# Patient Record
Sex: FEMALE | Race: WHITE | NOT HISPANIC OR LATINO | Employment: UNEMPLOYED | URBAN - METROPOLITAN AREA
[De-identification: names, ages, dates, MRNs, and addresses within clinical notes are randomized per-mention and may not be internally consistent; named-entity substitution may affect disease eponyms.]

---

## 2023-02-13 ENCOUNTER — TELEPHONE (OUTPATIENT)
Dept: OBGYN CLINIC | Facility: HOSPITAL | Age: 33
End: 2023-02-13

## 2023-02-13 ENCOUNTER — OFFICE VISIT (OUTPATIENT)
Dept: URGENT CARE | Facility: MEDICAL CENTER | Age: 33
End: 2023-02-13

## 2023-02-13 ENCOUNTER — APPOINTMENT (OUTPATIENT)
Dept: RADIOLOGY | Facility: MEDICAL CENTER | Age: 33
End: 2023-02-13

## 2023-02-13 VITALS
RESPIRATION RATE: 18 BRPM | BODY MASS INDEX: 34.55 KG/M2 | DIASTOLIC BLOOD PRESSURE: 70 MMHG | HEIGHT: 60 IN | SYSTOLIC BLOOD PRESSURE: 110 MMHG | WEIGHT: 176 LBS | TEMPERATURE: 98.6 F | OXYGEN SATURATION: 97 % | HEART RATE: 82 BPM

## 2023-02-13 DIAGNOSIS — S69.92XA HAND INJURY, LEFT, INITIAL ENCOUNTER: ICD-10-CM

## 2023-02-13 DIAGNOSIS — S62.357A CLOSED NONDISPLACED FRACTURE OF SHAFT OF FIFTH METACARPAL BONE OF LEFT HAND, INITIAL ENCOUNTER: Primary | ICD-10-CM

## 2023-02-13 NOTE — PROGRESS NOTES
[facility]        NAME: Елена Cunningham is a 27 y o  female  : 1990    MRN: [id number]  DATE: 2023  TIME: 6:03 PM    Assessment and Plan   Closed nondisplaced fracture of shaft of fifth metacarpal bone of left hand, initial encounter [[id number] [id number]]  1  Closed nondisplaced fracture of shaft of fifth metacarpal bone of left hand, initial encounter        2  Hand injury, left, initial encounter  XR hand 3+ vw left            Patient Instructions       Follow up with PCP in 3-5 days  Proceed to  ER if symptoms worsen  Chief Complaint     Chief Complaint   Patient presents with   • Hand Pain     Left hand          History of Present Illness       Patient was helping morbid obese significant other up from seated position and he went backwards and she went backwards and believes she struck her left hand on a shopping cart type object  She believes she may have wedged it in between the object and the floor  No other injury when she fell  Injury occurred last night around 8pm  Applied ice and took 600mg of ibuprofen  She reports some minimal relief with this treatment  Review of Systems   Review of Systems   Constitutional: Negative for chills and fever  HENT: Negative for ear pain and sore throat  Eyes: Negative for pain and visual disturbance  Respiratory: Negative for cough and shortness of breath  Cardiovascular: Negative for chest pain and palpitations  Gastrointestinal: Negative for abdominal pain and vomiting  Genitourinary: Negative for dysuria and hematuria  Musculoskeletal: Negative for arthralgias and back pain  Skin: Negative for color change and rash  Neurological: Negative for seizures and syncope  All other systems reviewed and are negative          Current Medications       Current Outpatient Medications:   •  QUEtiapine Fumarate (SEROQUEL PO), Take by mouth, Disp: , Rfl:     Current Allergies     Allergies as of 2023   • (No Known Allergies) The following portions of the patient's history were reviewed and updated as appropriate: allergies, current medications, past family history, past medical history, past social history, past surgical history and problem list      Past Medical History:   Diagnosis Date   • Allergic    • Anxiety        Past Surgical History:   Procedure Laterality Date   •  SECTION N/A        History reviewed  No pertinent family history  Medications have been verified  Objective   /70   Pulse 82   Temp 98 6 °F (37 °C)   Resp 18   Ht 5' (1 524 m)   Wt 79 8 kg (176 lb)   SpO2 97%   BMI 34 37 kg/m²        Physical Exam     Physical Exam  Vitals and nursing note reviewed  Constitutional:       General: She is not in acute distress  Appearance: Normal appearance  She is normal weight  She is not ill-appearing  HENT:      Head: Normocephalic and atraumatic  Right Ear: Tympanic membrane, ear canal and external ear normal       Left Ear: Tympanic membrane, ear canal and external ear normal       Nose: Nose normal       Mouth/Throat:      Mouth: Mucous membranes are moist       Pharynx: Oropharynx is clear  Eyes:      Extraocular Movements: Extraocular movements intact  Conjunctiva/sclera: Conjunctivae normal       Pupils: Pupils are equal, round, and reactive to light  Cardiovascular:      Rate and Rhythm: Normal rate and regular rhythm  Pulses: Normal pulses  Heart sounds: Normal heart sounds  Pulmonary:      Effort: Pulmonary effort is normal       Breath sounds: Normal breath sounds  Abdominal:      General: Abdomen is flat  Bowel sounds are normal       Palpations: Abdomen is soft  Musculoskeletal:      Left hand: Swelling, tenderness and bony tenderness present  Decreased strength of thumb/finger opposition  Normal strength of finger abduction and wrist extension  Normal sensation  Cervical back: Normal range of motion and neck supple        Comments: ROM limited d/t pain  Able to perform T/F opposition, discomfort increased with 5th digit  Provider Radiology Interpretation (preliminary)   Final results will be as per official Radiology Report when available:   5th metacarpal mis shaft fracture, non -displaced  Patient placed in a commercial ulnar gutter splint with follow up with ortho  Skin:     General: Skin is warm  Capillary Refill: Capillary refill takes less than 2 seconds  Neurological:      General: No focal deficit present  Mental Status: She is alert and oriented to person, place, and time     Psychiatric:         Mood and Affect: Mood normal          Behavior: Behavior normal

## 2023-02-13 NOTE — TELEPHONE ENCOUNTER
Patient is being referred to a orthopedics  Please schedule accordingly      [address]   [phone]

## 2023-02-13 NOTE — PATIENT INSTRUCTIONS
You make take Over the Counter Tylenol (Acetaminophen) and/or Motrin (Ibuprofen) as needed, as directed on packaging  Follow up with Ortho

## 2023-02-16 ENCOUNTER — OFFICE VISIT (OUTPATIENT)
Dept: OBGYN CLINIC | Facility: CLINIC | Age: 33
End: 2023-02-16

## 2023-02-16 VITALS
BODY MASS INDEX: 34.55 KG/M2 | DIASTOLIC BLOOD PRESSURE: 75 MMHG | SYSTOLIC BLOOD PRESSURE: 109 MMHG | WEIGHT: 176 LBS | HEIGHT: 60 IN | HEART RATE: 72 BPM

## 2023-02-16 DIAGNOSIS — S62.327A CLOSED DISPLACED FRACTURE OF SHAFT OF FIFTH METACARPAL BONE OF LEFT HAND, INITIAL ENCOUNTER: Primary | ICD-10-CM

## 2023-02-16 DIAGNOSIS — S62.357A CLOSED NONDISPLACED FRACTURE OF SHAFT OF FIFTH METACARPAL BONE OF LEFT HAND, INITIAL ENCOUNTER: ICD-10-CM

## 2023-02-16 NOTE — LETTER
February 16, 2023     Cristal Castañeda, [address] [address]    Patient: Marcelo Eng   YOB: 1990   Date of Visit: 2/16/2023       Dear Dr Amber Gomez: Thank you for referring Marcelo Eng to me for evaluation  Below are my notes for this consultation  If you have questions, please do not hesitate to call me  I look forward to following your patient along with you  Sincerely,        Jessy Doe MD        CC: No Recipients  Jessy Doe MD  2/16/2023  3:19 PM  Signed  ORTHOPAEDIC HAND, WRIST, AND ELBOW OFFICE  VISIT       ASSESSMENT/PLAN:      27 y o female who presents today with Left 5th metacarpal fracture    X-rays independently reviewed:  5th metacarpal shaft fracture with some shortening and mild translation  Physical exam performed  We discussd that fracture is in a tolerant position and can heal well without surgery  We discussed surgery can be performed and this will get her moving slightly faster  We discussed perioperative and postoperative care  Pt would like to be immobilized  She would like to be place in a easily removable splint if she could  We will fabricate a volar short arm for her  She can remove to bathe  Pt can alternate between the two splints  Encouraged finger movement    The patient verbalized understanding of exam findings and treatment plan  We engaged in the shared decision-making process and treatment options were discussed at length with the patient  Surgical and conservative management discussed today along with risks and benefits  Diagnoses and all orders for this visit:    Closed displaced fracture of shaft of fifth metacarpal bone of left hand, initial encounter    Other orders  -     Splint application        Follow Up:  Return in about 3 weeks (around 3/9/2023)  To Do Next Visit:  Re-evaluation of current issue      General Discussions:  Fracture - Nonoperative Care:  The physiology of a fractured bone was discussed with the patient today  With non-displaced or minimally displaced fractures, conservative treatment such as casting or splinting often results in a functional recovery  Typically, these fractures are immobilized in either a cast or splint depending on the pattern  Radiographs are typically taken at intervals throughout the fracture healing to ensure that reduction or alignment is not lost   If the fracture loses its alignment, surgical intervention may be required to stabilize it  Medical conditions such as diabetes, osteoporosis, vitamin D deficiency, and a history of or exposure to smoking may delay or prevent fracture healing  Options between cast/splint immobilization and surgical treatment were offered and the risks and benefits of both were discussed  ____________________________________________________________________________________________________________________________________________      CHIEF COMPLAINT:  Chief Complaint   Patient presents with   • Left Hand - Pain       SUBJECTIVE:  Margot Bae is a 27y o  year old  female who presents today for evaluation of Left hand pain  DOI 2023  Pt states that she was attempting to help her  up from a seated position when she lost  and fell backwards  Pt's significant other is morbidly obese  Pt states her hand hit a wheel of a personal shopping cart  They observed her over nigth and they went to the ED the next day  She was X-rayed and placed in a velcro volar splint   Has mild pain in splint  Presents with velcro ulnar gutter splint    I have personally reviewed all the relevant PMH, PSH, SH, FH, Medications and allergies      PAST MEDICAL HISTORY:  Past Medical History:   Diagnosis Date   • Allergic    • Anxiety        PAST SURGICAL HISTORY:  Past Surgical History:   Procedure Laterality Date   •  SECTION N/A        FAMILY HISTORY:  History reviewed  No pertinent family history      SOCIAL HISTORY:  Social History Tobacco Use   • Smoking status: Never   • Smokeless tobacco: Never   Vaping Use   • Vaping Use: Never used   Substance Use Topics   • Alcohol use: Not Currently   • Drug use: Never       MEDICATIONS:    Current Outpatient Medications:   •  QUEtiapine Fumarate (SEROQUEL PO), Take by mouth, Disp: , Rfl:     ALLERGIES:  No Known Allergies        REVIEW OF SYSTEMS:  Review of Systems   Constitutional: Negative for chills and fever  HENT: Negative for ear pain and sore throat  Eyes: Negative for pain and visual disturbance  Respiratory: Negative for cough and shortness of breath  Cardiovascular: Negative for chest pain and palpitations  Gastrointestinal: Negative for abdominal pain and vomiting  Genitourinary: Negative for dysuria and hematuria  Musculoskeletal: Negative for arthralgias and back pain  Skin: Negative for color change and rash  Neurological: Negative for seizures and syncope  All other systems reviewed and are negative  VITALS:  Vitals:    02/16/23 1419   BP: 109/75   Pulse: 72       LABS:  HgA1c: No results found for: HGBA1C  BMP: No results found for: GLUCOSE, CALCIUM, NA, K, CO2, CL, BUN, CREATININE    _____________________________________________________  PHYSICAL EXAMINATION:  General: well developed and well nourished, alert, oriented times 3 and appears comfortable  Psychiatric: Normal  HEENT: Normocephalic, Atraumatic Trachea Midline, No torticollis  Pulmonary: No audible wheezing or respiratory distress   Abdomen/GI: Non tender, non distended   Cardiovascular: No pitting edema, 2+ radial pulse   Skin: No masses, erythema, lacerations, fluctation, ulcerations  Neurovascular: Sensation Intact to the Median, Ulnar, Radial Nerve, Motor Intact to the Median, Ulnar, Radial Nerve and Pulses Intact  Musculoskeletal: Normal, except as noted in detailed exam and in HPI        MUSCULOSKELETAL EXAMINATION:  SILT  Able to make a full fist complex, no scissoring, some mild discomfort on fist complex, ecchymoses in palm and dorsal hand    ___________________________________________________  STUDIES REVIEWED:  I have personally reviewed AP lateral and oblique radiographs of Left hand which demonstrate     5th metacarpal fracture          PROCEDURES PERFORMED:  Splint application    Date/Time: 2/16/2023 2:51 PM  Performed by: Key Cramer  Authorized by: Jessy Doe MD   Universal Protocol:  Consent: Verbal consent obtained  Consent given by: patient  Timeout called at: 2/16/2023 2:51 PM   Patient understanding: patient states understanding of the procedure being performed      Procedure details:     Laterality:  Left    Location:  Hand    Hand:  L hand    Strapping: no      Splint type:  Volar short arm    Supplies:  Ortho-Glass and elastic bandage  Post-procedure details:     Pain:  Unchanged    Sensation:  Normal    Patient tolerance of procedure:   Tolerated well, no immediate complications          _____________________________________________________      Luigi Moreno    I,:  Key Cramer am acting as a scribe while in the presence of the attending physician :       I,:  Jessy Doe MD personally performed the services described in this documentation    as scribed in my presence :

## 2023-02-16 NOTE — PROGRESS NOTES
ORTHOPAEDIC HAND, WRIST, AND ELBOW OFFICE  VISIT       ASSESSMENT/PLAN:      27 y o female who presents today with Left 5th metacarpal fracture    X-rays independently reviewed:  5th metacarpal shaft fracture with some shortening and mild translation  Physical exam performed  We discussd that fracture is in a tolerant position and can heal well without surgery  We discussed surgery can be performed and this will get her moving slightly faster  We discussed perioperative and postoperative care  Pt would like to be immobilized  She would like to be place in a easily removable splint if she could  We will fabricate a volar short arm for her  She can remove to bathe  Pt can alternate between the two splints  Encouraged finger movement    The patient verbalized understanding of exam findings and treatment plan  We engaged in the shared decision-making process and treatment options were discussed at length with the patient  Surgical and conservative management discussed today along with risks and benefits  Diagnoses and all orders for this visit:    Closed displaced fracture of shaft of fifth metacarpal bone of left hand, initial encounter    Other orders  -     Splint application        Follow Up:  Return in about 3 weeks (around 3/9/2023)  To Do Next Visit:  Re-evaluation of current issue      General Discussions:  Fracture - Nonoperative Care: The physiology of a fractured bone was discussed with the patient today  With non-displaced or minimally displaced fractures, conservative treatment such as casting or splinting often results in a functional recovery  Typically, these fractures are immobilized in either a cast or splint depending on the pattern  Radiographs are typically taken at intervals throughout the fracture healing to ensure that reduction or alignment is not lost   If the fracture loses its alignment, surgical intervention may be required to stabilize it    Medical conditions such as diabetes, osteoporosis, vitamin D deficiency, and a history of or exposure to smoking may delay or prevent fracture healing  Options between cast/splint immobilization and surgical treatment were offered and the risks and benefits of both were discussed  ____________________________________________________________________________________________________________________________________________      CHIEF COMPLAINT:  Chief Complaint   Patient presents with   • Left Hand - Pain       SUBJECTIVE:  Gus Rodas is a 27y o  year old  female who presents today for evaluation of Left hand pain  DOI 2023  Pt states that she was attempting to help her  up from a seated position when she lost  and fell backwards  Pt's significant other is morbidly obese  Pt states her hand hit a wheel of a personal shopping cart  They observed her over nigth and they went to the ED the next day  She was X-rayed and placed in a velcro volar splint   Has mild pain in splint  Presents with velcro ulnar gutter splint    I have personally reviewed all the relevant PMH, PSH, SH, FH, Medications and allergies      PAST MEDICAL HISTORY:  Past Medical History:   Diagnosis Date   • Allergic    • Anxiety        PAST SURGICAL HISTORY:  Past Surgical History:   Procedure Laterality Date   •  SECTION N/A        FAMILY HISTORY:  History reviewed  No pertinent family history  SOCIAL HISTORY:  Social History     Tobacco Use   • Smoking status: Never   • Smokeless tobacco: Never   Vaping Use   • Vaping Use: Never used   Substance Use Topics   • Alcohol use: Not Currently   • Drug use: Never       MEDICATIONS:    Current Outpatient Medications:   •  QUEtiapine Fumarate (SEROQUEL PO), Take by mouth, Disp: , Rfl:     ALLERGIES:  No Known Allergies        REVIEW OF SYSTEMS:  Review of Systems   Constitutional: Negative for chills and fever  HENT: Negative for ear pain and sore throat      Eyes: Negative for pain and visual disturbance  Respiratory: Negative for cough and shortness of breath  Cardiovascular: Negative for chest pain and palpitations  Gastrointestinal: Negative for abdominal pain and vomiting  Genitourinary: Negative for dysuria and hematuria  Musculoskeletal: Negative for arthralgias and back pain  Skin: Negative for color change and rash  Neurological: Negative for seizures and syncope  All other systems reviewed and are negative  VITALS:  Vitals:    02/16/23 1419   BP: 109/75   Pulse: 72       LABS:  HgA1c: No results found for: HGBA1C  BMP: No results found for: GLUCOSE, CALCIUM, NA, K, CO2, CL, BUN, CREATININE    _____________________________________________________  PHYSICAL EXAMINATION:  General: well developed and well nourished, alert, oriented times 3 and appears comfortable  Psychiatric: Normal  HEENT: Normocephalic, Atraumatic Trachea Midline, No torticollis  Pulmonary: No audible wheezing or respiratory distress   Abdomen/GI: Non tender, non distended   Cardiovascular: No pitting edema, 2+ radial pulse   Skin: No masses, erythema, lacerations, fluctation, ulcerations  Neurovascular: Sensation Intact to the Median, Ulnar, Radial Nerve, Motor Intact to the Median, Ulnar, Radial Nerve and Pulses Intact  Musculoskeletal: Normal, except as noted in detailed exam and in HPI  MUSCULOSKELETAL EXAMINATION:  SILT  Able to make a full fist complex, no scissoring, some mild discomfort on fist complex, ecchymoses in palm and dorsal hand    ___________________________________________________  STUDIES REVIEWED:  I have personally reviewed AP lateral and oblique radiographs of Left hand which demonstrate     5th metacarpal fracture          PROCEDURES PERFORMED:  Splint application    Date/Time: 2/16/2023 2:51 PM  Performed by: Maru Teran  Authorized by: Lynne Multani MD   Universal Protocol:  Consent: Verbal consent obtained    Consent given by: patient  Timeout called at: 2/16/2023 2:51 PM   Patient understanding: patient states understanding of the procedure being performed      Procedure details:     Laterality:  Left    Location:  Hand    Hand:  L hand    Strapping: no      Splint type:  Volar short arm    Supplies:  Ortho-Glass and elastic bandage  Post-procedure details:     Pain:  Unchanged    Sensation:  Normal    Patient tolerance of procedure:   Tolerated well, no immediate complications          _____________________________________________________      Leia Mccracken    I,:  Jackie Blake am acting as a scribe while in the presence of the attending physician :       I,:  Katrina Moritz, MD personally performed the services described in this documentation    as scribed in my presence :

## 2023-03-09 ENCOUNTER — TELEPHONE (OUTPATIENT)
Dept: OBGYN CLINIC | Facility: HOSPITAL | Age: 33
End: 2023-03-09

## 2023-03-09 ENCOUNTER — OFFICE VISIT (OUTPATIENT)
Dept: OBGYN CLINIC | Facility: CLINIC | Age: 33
End: 2023-03-09

## 2023-03-09 VITALS
HEART RATE: 82 BPM | DIASTOLIC BLOOD PRESSURE: 78 MMHG | BODY MASS INDEX: 34.55 KG/M2 | HEIGHT: 60 IN | SYSTOLIC BLOOD PRESSURE: 118 MMHG | WEIGHT: 176 LBS

## 2023-03-09 DIAGNOSIS — S62.327A CLOSED DISPLACED FRACTURE OF SHAFT OF FIFTH METACARPAL BONE OF LEFT HAND, INITIAL ENCOUNTER: Primary | ICD-10-CM

## 2023-03-09 NOTE — TELEPHONE ENCOUNTER
Caller: Patient     Doctor: Enrrique Huang    Reason for call: Calling from outside office requesting they put the info for the same  that dropped them off into the system to pick them up  Spoke to office and they are unable to do it this way since it has to be requested through [address] [place] [address] services and that  was not available       They advised patient can come back in to set up new lyft if needed     Call back#: n/a

## 2023-03-09 NOTE — PROGRESS NOTES
ASSESSMENT/PLAN:      27 y o female with left 5th metacarpal fracture, DOI 2/13/23  Discussed with patient to continue splinting at night, to leave off during the day  Patient to begin gentle ROM exercises, making sure she continues moving fingers so she doesn't get too stiff  New volar short arm splint was fabricated and placed on patient today  Patient will return in 3 weeks for re-evaluation and repeat xrays  NSAIDs if discomfort arises  The patient verbalized understanding of exam findings and treatment plan  We engaged in the shared decision-making process and treatment options were discussed at length with the patient  Surgical and conservative management discussed today along with risks and benefits  Diagnoses and all orders for this visit:    Closed displaced fracture of shaft of fifth metacarpal bone of left hand, initial encounter    Other orders  -     Splint application      Follow Up:  Return in about 3 weeks (around 3/30/2023) for Recheck and xrays  To Do Next Visit:  Re-evaluation of current issue and X-rays of the  left  hand    ____________________________________________________________________________________________________________________________________________      CHIEF COMPLAINT:  Chief Complaint   Patient presents with   • Left Hand - Fracture, Follow-up       SUBJECTIVE:  Arthur Ruiz is a 27y o  year old female who presents for follow up of left 5th metacarpal fracture, DOI 2/13/23  Patient has been complaint with wearing volar short arm splint and only removing to bathe  She reports no pain  She states she had 1 episode of numbness and tingling at night but believes it was due to position of her arm  I have personally reviewed all the relevant PMH, PSH, SH, FH, Medications and allergies       PAST MEDICAL HISTORY:  Past Medical History:   Diagnosis Date   • Allergic    • Anxiety        PAST SURGICAL HISTORY:  Past Surgical History:   Procedure Laterality Date   •  SECTION N/A        FAMILY HISTORY:  History reviewed  No pertinent family history  SOCIAL HISTORY:  Social History     Tobacco Use   • Smoking status: Never   • Smokeless tobacco: Never   Vaping Use   • Vaping Use: Never used   Substance Use Topics   • Alcohol use: Not Currently   • Drug use: Never       MEDICATIONS:    Current Outpatient Medications:   •  QUEtiapine Fumarate (SEROQUEL PO), Take by mouth, Disp: , Rfl:     ALLERGIES:  No Known Allergies    REVIEW OF SYSTEMS:  Review of Systems   Constitutional: Negative for chills and fever  HENT: Negative for ear pain and sore throat  Eyes: Negative for pain and visual disturbance  Respiratory: Negative for cough and shortness of breath  Cardiovascular: Negative for chest pain and palpitations  Gastrointestinal: Negative for abdominal pain and vomiting  Genitourinary: Negative for dysuria and hematuria  Musculoskeletal: Negative for arthralgias and back pain  Skin: Negative for color change and rash  Neurological: Negative for seizures and syncope  All other systems reviewed and are negative        VITALS:  Vitals:    23 1447   BP: 118/78   Pulse: 82       LABS:  HgA1c: No results found for: HGBA1C  BMP: No results found for: GLUCOSE, CALCIUM, NA, K, CO2, CL, BUN, CREATININE    _____________________________________________________  PHYSICAL EXAMINATION:  General: well developed and well nourished, alert, oriented times 3 and appears comfortable  Psychiatric: Normal  HEENT: Normocephalic, Atraumatic Trachea Midline, No torticollis  Pulmonary: No audible wheezing or respiratory distress   Cardiovascular: No pitting edema, 2+ radial pulse   Abdominal/GI: abdomen non tender, non distended   Skin: No masses, erythema, lacerations, fluctation, ulcerations  Neurovascular: Sensation Intact to the Median, Ulnar, Radial Nerve, Motor Intact to the Median, Ulnar, Radial Nerve and Pulses Intact  Musculoskeletal: Normal, except as noted in detailed exam and in HPI  MUSCULOSKELETAL EXAMINATION:  Full composite fist  Full extension  SILT  No erythema, ecchymosis, or edema  No tenderness of fracture site      ___________________________________________________  STUDIES REVIEWED:  No new imaging to review      PROCEDURES PERFORMED:  Splint application    Date/Time: 3/9/2023 3:15 PM  Performed by: Bg Greene  Authorized by: Ashley Vo MD   Universal Protocol:  Consent: Verbal consent obtained  Written consent not obtained  Risks and benefits: risks, benefits and alternatives were discussed  Consent given by: patient  Time out: Immediately prior to procedure a "time out" was called to verify the correct patient, procedure, equipment, support staff and site/side marked as required  Timeout called at: 3/9/2023 3:15 PM   Patient understanding: patient states understanding of the procedure being performed  Patient identity confirmed: verbally with patient      Pre-procedure details:     Sensation:  Normal  Procedure details:     Laterality:  Left    Location:  Hand    Hand:  L hand    Splint type:  Volar short arm    Supplies:  Elastic bandage and Ortho-Glass  Post-procedure details:     Pain:  Improved    Sensation:  Normal    Patient tolerance of procedure:   Tolerated well, no immediate complications           _____________________________________________________      [facility]    I,:  Bg Greene am acting as a scribe while in the presence of the attending physician :       I,:  Ashley Vo MD personally performed the services described in this documentation    as scribed in my presence :

## 2023-03-09 NOTE — TELEPHONE ENCOUNTER
Caller: Patient significant other     Doctor: Georgi Nagy     Reason for call: He called today asking for a ride for today's appointment   Email sent to practice admin     Call back#: [phone]

## 2023-04-11 PROBLEM — S62.327A CLOSED DISPLACED FRACTURE OF SHAFT OF FIFTH METACARPAL BONE OF LEFT HAND: Status: ACTIVE | Noted: 2023-04-11

## 2023-04-25 ENCOUNTER — OFFICE VISIT (OUTPATIENT)
Dept: FAMILY MEDICINE CLINIC | Facility: CLINIC | Age: 33
End: 2023-04-25

## 2023-04-25 VITALS
HEART RATE: 83 BPM | TEMPERATURE: 97.4 F | BODY MASS INDEX: 34.75 KG/M2 | HEIGHT: 60 IN | WEIGHT: 177 LBS | RESPIRATION RATE: 18 BRPM | SYSTOLIC BLOOD PRESSURE: 110 MMHG | DIASTOLIC BLOOD PRESSURE: 76 MMHG | OXYGEN SATURATION: 97 %

## 2023-04-25 DIAGNOSIS — Z13.21 ENCOUNTER FOR VITAMIN DEFICIENCY SCREENING: ICD-10-CM

## 2023-04-25 DIAGNOSIS — Z76.89 ENCOUNTER TO ESTABLISH CARE: Primary | ICD-10-CM

## 2023-04-25 DIAGNOSIS — F41.9 ANXIETY: ICD-10-CM

## 2023-04-25 DIAGNOSIS — Z12.4 CERVICAL CANCER SCREENING: ICD-10-CM

## 2023-04-25 RX ORDER — MONTELUKAST SODIUM 10 MG/1
10 TABLET ORAL
COMMUNITY

## 2023-04-25 NOTE — PROGRESS NOTES
Assessment/Plan:       Problem List Items Addressed This Visit    None  Visit Diagnoses     Encounter to establish care    -  Primary    Relevant Orders    : HIV 1/2 AB/AG w Reflex [facility] for 2 yr old and above    Hepatitis C antibody    CBC and differential    Comprehensive metabolic panel    HEMOGLOBIN A1C W/ EAG ESTIMATION    TSH, 3rd generation with Free T4 reflex    Lipid Panel with Direct LDL reflex    BMI 34 0-34 9,adult        Cervical cancer screening        Relevant Orders    Ambulatory Referral to Gynecology    Encounter for vitamin deficiency screening        Relevant Orders    Vitamin D 25 hydroxy    Anxiety        Relevant Orders    Ambulatory Referral to Psychiatry    Ambulatory Referral to Behavioral Health          Hemodynamically stable, clinically well-appearing and no acute distress  Patient will be given some blood work today  Additionally patient will be given a  prescription for psychiatry and behavioral health secondary to symptoms of anxiety and stress levels     Continue to take Seroquel and follow-up with me about dosage  Has not been compliant with her Seroquel  Patient is to follow-up with next 4 to 6 weeks  Subjective:      Patient ID: Steve Quiroz is a 27 y o  female past medical history of asthma presents to the clinic to establish care  Patient has moved from [place]  Medication patient takes includes Seroquel but she is unsure of the medication dose, Singulair and Flonase as needed  Patient denies chest pain, shortness of breath, fevers, chills, night sweats  Patient denies any recreational drugs, alcohol use or tobacco use  She is not up-to-date with cervical cancer screening  Patient is unsure of FDLMP  Patient says that elevated level of stress and anxiety secondary to issues if not finding a job  Patient denies homicidal or suicidal ideations  Patient takes Seroquel but not completely compliant with medication      HPI    The following portions of the patient's history were reviewed and updated as appropriate: allergies, current medications, past family history, past medical history, past social history, past surgical history and problem list     Review of Systems   Constitutional: Negative for chills and fever  HENT: Negative for ear pain and sore throat  Eyes: Negative for pain and visual disturbance  Respiratory: Negative for cough and shortness of breath  Cardiovascular: Negative for chest pain and palpitations  Gastrointestinal: Negative for abdominal pain and vomiting  Genitourinary: Negative for dysuria and hematuria  Musculoskeletal: Negative for arthralgias and back pain  Skin: Negative for color change and rash  Neurological: Negative for seizures and syncope  Psychiatric/Behavioral: The patient is nervous/anxious  All other systems reviewed and are negative  Objective:    /76   Pulse 83   Temp (!) 97 4 °F (36 3 °C)   Resp 18   Ht 5' (1 524 m)   Wt 80 3 kg (177 lb)   SpO2 97%   BMI 34 57 kg/m²        Physical Exam  Constitutional:       Appearance: Normal appearance  She is normal weight  HENT:      Head: Normocephalic and atraumatic  Right Ear: Tympanic membrane normal       Left Ear: Tympanic membrane normal    Cardiovascular:      Rate and Rhythm: Normal rate and regular rhythm  Pulses: Normal pulses  Heart sounds: Normal heart sounds  Pulmonary:      Effort: Pulmonary effort is normal       Breath sounds: Normal breath sounds  Abdominal:      General: Abdomen is flat  Bowel sounds are normal  There is no distension  Palpations: Abdomen is soft  There is no mass  Tenderness: There is no abdominal tenderness  Hernia: No hernia is present  Musculoskeletal:         General: No swelling or tenderness  Normal range of motion  Cervical back: Normal range of motion  Skin:     General: Skin is warm  Capillary Refill: Capillary refill takes less than 2 seconds  Neurological:      General: No focal deficit present  Mental Status: She is alert and oriented to person, place, and time  Cranial Nerves: No cranial nerve deficit  Sensory: No sensory deficit  Motor: No weakness        Coordination: Coordination normal    Psychiatric:         Mood and Affect: Mood normal          Behavior: Behavior normal

## 2023-04-27 ENCOUNTER — TELEPHONE (OUTPATIENT)
Dept: PSYCHIATRY | Facility: CLINIC | Age: 33
End: 2023-04-27

## 2023-04-27 NOTE — TELEPHONE ENCOUNTER
Called pt regarding referral a gentleman answered advised to have pt call us at there convenience as patient was asleep at the moment

## 2023-05-02 NOTE — PROGRESS NOTES
[facility]  [address] [facility]    ASSESSMENT/PLAN: Jose A Barraza is a 27 y o  No obstetric history on file  who presents for annual gynecologic exam     Encounter for routine gynecologic examination  - Routine well woman exam completed today  - Cervical Cancer Screening: Current ASCCP Guidelines reviewed  Last Pap: unknwon  Next Pap Due: today  - HPV Vaccination status: unknown  - STI screening offered including HIV testing: not indicated based on hx or requested at time of visit  - Contraceptive counseling discussed  Current contraception: 2/2018, IUD- ? Type, threads not seen at time of visit  - to use condoms until placement of IUD verified    - RTO 1 year and follow-up IUD     Additional problems addressed during this visit:  1  Encounter for gynecological examination without abnormal finding  -     Liquid-based pap, screening  -     HPV High Risk    2  Cervical cancer screening  -     Ambulatory Referral to Gynecology  -     Liquid-based pap, screening  -     HPV High Risk    3  Intrauterine contraceptive device threads lost, initial encounter  -     US pelvis complete non OB; Future    - will follow-up ultrasound when available  CC:  Annual Gynecologic Examination    HPI: Jose A Barraza is a 27 y o  No obstetric history on file  who presents for annual gynecologic examination  Chang Raid presents for gyn exam today  N/A LMP  No menses since insertion of IUD  Using IUD as birth control method  Does not like how IUD makes her feel  Last pap smear unknown  History of abnormal pap smear- no  Sexually active- yes, with partner since  2016  HPV vaccine - unknown  Does not desire STI testing  Interrupted sleep per day  1-2 servings of calcium rich food per day  Walking daily  Decreasing servings of caffeine per day  Does not perform SBE monthly  Safe at home - yes  Wears seat belts  Concerns :  Unsure of IUD type  Wants to lose 30 lbs   Feels that she would like to try a different birth control pill  Will wait until seen by psychiatry to change birth control         The following portions of the patient's history were reviewed and updated as appropriate: allergies, current medications, past family history, past medical history, obstetric history, gynecologic history, past social history, past surgical history and problem list     Review of Systems   Constitutional: Negative for chills, fatigue and fever  Respiratory: Negative for cough and shortness of breath  Cardiovascular: Negative for chest pain, palpitations and leg swelling  Gastrointestinal: Positive for diarrhea  Negative for constipation  Genitourinary: Negative for difficulty urinating, dysuria, frequency, menstrual problem, pelvic pain, urgency, vaginal bleeding, vaginal discharge and vaginal pain  Neurological: Negative for light-headedness and headaches  Psychiatric/Behavioral: The patient is nervous/anxious  Objective:  /64   Ht 5' (1 524 m)   Wt 80 7 kg (178 lb)   LMP  (LMP Unknown)   BMI 34 76 kg/m²    Physical Exam  Vitals reviewed  Constitutional:       Appearance: Normal appearance  HENT:      Head: Normocephalic  Neck:      Thyroid: No thyroid mass or thyroid tenderness  Cardiovascular:      Rate and Rhythm: Normal rate and regular rhythm  Heart sounds: Normal heart sounds  Pulmonary:      Effort: Pulmonary effort is normal       Breath sounds: Normal breath sounds  Chest:   Breasts:     Right: No mass, nipple discharge, skin change or tenderness  Left: No mass, nipple discharge, skin change or tenderness  Abdominal:      General: There is no distension  Palpations: There is no mass  Tenderness: There is no abdominal tenderness  There is no guarding  Genitourinary:     General: Normal vulva  Exam position: Lithotomy position  Labia:         Right: No tenderness or lesion  Left: No tenderness or lesion         Vagina: No vaginal discharge, tenderness, bleeding or lesions  Cervix: No discharge, lesion, erythema or cervical bleeding  Uterus: Normal  Not enlarged and not tender  Adnexa:         Right: No mass, tenderness or fullness  Left: No mass, tenderness or fullness  Comments: No IUD string visualized  Musculoskeletal:      Cervical back: Normal range of motion  Lymphadenopathy:      Upper Body:      Right upper body: No axillary adenopathy  Left upper body: No axillary adenopathy  Skin:     General: Skin is warm and dry  Neurological:      Mental Status: She is alert     Psychiatric:         Mood and Affect: Mood normal          Behavior: Behavior normal          Judgment: Judgment normal

## 2023-05-03 ENCOUNTER — OFFICE VISIT (OUTPATIENT)
Dept: OBGYN CLINIC | Facility: CLINIC | Age: 33
End: 2023-05-03

## 2023-05-03 VITALS
HEIGHT: 60 IN | SYSTOLIC BLOOD PRESSURE: 114 MMHG | WEIGHT: 178 LBS | BODY MASS INDEX: 34.95 KG/M2 | DIASTOLIC BLOOD PRESSURE: 64 MMHG

## 2023-05-03 DIAGNOSIS — Z12.4 CERVICAL CANCER SCREENING: ICD-10-CM

## 2023-05-03 DIAGNOSIS — Z01.419 ENCOUNTER FOR GYNECOLOGICAL EXAMINATION WITHOUT ABNORMAL FINDING: Primary | ICD-10-CM

## 2023-05-03 DIAGNOSIS — T83.32XA INTRAUTERINE CONTRACEPTIVE DEVICE THREADS LOST, INITIAL ENCOUNTER: ICD-10-CM

## 2023-05-05 LAB
HPV HR 12 DNA CVX QL NAA+PROBE: NEGATIVE
HPV16 DNA CVX QL NAA+PROBE: NEGATIVE
HPV18 DNA CVX QL NAA+PROBE: NEGATIVE

## 2023-05-09 LAB
LAB AP GYN PRIMARY INTERPRETATION: NORMAL
Lab: NORMAL

## 2023-05-17 ENCOUNTER — TELEPHONE (OUTPATIENT)
Dept: PSYCHIATRY | Facility: CLINIC | Age: 33
End: 2023-05-17

## 2023-05-17 NOTE — TELEPHONE ENCOUNTER
According to patient's chart notes, patient is already on wait list  Referral has been closed at this time

## 2023-08-29 ENCOUNTER — OFFICE VISIT (OUTPATIENT)
Dept: FAMILY MEDICINE CLINIC | Facility: CLINIC | Age: 33
End: 2023-08-29
Payer: COMMERCIAL

## 2023-08-29 VITALS
WEIGHT: 179.8 LBS | RESPIRATION RATE: 18 BRPM | SYSTOLIC BLOOD PRESSURE: 120 MMHG | BODY MASS INDEX: 35.3 KG/M2 | OXYGEN SATURATION: 99 % | HEIGHT: 60 IN | HEART RATE: 83 BPM | TEMPERATURE: 98.2 F | DIASTOLIC BLOOD PRESSURE: 80 MMHG

## 2023-08-29 DIAGNOSIS — Z13.21 ENCOUNTER FOR VITAMIN DEFICIENCY SCREENING: ICD-10-CM

## 2023-08-29 DIAGNOSIS — Z76.89 ENCOUNTER TO ESTABLISH CARE: ICD-10-CM

## 2023-08-29 DIAGNOSIS — Z00.00 ANNUAL PHYSICAL EXAM: Primary | ICD-10-CM

## 2023-08-29 DIAGNOSIS — F39 MOOD DISORDER (HCC): ICD-10-CM

## 2023-08-29 DIAGNOSIS — J30.9 ALLERGIC RHINITIS, UNSPECIFIED SEASONALITY, UNSPECIFIED TRIGGER: ICD-10-CM

## 2023-08-29 DIAGNOSIS — F41.9 ANXIETY: ICD-10-CM

## 2023-08-29 DIAGNOSIS — F51.04 PSYCHOPHYSIOLOGICAL INSOMNIA: ICD-10-CM

## 2023-08-29 DIAGNOSIS — Z91.89 NEED FOR DENTAL CARE: ICD-10-CM

## 2023-08-29 LAB
25(OH)D3 SERPL-MCNC: 17.3 NG/ML (ref 30–100)
ALBUMIN SERPL BCP-MCNC: 4.4 G/DL (ref 3.5–5)
ALP SERPL-CCNC: 53 U/L (ref 34–104)
ALT SERPL W P-5'-P-CCNC: 20 U/L (ref 7–52)
ANION GAP SERPL CALCULATED.3IONS-SCNC: 7 MMOL/L
AST SERPL W P-5'-P-CCNC: 21 U/L (ref 13–39)
BASOPHILS # BLD AUTO: 0.1 THOUSANDS/ÂΜL (ref 0–0.1)
BASOPHILS NFR BLD AUTO: 1 % (ref 0–1)
BILIRUB SERPL-MCNC: 0.52 MG/DL (ref 0.2–1)
BUN SERPL-MCNC: 13 MG/DL (ref 5–25)
CALCIUM SERPL-MCNC: 9.2 MG/DL (ref 8.4–10.2)
CHLORIDE SERPL-SCNC: 103 MMOL/L (ref 96–108)
CHOLEST SERPL-MCNC: 165 MG/DL
CO2 SERPL-SCNC: 25 MMOL/L (ref 21–32)
CREAT SERPL-MCNC: 0.71 MG/DL (ref 0.6–1.3)
EOSINOPHIL # BLD AUTO: 0.33 THOUSAND/ÂΜL (ref 0–0.61)
EOSINOPHIL NFR BLD AUTO: 4 % (ref 0–6)
ERYTHROCYTE [DISTWIDTH] IN BLOOD BY AUTOMATED COUNT: 11.9 % (ref 11.6–15.1)
EST. AVERAGE GLUCOSE BLD GHB EST-MCNC: 100 MG/DL
GFR SERPL CREATININE-BSD FRML MDRD: 111 ML/MIN/1.73SQ M
GLUCOSE P FAST SERPL-MCNC: 85 MG/DL (ref 65–99)
HBA1C MFR BLD: 5.1 %
HCT VFR BLD AUTO: 46 % (ref 34.8–46.1)
HDLC SERPL-MCNC: 52 MG/DL
HGB BLD-MCNC: 16 G/DL (ref 11.5–15.4)
IMM GRANULOCYTES # BLD AUTO: 0.03 THOUSAND/UL (ref 0–0.2)
IMM GRANULOCYTES NFR BLD AUTO: 0 % (ref 0–2)
LDLC SERPL CALC-MCNC: 96 MG/DL (ref 0–100)
LYMPHOCYTES # BLD AUTO: 2.51 THOUSANDS/ÂΜL (ref 0.6–4.47)
LYMPHOCYTES NFR BLD AUTO: 33 % (ref 14–44)
MCH RBC QN AUTO: 32.1 PG (ref 26.8–34.3)
MCHC RBC AUTO-ENTMCNC: 34.8 G/DL (ref 31.4–37.4)
MCV RBC AUTO: 92 FL (ref 82–98)
MONOCYTES # BLD AUTO: 0.51 THOUSAND/ÂΜL (ref 0.17–1.22)
MONOCYTES NFR BLD AUTO: 7 % (ref 4–12)
NEUTROPHILS # BLD AUTO: 4.24 THOUSANDS/ÂΜL (ref 1.85–7.62)
NEUTS SEG NFR BLD AUTO: 55 % (ref 43–75)
NRBC BLD AUTO-RTO: 0 /100 WBCS
PLATELET # BLD AUTO: 260 THOUSANDS/UL (ref 149–390)
PMV BLD AUTO: 9.4 FL (ref 8.9–12.7)
POTASSIUM SERPL-SCNC: 4.3 MMOL/L (ref 3.5–5.3)
PROT SERPL-MCNC: 7.2 G/DL (ref 6.4–8.4)
RBC # BLD AUTO: 4.98 MILLION/UL (ref 3.81–5.12)
SODIUM SERPL-SCNC: 135 MMOL/L (ref 135–147)
TRIGL SERPL-MCNC: 84 MG/DL
TSH SERPL DL<=0.05 MIU/L-ACNC: 2.84 UIU/ML (ref 0.45–4.5)
WBC # BLD AUTO: 7.72 THOUSAND/UL (ref 4.31–10.16)

## 2023-08-29 PROCEDURE — 83036 HEMOGLOBIN GLYCOSYLATED A1C: CPT | Performed by: FAMILY MEDICINE

## 2023-08-29 PROCEDURE — 87389 HIV-1 AG W/HIV-1&-2 AB AG IA: CPT | Performed by: FAMILY MEDICINE

## 2023-08-29 PROCEDURE — 80061 LIPID PANEL: CPT | Performed by: FAMILY MEDICINE

## 2023-08-29 PROCEDURE — T1015 CLINIC SERVICE: HCPCS | Performed by: NURSE PRACTITIONER

## 2023-08-29 PROCEDURE — 82306 VITAMIN D 25 HYDROXY: CPT | Performed by: FAMILY MEDICINE

## 2023-08-29 PROCEDURE — 84443 ASSAY THYROID STIM HORMONE: CPT | Performed by: FAMILY MEDICINE

## 2023-08-29 PROCEDURE — 85025 COMPLETE CBC W/AUTO DIFF WBC: CPT | Performed by: FAMILY MEDICINE

## 2023-08-29 PROCEDURE — 99395 PREV VISIT EST AGE 18-39: CPT | Performed by: NURSE PRACTITIONER

## 2023-08-29 PROCEDURE — 86803 HEPATITIS C AB TEST: CPT | Performed by: FAMILY MEDICINE

## 2023-08-29 PROCEDURE — 80053 COMPREHEN METABOLIC PANEL: CPT | Performed by: FAMILY MEDICINE

## 2023-08-29 RX ORDER — QUETIAPINE FUMARATE 50 MG/1
50 TABLET, FILM COATED ORAL
Qty: 30 TABLET | Refills: 0 | Status: SHIPPED | OUTPATIENT
Start: 2023-08-29

## 2023-08-29 RX ORDER — LORATADINE 10 MG/1
10 TABLET ORAL DAILY
Qty: 90 TABLET | Refills: 1 | Status: SHIPPED | OUTPATIENT
Start: 2023-08-29

## 2023-08-29 RX ORDER — FLUTICASONE PROPIONATE 50 MCG
2 SPRAY, SUSPENSION (ML) NASAL DAILY
Qty: 16 G | Refills: 2 | Status: SHIPPED | OUTPATIENT
Start: 2023-08-29 | End: 2023-08-29

## 2023-08-29 NOTE — PROGRESS NOTES
[address]    NAME: Frances Johnson  AGE: 27 y.o. SEX: female  : 1990     DATE: 2023     Assessment and Plan:     Problem List Items Addressed This Visit    None  Visit Diagnoses     Annual physical exam    -  Primary    Mood disorder ([address])        Increase Seroquel to 50 mg hs   referral provided  RTC 1 month    Relevant Medications    QUEtiapine (SEROquel) 50 mg tablet    Other Relevant Orders    Ambulatory Referral to [address] [place] [address]        Stop montelukast  Increase Seroquel to 50 mg hs   referral provided  RTC 1 month    Relevant Medications    QUEtiapine (SEROquel) 50 mg tablet    Other Relevant Orders    Ambulatory Referral to Behavioral Health    Psychophysiological insomnia        Relevant Medications    QUEtiapine (SEROquel) 50 mg tablet    Other Relevant Orders    Ambulatory Referral to [address] [place] [address]    Allergic rhinitis, unspecified seasonality, unspecified trigger        Stop montelukast due to ineffectiveness  Start loratidine and veramyst daily  RTC 1 month    Relevant Medications    loratadine (CLARITIN) 10 mg tablet    fluticasone (VERAMYST) 27.5 MCG/SPRAY nasal spray    Need for dental care        Relevant Orders    Ambulatory referral to Dentistry          Immunizations and preventive care screenings were discussed with patient today. Appropriate education was printed on patient's after visit summary. Counseling:  Alcohol/drug use: discussed moderation in alcohol intake, the recommendations for healthy alcohol use, and avoidance of illicit drug use. Dental Health: discussed importance of regular tooth brushing, flossing, and dental visits. Injury prevention: discussed safety/seat belts, safety helmets, smoke detectors, carbon dioxide detectors, and smoking near bedding or upholstery.   Sexual health: discussed sexually transmitted diseases, partner selection, use of condoms, avoidance of unintended pregnancy, and contraceptive alternatives. · Exercise: the importance of regular exercise/physical activity was discussed. Recommend exercise 3-5 times per week for at least 30 minutes. BMI Counseling: Body mass index is 35.11 kg/m². The BMI is above normal. Nutrition recommendations include decreasing portion sizes, encouraging healthy choices of fruits and vegetables, decreasing fast food intake, consuming healthier snacks, limiting drinks that contain sugar, moderation in carbohydrate intake, increasing intake of lean protein, reducing intake of saturated and trans fat and reducing intake of cholesterol. Exercise recommendations include exercising 3-5 times per week. No pharmacotherapy was ordered. Rationale for BMI follow-up plan is due to patient being overweight or obese. Depression Screening and Follow-up Plan: Patient was screened for depression during today's encounter. They screened negative with a PHQ-2 score of 2. Return in about 4 weeks (around 9/26/2023) for Recheck anxiety, mood disorder, and labs. Chief Complaint:     Chief Complaint   Patient presents with   • Follow-up      History of Present Illness:     Adult Annual Physical   Patient here for a comprehensive physical exam. The patient reports problems - anxiety; difficulty sleeping. Diet and Physical Activity  · Diet/Nutrition: frequent junk food and limited fruits/vegetables. · Exercise: walking, 3-4 times a week on average and less than 30 minutes on average. Depression Screening  PHQ-2/9 Depression Screening    Little interest or pleasure in doing things: 1 - several days  Feeling down, depressed, or hopeless: 1 - several days  PHQ-2 Score: 2  PHQ-2 Interpretation: Negative depression screen       General Health  · Sleep: sleeps poorly and gets 4-6 hours of sleep on average. · Hearing: normal - bilateral.  · Vision: no vision problems and most recent eye exam >1 year ago. · Dental: brushes teeth twice daily and does not floss. /GYN Health  · Last menstrual period: 2016  · Contraceptive method: IUD placement. · History of STDs?: no.     Review of Systems:     Review of Systems   Constitutional: Positive for fatigue. Negative for activity change and appetite change. HENT: Positive for congestion and rhinorrhea. Negative for dental problem, sore throat and trouble swallowing. Respiratory: Negative for cough and shortness of breath. Cardiovascular: Negative for chest pain and leg swelling. Gastrointestinal: Negative for abdominal pain, constipation and nausea. Genitourinary: Negative for difficulty urinating, dysuria and hematuria. Musculoskeletal: Negative for arthralgias and myalgias. Skin: Negative for rash. Neurological: Negative for dizziness, weakness and headaches. Psychiatric/Behavioral: Positive for sleep disturbance. Negative for agitation, dysphoric mood, self-injury and suicidal ideas. The patient is nervous/anxious. Past Medical History:     Past Medical History:   Diagnosis Date   • Allergic    • Anxiety       Past Surgical History:     Past Surgical History:   Procedure Laterality Date   •  SECTION N/A       Social History:     Social History     Socioeconomic History   • Marital status: Single     Spouse name: None   • Number of children: None   • Years of education: None   • Highest education level: None   Occupational History   • None   Tobacco Use   • Smoking status: Never   • Smokeless tobacco: Never   Vaping Use   • Vaping Use: Never used   Substance and Sexual Activity   • Alcohol use: Not Currently   • Drug use: Never   • Sexual activity: Yes     Partners: Male     Birth control/protection: I.U.D.    Other Topics Concern   • None   Social History Narrative   • None     Social Determinants of Health     Financial Resource Strain: Not on file   Food Insecurity: Not on file   Transportation Needs: Not on file   Physical Activity: Not on file   Stress: Not on file   Social Connections: Not on file   Intimate Partner Violence: Not on file   Housing Stability: Not on file      Family History:     Family History   Adopted: Yes   Family history unknown: Yes      Current Medications:     Current Outpatient Medications   Medication Sig Dispense Refill   • fluticasone (VERAMYST) 27.5 MCG/SPRAY nasal spray 2 sprays into each nostril daily 9.1 mL 2   • loratadine (CLARITIN) 10 mg tablet Take 1 tablet (10 mg total) by mouth daily 90 tablet 1   • QUEtiapine (SEROquel) 50 mg tablet Take 1 tablet (50 mg total) by mouth daily at bedtime 30 tablet 0     No current facility-administered medications for this visit. Allergies: Allergies   Allergen Reactions   • Xanax [Alprazolam] Other (See Comments)     Patient reports history of addiction to xanax. Want to ensure she is not prescribed this medication again. Physical Exam:     /80 (BP Location: Left arm, Patient Position: Sitting, Cuff Size: Standard)   Pulse 83   Temp 98.2 °F (36.8 °C)   Resp 18   Ht 5' (1.524 m)   Wt 81.6 kg (179 lb 12.8 oz)   SpO2 99%   BMI 35.11 kg/m²     Physical Exam  Vitals and nursing note reviewed. Constitutional:       General: She is not in acute distress. Appearance: She is well-developed. She is obese. HENT:      Head: Normocephalic and atraumatic. Nose: Nose normal.      Mouth/Throat:      Mouth: Mucous membranes are moist.      Pharynx: Oropharynx is clear. Eyes:      Conjunctiva/sclera: Conjunctivae normal.   Cardiovascular:      Rate and Rhythm: Normal rate and regular rhythm. Heart sounds: No murmur heard. Pulmonary:      Effort: Pulmonary effort is normal. No respiratory distress. Breath sounds: Normal breath sounds. Abdominal:      Palpations: Abdomen is soft. Tenderness: There is no abdominal tenderness. Genitourinary:     Comments: Exam deferred  Musculoskeletal:         General: No swelling. Skin:     General: Skin is warm and dry. Capillary Refill: Capillary refill takes less than 2 seconds. Neurological:      Mental Status: She is alert and oriented to person, place, and time.    Psychiatric:         Mood and Affect: Mood normal.          Brennen Shantanu, [address]

## 2023-08-30 LAB
HCV AB SER QL: NORMAL
HIV 1+2 AB+HIV1 P24 AG SERPL QL IA: NORMAL
HIV 2 AB SERPL QL IA: NORMAL
HIV1 AB SERPL QL IA: NORMAL
HIV1 P24 AG SERPL QL IA: NORMAL

## 2023-09-13 ENCOUNTER — TELEPHONE (OUTPATIENT)
Dept: PSYCHIATRY | Facility: CLINIC | Age: 33
End: 2023-09-13

## 2023-09-19 ENCOUNTER — TELEPHONE (OUTPATIENT)
Dept: PSYCHIATRY | Facility: CLINIC | Age: 33
End: 2023-09-19

## 2023-09-19 NOTE — TELEPHONE ENCOUNTER
Contacted patient off of Medication Management  to verify needs of services in attempts to offer patient an appointment at [facility] . spoke with patient whom stated they are still interested in services. Patient did state they required lyft services for appointment.
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
## 2023-10-11 ENCOUNTER — OFFICE VISIT (OUTPATIENT)
Dept: FAMILY MEDICINE CLINIC | Facility: CLINIC | Age: 33
End: 2023-10-11
Payer: COMMERCIAL

## 2023-10-11 VITALS
RESPIRATION RATE: 18 BRPM | DIASTOLIC BLOOD PRESSURE: 78 MMHG | HEART RATE: 84 BPM | WEIGHT: 185.2 LBS | OXYGEN SATURATION: 98 % | SYSTOLIC BLOOD PRESSURE: 118 MMHG | HEIGHT: 60 IN | TEMPERATURE: 98.2 F | BODY MASS INDEX: 36.36 KG/M2

## 2023-10-11 DIAGNOSIS — R11.0 NAUSEA: ICD-10-CM

## 2023-10-11 DIAGNOSIS — R53.83 OTHER FATIGUE: ICD-10-CM

## 2023-10-11 DIAGNOSIS — E55.9 VITAMIN D DEFICIENCY: ICD-10-CM

## 2023-10-11 DIAGNOSIS — G43.E01 CHRONIC MIGRAINE WITH AURA AND WITH STATUS MIGRAINOSUS, NOT INTRACTABLE: ICD-10-CM

## 2023-10-11 DIAGNOSIS — F51.04 PSYCHOPHYSIOLOGICAL INSOMNIA: Primary | ICD-10-CM

## 2023-10-11 DIAGNOSIS — F39 MOOD DISORDER (HCC): ICD-10-CM

## 2023-10-11 DIAGNOSIS — F41.9 ANXIETY: ICD-10-CM

## 2023-10-11 LAB — SL AMB POCT URINE HCG: NORMAL

## 2023-10-11 PROCEDURE — T1015 CLINIC SERVICE: HCPCS | Performed by: FAMILY MEDICINE

## 2023-10-11 PROCEDURE — 81025 URINE PREGNANCY TEST: CPT | Performed by: FAMILY MEDICINE

## 2023-10-11 PROCEDURE — 99214 OFFICE O/P EST MOD 30 MIN: CPT | Performed by: NURSE PRACTITIONER

## 2023-10-11 RX ORDER — PROPRANOLOL HYDROCHLORIDE 80 MG/1
80 CAPSULE, EXTENDED RELEASE ORAL DAILY
Qty: 30 CAPSULE | Refills: 1 | Status: SHIPPED | OUTPATIENT
Start: 2023-10-11

## 2023-10-11 RX ORDER — ERGOCALCIFEROL 1.25 MG/1
50000 CAPSULE ORAL WEEKLY
Qty: 13 CAPSULE | Refills: 1 | Status: SHIPPED | OUTPATIENT
Start: 2023-10-11

## 2023-10-11 RX ORDER — QUETIAPINE FUMARATE 50 MG/1
50 TABLET, FILM COATED ORAL
Qty: 90 TABLET | Refills: 0 | Status: SHIPPED | OUTPATIENT
Start: 2023-10-11

## 2023-10-11 NOTE — PROGRESS NOTES
Assessment/Plan:     Diagnoses and all orders for this visit:    Psychophysiological insomnia  Comments:  Improved with Seroquel at 50 mg hs  Refills provided today  Orders:  -     QUEtiapine (SEROquel) 50 mg tablet; Take 1 tablet (50 mg total) by mouth daily at bedtime    Mood disorder (HCC)  Comments:  Improved with Seroquel at 50 mg hs  Has psychiatry appt scheduled later this month. Orders:  -     QUEtiapine (SEROquel) 50 mg tablet; Take 1 tablet (50 mg total) by mouth daily at bedtime    Anxiety  Comments:  Improved with Seroquel at 50 mg hs  Has psychiatry appt scheduled later this month. Orders:  -     QUEtiapine (SEROquel) 50 mg tablet; Take 1 tablet (50 mg total) by mouth daily at bedtime    Vitamin D deficiency  Comments:  Start weekly high-dose vitamin D  Recheck in 3 months  Orders:  -     ergocalciferol (VITAMIN D2) 50,000 units; Take 1 capsule (50,000 Units total) by mouth once a week  -     Vitamin D 25 hydroxy; Future    Chronic migraine with aura and with status migrainosus, not intractable  Comments:  Start propranolol daily  RTC 1 month for evaluation of symptom control  Alarm findings reviewed  Orders:  -     propranolol (INDERAL LA) 80 mg 24 hr capsule; Take 1 capsule (80 mg total) by mouth daily    Nausea  Comments: With migraines  UPT today for completeness  Orders:  -     POCT urine HCG    Other fatigue  Comments:  Since she ran out of seroquel  UPT completed per pt request; it is negative  Start weekly vitamin D supplement  Orders:  -     POCT urine HCG            Return in about 4 weeks (around 11/8/2023) for  Recheck migraine mgmt. Subjective:        Patient ID: Neal Carrero is a 27 y.o. female. Chief Complaint   Patient presents with    Follow-up       PMH anxiety, mood disorder, vitamin D deficiency, and chronic migraine presents for recheck anxiety, mood disorder, and lab review.  States she has slept better since increasing seroquel dose to 50 mg but has been out of the medication for a few days. Endorses migraines that have increased in frequency. Has been using excedrin migraine to treat same. Denies any alarm findings. Endorses some increased stress recently. Also states she has been having nausea off and on for the past four days. Has IUD in place but would like UPT today. The following portions of the patient's history were reviewed and updated as appropriate: allergies, current medications, past family history, past medical history, past social history, past surgical history and problem list.    Patient Active Problem List   Diagnosis    Closed displaced fracture of shaft of fifth metacarpal bone of left hand    Vitamin D deficiency    Anxiety    Mood disorder (HCC)    Psychophysiological insomnia    Chronic migraine with aura and with status migrainosus, not intractable    Other fatigue       Current Outpatient Medications   Medication Sig Dispense Refill    ergocalciferol (VITAMIN D2) 50,000 units Take 1 capsule (50,000 Units total) by mouth once a week 13 capsule 1    fluticasone (VERAMYST) 27.5 MCG/SPRAY nasal spray 2 sprays into each nostril daily 9.1 mL 2    loratadine (CLARITIN) 10 mg tablet Take 1 tablet (10 mg total) by mouth daily 90 tablet 1    propranolol (INDERAL LA) 80 mg 24 hr capsule Take 1 capsule (80 mg total) by mouth daily 30 capsule 1    QUEtiapine (SEROquel) 50 mg tablet Take 1 tablet (50 mg total) by mouth daily at bedtime 90 tablet 0     No current facility-administered medications for this visit.         Past Medical History:   Diagnosis Date    Allergic     Anxiety         Past Surgical History:   Procedure Laterality Date     SECTION N/A         Social History     Socioeconomic History    Marital status: Single     Spouse name: Not on file    Number of children: Not on file    Years of education: Not on file    Highest education level: Not on file   Occupational History    Not on file   Tobacco Use    Smoking status: Never    Smokeless tobacco: Never   Vaping Use    Vaping Use: Never used   Substance and Sexual Activity    Alcohol use: Not Currently    Drug use: Never    Sexual activity: Yes     Partners: Male     Birth control/protection: I.U.D. Other Topics Concern    Not on file   Social History Narrative    Not on file     Social Determinants of Health     Financial Resource Strain: Not on file   Food Insecurity: Not on file   Transportation Needs: Not on file   Physical Activity: Not on file   Stress: Not on file   Social Connections: Not on file   Intimate Partner Violence: Not on file   Housing Stability: Not on file      . Review of Systems   Constitutional:  Positive for fatigue. Negative for activity change and appetite change. Respiratory:  Negative for cough and shortness of breath. Cardiovascular:  Negative for chest pain. Gastrointestinal:  Positive for nausea. Negative for abdominal pain, diarrhea and vomiting. Genitourinary:  Negative for difficulty urinating, dysuria and hematuria. Musculoskeletal:  Negative for arthralgias and myalgias. Skin:  Negative for rash. Neurological:  Positive for headaches. Negative for dizziness and weakness. Psychiatric/Behavioral:  Negative for agitation, confusion and sleep disturbance. Objective:      /78 (BP Location: Left arm, Patient Position: Sitting, Cuff Size: Standard)   Pulse 84   Temp 98.2 °F (36.8 °C)   Resp 18   Ht 5' (1.524 m)   Wt 84 kg (185 lb 3.2 oz)   SpO2 98%   BMI 36.17 kg/m²          Physical Exam  Vitals and nursing note reviewed. Constitutional:       Appearance: She is obese. HENT:      Head: Normocephalic. Nose: Nose normal.      Mouth/Throat:      Mouth: Mucous membranes are moist.   Eyes:      Conjunctiva/sclera: Conjunctivae normal.   Cardiovascular:      Rate and Rhythm: Normal rate and regular rhythm. Pulmonary:      Effort: Pulmonary effort is normal.      Breath sounds: Normal breath sounds.    Abdominal: General: Abdomen is flat. Bowel sounds are normal.      Palpations: Abdomen is soft. Tenderness: There is no abdominal tenderness. Genitourinary:     Comments: Exam deferred  Skin:     General: Skin is warm and dry. Capillary Refill: Capillary refill takes less than 2 seconds. Neurological:      General: No focal deficit present. Mental Status: She is alert and oriented to person, place, and time.    Psychiatric:         Mood and Affect: Mood normal.         Behavior: Behavior normal.

## 2023-10-25 ENCOUNTER — OFFICE VISIT (OUTPATIENT)
Dept: PSYCHIATRY | Facility: CLINIC | Age: 33
End: 2023-10-25
Payer: COMMERCIAL

## 2023-10-25 DIAGNOSIS — F41.1 GAD (GENERALIZED ANXIETY DISORDER): ICD-10-CM

## 2023-10-25 DIAGNOSIS — F33.1 MDD (MAJOR DEPRESSIVE DISORDER), RECURRENT EPISODE, MODERATE (HCC): ICD-10-CM

## 2023-10-25 DIAGNOSIS — F43.10 POST TRAUMATIC STRESS DISORDER (PTSD): Primary | ICD-10-CM

## 2023-10-25 PROCEDURE — 90792 PSYCH DIAG EVAL W/MED SRVCS: CPT | Performed by: NURSE PRACTITIONER

## 2023-10-25 RX ORDER — PRAZOSIN HYDROCHLORIDE 1 MG/1
1 CAPSULE ORAL
Qty: 30 CAPSULE | Refills: 1 | Status: SHIPPED | OUTPATIENT
Start: 2023-10-25
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
## 2023-10-27 VITALS — DIASTOLIC BLOOD PRESSURE: 83 MMHG | HEART RATE: 88 BPM | SYSTOLIC BLOOD PRESSURE: 114 MMHG

## 2023-10-27 PROBLEM — F41.1 GAD (GENERALIZED ANXIETY DISORDER): Status: ACTIVE | Noted: 2023-10-27

## 2023-10-27 PROBLEM — F33.1 MDD (MAJOR DEPRESSIVE DISORDER), RECURRENT EPISODE, MODERATE (HCC): Status: ACTIVE | Noted: 2023-10-27

## 2023-11-07 ENCOUNTER — OFFICE VISIT (OUTPATIENT)
Dept: FAMILY MEDICINE CLINIC | Facility: CLINIC | Age: 33
End: 2023-11-07
Payer: COMMERCIAL

## 2023-11-07 VITALS
BODY MASS INDEX: 35.16 KG/M2 | OXYGEN SATURATION: 97 % | DIASTOLIC BLOOD PRESSURE: 68 MMHG | SYSTOLIC BLOOD PRESSURE: 108 MMHG | HEIGHT: 61 IN | WEIGHT: 186.2 LBS | HEART RATE: 78 BPM | TEMPERATURE: 98.4 F | RESPIRATION RATE: 18 BRPM

## 2023-11-07 DIAGNOSIS — J30.9 ALLERGIC RHINITIS, UNSPECIFIED SEASONALITY, UNSPECIFIED TRIGGER: ICD-10-CM

## 2023-11-07 DIAGNOSIS — R05.1 ACUTE COUGH: ICD-10-CM

## 2023-11-07 DIAGNOSIS — H66.001 NON-RECURRENT ACUTE SUPPURATIVE OTITIS MEDIA OF RIGHT EAR WITHOUT SPONTANEOUS RUPTURE OF TYMPANIC MEMBRANE: Primary | ICD-10-CM

## 2023-11-07 PROCEDURE — 99213 OFFICE O/P EST LOW 20 MIN: CPT | Performed by: NURSE PRACTITIONER

## 2023-11-07 PROCEDURE — T1015 CLINIC SERVICE: HCPCS | Performed by: NURSE PRACTITIONER

## 2023-11-07 RX ORDER — AMOXICILLIN AND CLAVULANATE POTASSIUM 875; 125 MG/1; MG/1
1 TABLET, FILM COATED ORAL EVERY 12 HOURS SCHEDULED
Qty: 14 TABLET | Refills: 0 | Status: SHIPPED | OUTPATIENT
Start: 2023-11-07 | End: 2023-11-14

## 2023-11-07 RX ORDER — BENZONATATE 200 MG/1
200 CAPSULE ORAL 3 TIMES DAILY PRN
Qty: 20 CAPSULE | Refills: 0 | Status: SHIPPED | OUTPATIENT
Start: 2023-11-07

## 2023-11-07 NOTE — PROGRESS NOTES
Assessment/Plan:     Diagnoses and all orders for this visit:    Non-recurrent acute suppurative otitis media of right ear without spontaneous rupture of tympanic membrane  Comments:  Augmentin BID x 7 days  Supportive care reviewed  RTC if no improvement in 3-5 days  Orders:  -     amoxicillin-clavulanate (AUGMENTIN) 875-125 mg per tablet; Take 1 tablet by mouth every 12 (twelve) hours for 7 days    Acute cough  -     benzonatate (TESSALON) 200 MG capsule; Take 1 capsule (200 mg total) by mouth 3 (three) times a day as needed for cough    Allergic rhinitis, unspecified seasonality, unspecified trigger  Comments:  Stop montelukast due to ineffectiveness  Start loratidine and veramyst daily  RTC 1 month  Orders:  -     fluticasone (VERAMYST) 27.5 MCG/SPRAY nasal spray; 2 sprays into each nostril daily            Return for Next scheduled follow up. Subjective:        Patient ID: Deyanira Estrada is a 27 y.o. female. Chief Complaint   Patient presents with    Cough    Cold Like Symptoms     Right ear blocked       PMH anxiety, mood disorder, vitamin D deficiency, and chronic migraine presents for     Cough  This is a new problem. The current episode started in the past 7 days. The problem has been rapidly worsening. The problem occurs constantly. The cough is Productive of purulent sputum. Associated symptoms include ear congestion, headaches, nasal congestion, postnasal drip, rhinorrhea, a sore throat and wheezing. Pertinent negatives include no chest pain, chills, ear pain, fever, heartburn, hemoptysis, myalgias, rash, shortness of breath, sweats or weight loss. The symptoms are aggravated by exercise. She has tried rest for the symptoms. The treatment provided mild relief.        The following portions of the patient's history were reviewed and updated as appropriate: allergies, current medications, past family history, past medical history, past social history, past surgical history and problem list.    Patient Active Problem List   Diagnosis    Closed displaced fracture of shaft of fifth metacarpal bone of left hand    Vitamin D deficiency    Anxiety    Mood disorder (HCC)    Psychophysiological insomnia    Chronic migraine with aura and with status migrainosus, not intractable    Other fatigue    MDD (major depressive disorder), recurrent episode, moderate (HCC)    DANIELA (generalized anxiety disorder)       Current Outpatient Medications   Medication Sig Dispense Refill    amoxicillin-clavulanate (AUGMENTIN) 875-125 mg per tablet Take 1 tablet by mouth every 12 (twelve) hours for 7 days 14 tablet 0    benzonatate (TESSALON) 200 MG capsule Take 1 capsule (200 mg total) by mouth 3 (three) times a day as needed for cough 20 capsule 0    ergocalciferol (VITAMIN D2) 50,000 units Take 1 capsule (50,000 Units total) by mouth once a week 13 capsule 1    fluticasone (VERAMYST) 27.5 MCG/SPRAY nasal spray 2 sprays into each nostril daily 9.1 mL 2    loratadine (CLARITIN) 10 mg tablet Take 1 tablet (10 mg total) by mouth daily 90 tablet 1    prazosin (MINIPRESS) 1 mg capsule Take 1 capsule (1 mg total) by mouth daily at bedtime 30 capsule 1    propranolol (INDERAL LA) 80 mg 24 hr capsule Take 1 capsule (80 mg total) by mouth daily 30 capsule 1    QUEtiapine (SEROquel) 50 mg tablet Take 1 tablet (50 mg total) by mouth daily at bedtime 90 tablet 0     No current facility-administered medications for this visit.         Past Medical History:   Diagnosis Date    Allergic     Anxiety     Depression         Past Surgical History:   Procedure Laterality Date     SECTION N/A         Social History     Socioeconomic History    Marital status: Single     Spouse name: Not on file    Number of children: Not on file    Years of education: Not on file    Highest education level: Not on file   Occupational History    Not on file   Tobacco Use    Smoking status: Never    Smokeless tobacco: Never   Vaping Use    Vaping Use: Never used   Substance and Sexual Activity    Alcohol use: Not Currently     Comment: drinks very rarely 1 glass per occasion    Drug use: Never    Sexual activity: Yes     Partners: Male     Birth control/protection: I.U.D. Other Topics Concern    Not on file   Social History Narrative    Not on file     Social Determinants of Health     Financial Resource Strain: Not on file   Food Insecurity: Not on file   Transportation Needs: Not on file   Physical Activity: Not on file   Stress: Not on file   Social Connections: Not on file   Intimate Partner Violence: Not on file   Housing Stability: Not on file      . Review of Systems   Constitutional:  Positive for activity change, appetite change and fatigue. Negative for chills, fever and weight loss. HENT:  Positive for congestion, postnasal drip, rhinorrhea, sinus pressure, sinus pain, sore throat and voice change. Negative for ear pain. Respiratory:  Positive for cough and wheezing. Negative for hemoptysis and shortness of breath. Cardiovascular:  Negative for chest pain. Gastrointestinal:  Negative for heartburn. Musculoskeletal:  Negative for myalgias. Skin:  Negative for rash. Neurological:  Positive for headaches. Psychiatric/Behavioral:  Positive for sleep disturbance. Objective:      /68 (BP Location: Left arm, Patient Position: Sitting, Cuff Size: Standard)   Pulse 78   Temp 98.4 °F (36.9 °C)   Resp 18   Ht 5' 1" (1.549 m)   Wt 84.5 kg (186 lb 3.2 oz)   SpO2 97%   BMI 35.18 kg/m²          Physical Exam  Vitals and nursing note reviewed. Constitutional:       Appearance: She is obese. HENT:      Right Ear: A middle ear effusion is present. Tympanic membrane is erythematous and bulging. Left Ear: Tympanic membrane, ear canal and external ear normal.      Nose: Congestion present. Right Turbinates: Swollen. Left Turbinates: Swollen.       Right Sinus: Maxillary sinus tenderness and frontal sinus tenderness present. Left Sinus: Maxillary sinus tenderness and frontal sinus tenderness present. Mouth/Throat:      Mouth: Mucous membranes are moist.      Pharynx: Posterior oropharyngeal erythema present. Eyes:      Conjunctiva/sclera: Conjunctivae normal.   Cardiovascular:      Rate and Rhythm: Normal rate and regular rhythm. Pulmonary:      Effort: Pulmonary effort is normal.      Breath sounds: Normal breath sounds. Abdominal:      General: Abdomen is flat. Bowel sounds are normal.      Palpations: Abdomen is soft. Genitourinary:     Comments: Exam deferred  Skin:     General: Skin is warm and dry. Capillary Refill: Capillary refill takes less than 2 seconds. Neurological:      General: No focal deficit present. Mental Status: She is alert and oriented to person, place, and time.    Psychiatric:         Mood and Affect: Mood normal.         Behavior: Behavior normal.

## 2023-11-22 ENCOUNTER — TELEMEDICINE (OUTPATIENT)
Dept: PSYCHIATRY | Facility: CLINIC | Age: 33
End: 2023-11-22
Payer: COMMERCIAL

## 2023-11-22 DIAGNOSIS — F41.1 GAD (GENERALIZED ANXIETY DISORDER): Primary | ICD-10-CM

## 2023-11-22 PROCEDURE — 99213 OFFICE O/P EST LOW 20 MIN: CPT | Performed by: NURSE PRACTITIONER

## 2023-11-22 RX ORDER — ESCITALOPRAM OXALATE 5 MG/1
5 TABLET ORAL DAILY
Qty: 30 TABLET | Refills: 1 | Status: SHIPPED | OUTPATIENT
Start: 2023-11-22

## 2023-11-24 NOTE — PSYCH
Virtual Regular Visit    Verification of patient location:    Patient is located in the following state in which I hold an active license [place]    Problem List Items Addressed This Visit     DANIELA (generalized anxiety disorder) - Primary    Relevant Medications    escitalopram (LEXAPRO) 5 mg tablet          Encounter provider YOLANDA Camejo    Provider located at    [address] [facility]  [address] [facility] [id number] [phone]    Recent Visits  Date Type Provider Dept   11/22/23 [address] recent visits within past 7 days and meeting all other requirements  Future Appointments  No visits were found meeting these conditions. Showing future appointments within next 150 days and meeting all other requirements           The patient was identified by name and date of birth. Patient was informed that this is a telemedicine visit and [facility]. She agrees to proceed. .  My office door was closed. No one else was in the room. She acknowledged consent and understanding of privacy and security of the video platform. The patient has agreed to participate and understands they can discontinue the visit at any time. Patient is aware this is a billable service.      HPI     Current Outpatient Medications   Medication Sig Dispense Refill   • benzonatate (TESSALON) 200 MG capsule Take 1 capsule (200 mg total) by mouth 3 (three) times a day as needed for cough 20 capsule 0   • ergocalciferol (VITAMIN D2) 50,000 units Take 1 capsule (50,000 Units total) by mouth once a week 13 capsule 1   • escitalopram (LEXAPRO) 5 mg tablet Take 1 tablet (5 mg total) by mouth daily 30 tablet 1   • fluticasone (VERAMYST) 27.5 MCG/SPRAY nasal spray 2 sprays into each nostril daily 9.1 mL 2   • loratadine (CLARITIN) 10 mg tablet Take 1 tablet (10 mg total) by mouth daily 90 tablet 1   • prazosin (MINIPRESS) 1 mg capsule Take 1 capsule (1 mg total) by mouth daily at bedtime 30 capsule 1   • propranolol (INDERAL LA) 80 mg 24 hr capsule Take 1 capsule (80 mg total) by mouth daily 30 capsule 1   • QUEtiapine (SEROquel) 50 mg tablet Take 1 tablet (50 mg total) by mouth daily at bedtime 90 tablet 0     No current facility-administered medications for this visit. Review of Systems  Video Exam    There were no vitals filed for this visit. Physical Exam   As a result of this visit, I have referred the patient for further respiratory evaluation. No    I spent 20 minutes directly with the patient during this visit  VIRTUAL VISIT DISCLAIMER    Gunner Salmeron acknowledges that she has consented to an online visit or consultation. She understands that the online visit is based solely on information provided by her, and that, in the absence of a face-to-face physical evaluation by the physician, the diagnosis she receives is both limited and provisional in terms of accuracy and completeness. This is not intended to replace a full medical face-to-face evaluation by the physician. Gunner Salmeron understands and accepts these terms. MEDICATION MANAGEMENT NOTE        [facility]    Name and Date of Birth:  Gunner Salmeron 27 y.o. 1990 MRN: [id number]    Date of Visit: November 22, 2023    Allergies   Allergen Reactions   • Xanax [Alprazolam] Other (See Comments)     Patient reports history of addiction to xanax. Want to ensure she is not prescribed this medication again. Visit Time    Visit Start Time: 9320  Visit Stop Time: 1320  Total Visit Duration:  20 minutes    SUBJECTIVE:    Sudhakar Argueta is seen today for a follow up for Major Depressive Disorder and Generalized Anxiety Disorder. She continues to improve slowly since the last visit. Sudhakar Argueta was seen virtually today for medication management follow up.  She was last seen by this provider on 10/25/23. She reports today that the prazosin has helped her sleep through the night as long as she takes her seroquel. She states that she had missed a few doses of the seroquel due to her not getting her refill in time. While she was with out her seroquel she had paranoid and anxious thoughts, thinking someone was going to come into the house, and thinking that someone was actually in the house. She was also sick with a sinus infection at the same time. She has had moments of depression during this time as well. She denies any SI/HI. She has picked up her seroquel at this time and has taken it one night since picking it up. She is calm and appropriate in conversation, although very tired. Because she has not been taking her medications regularly, and it is difficult to gauge if her medications are effective if she is not taking them as scheduled, it was agreed that she will continue her medication as ordered at this time, and will call this provider if any concerns or issues arise prior to next scheduled appointment. She will follow up in one month. She denies any side effects from current psychiatric medications. PLAN:  Continue Lexapro 5mg PO daily   Continue Prazosin 1mg PO HS   Continue Seroquel 50mg PO HS  Follow up in one month  She will call sooner with concerns or issues if they arise prior to scheduled appointment    Aware of 24 hour and weekend coverage for urgent situations accessed by calling [facility] main practice number  Referral for individual psychotherapy  Medication management every 1 month  Aware of need to follow up with family physician for medical issues    Diagnoses and all orders for this visit:    DANIELA (generalized anxiety disorder)  -     escitalopram (LEXAPRO) 5 mg tablet;  Take 1 tablet (5 mg total) by mouth daily        Current Rating Scores:     Current PHQ-9   PHQ-2/9 Depression Screening    Little interest or pleasure in doing things: 1 - several days  Feeling down, depressed, or hopeless: 1 - several days  Trouble falling or staying asleep, or sleeping too much: 2 - more than half the days  Feeling tired or having little energy: 2 - more than half the days  Poor appetite or overeatin - several days  Feeling bad about yourself - or that you are a failure or have let yourself or your family down: 1 - several days  Trouble concentrating on things, such as reading the newspaper or watching television: 2 - more than half the days  Moving or speaking so slowly that other people could have noticed. Or the opposite - being so fidgety or restless that you have been moving around a lot more than usual: 1 - several days  Thoughts that you would be better off dead, or of hurting yourself in some way: 0 - not at all  PHQ-9 Score: 11   PHQ-9 Interpretation: Moderate depression          Current Outpatient Medications on File Prior to Visit   Medication Sig Dispense Refill   • benzonatate (TESSALON) 200 MG capsule Take 1 capsule (200 mg total) by mouth 3 (three) times a day as needed for cough 20 capsule 0   • ergocalciferol (VITAMIN D2) 50,000 units Take 1 capsule (50,000 Units total) by mouth once a week 13 capsule 1   • fluticasone (VERAMYST) 27.5 MCG/SPRAY nasal spray 2 sprays into each nostril daily 9.1 mL 2   • loratadine (CLARITIN) 10 mg tablet Take 1 tablet (10 mg total) by mouth daily 90 tablet 1   • prazosin (MINIPRESS) 1 mg capsule Take 1 capsule (1 mg total) by mouth daily at bedtime 30 capsule 1   • propranolol (INDERAL LA) 80 mg 24 hr capsule Take 1 capsule (80 mg total) by mouth daily 30 capsule 1   • QUEtiapine (SEROquel) 50 mg tablet Take 1 tablet (50 mg total) by mouth daily at bedtime 90 tablet 0     No current facility-administered medications on file prior to visit. Psychotherapy Provided:     Individual psychotherapy provided: Yes  Counseling was provided during the session today for 16 minutes.   Supportive counseling provided. Medication education provided to Mik. Recent stressor including ongoing anxiety discussed with Mik. Coping strategies reviewed with Mik. Importance of medication and treatment compliance reviewed with Mik. Importance of follow up with family physician for medical issues reviewed with Mik. Reassurance and supportive therapy provided. Crisis/safety plan discussed with Mik. Patient will call prior to scheduled appointment if they have any issues or concerns. Patient understands they can access the office by calling the main number at any time if they are in crisis. They also understand they can call their [facility]'s crisis number or go to their nearest ED if suicidal ideation increases or if they develop a plan or intent. HPI ROS Appetite Changes and Sleep:     She reports difficulty falling asleep, frequent awakenings, adequate appetite, low energy.  Denies homicidal ideation, denies suicidal ideation    Review Of Systems:      HPI ROS:               Medication Side Effects:  denies   Depression (10 worst): increased   Anxiety (10 worst): increased   Safety concerns (SI, HI, etc): denies   Sleep: decreased   Energy: low   Appetite: adequate     General decreased functioning and sleep disturbances   Personality no change in personality   Constitutional as noted in HPI   ENT negative   Cardiovascular negative   Respiratory negative   Gastrointestinal negative   Genitourinary negative   Musculoskeletal negative   Integumentary negative   Neurological negative   Endocrine negative   Other Symptoms none, all other systems are negative     Mental Status Evaluation:    Appearance Appropriately dressed and Good eye contact   Behavior calm and cooperative   Mood anxious and depressed  Depression Scale -  increased  of 10 (0 = No depression)  Anxiety Scale -  increased  of 10 (0 = No anxiety)   Speech Normal rate and volume   Affect appropriate and mood-congruent   Thought Processes Goal directed and coherent   Thought Content Does not verbalize delusional material   Associations Tightly connected   Perceptual Disturbances Denies hallucinations and does not appear to be responding to internal stimuli   Risk Potential Suicidal/Homicidal Ideation - No evidence of suicidal or homicidal ideation and patient does not verbalize suicidal or homicidal ideation  Risk of Violence - No evidence of risk for violence found on assessment  Risk of Self Mutilation - No evidence of risk for self mutilation found on assessment   Orientation oriented to person, place, time/date, and situation   Memory recent and remote memory grossly intact   Consciousness alert and awake   Attention/Concentration attention span and concentration are age appropriate   Insight fair   Judgement fair   Muscle Strength and Gait normal muscle strength and normal muscle tone, normal gait/station and normal balance   Motor Activity no abnormal movements   Language no difficulty naming common objects, no difficulty repeating a phrase, no difficulty writing a sentence   Fund of Knowledge adequate knowledge of current events  adequate fund of knowledge regarding past history  adequate fund of knowledge regarding vocabulary      Past Psychiatric History Update:     Inpatient Psychiatric Admission Since Last Encounter:   no  Changes to Outpatient Psychiatric Treatment Team:    no  Suicide Attempt Or Self Mutilation Since Last Encounter:   no  Incidence of Violent Behavior Since Last Encounter:   no    Traumatic History Update:     New Onset of Abuse Since Last Encounter:   no  Traumatic Events Since Last Encounter:   no    Past Medical History:    Past Medical History:   Diagnosis Date   • Allergic    • Anxiety    • Depression         Past Surgical History:   Procedure Laterality Date   •  SECTION N/A      Allergies   Allergen Reactions   • Xanax [Alprazolam] Other (See Comments)     Patient reports history of addiction to xanax. Want to ensure she is not prescribed this medication again. Substance Abuse History:    Social History     Substance and Sexual Activity   Alcohol Use Not Currently    Comment: drinks very rarely 1 glass per occasion     Social History     Substance and Sexual Activity   Drug Use Never     Social History:    Social History     Socioeconomic History   • Marital status: Single     Spouse name: Not on file   • Number of children: Not on file   • Years of education: Not on file   • Highest education level: Not on file   Occupational History   • Not on file   Tobacco Use   • Smoking status: Never   • Smokeless tobacco: Never   Vaping Use   • Vaping Use: Never used   Substance and Sexual Activity   • Alcohol use: Not Currently     Comment: drinks very rarely 1 glass per occasion   • Drug use: Never   • Sexual activity: Yes     Partners: Male     Birth control/protection: I.U.D. Other Topics Concern   • Not on file   Social History Narrative   • Not on file     Social Determinants of Health     Financial Resource Strain: Not on file   Food Insecurity: Not on file   Transportation Needs: Not on file   Physical Activity: Not on file   Stress: Not on file   Social Connections: Not on file   Intimate Partner Violence: Not on file   Housing Stability: Not on file     Family Psychiatric History:     Family History   Adopted: Yes   Problem Relation Age of Onset   • Drug abuse Mother    • Drug abuse Father      History Review: The following portions of the patient's history were reviewed and updated as appropriate: allergies, current medications, past family history, past medical history, past social history, past surgical history, and problem list     OBJECTIVE:     Vital signs in last 24 hours: There were no vitals filed for this visit.   Laboratory Results: Recent Labs (last 2 months):   Office Visit on 10/11/2023   Component Date Value   • URINE HCG 10/11/2023 neg      I have personally reviewed all pertinent laboratory/tests results. Suicide/Homicide Risk Assessment:    Risk of Harm to Self:  The following ratings are based on assessment at the time of the interview  Recent Specific Risk Factors include: current depressive symptoms, current anxiety symptoms  Demographic risk factors include:   Historical Risk Factors include: chronic depressive symptoms, chronic anxiety symptoms, history of abuse, history of traumatic experiences, some paranoia  Protective Factors: no current suicidal ideation, access to mental health treatment, being a parent, compliant with medications, compliant with mental health treatment, connection to own children, resiliency, responsibilities and duties to others, stable living environment, sense of determination, strong relationships, supportive family  Weapons: none. The following steps have been taken to ensure weapons are properly secured: not applicable  Based on today's assessment, Bob Jerome presents the following risk of harm to self: low    Risk of Harm to Others: The following ratings are based on assessment at the time of the interview  Protective Factors: no current homicidal ideation  Based on today's assessment, Bob Jerome presents the following risk of harm to others: none    The following interventions are recommended: contracts for safety at present - agrees to go to ED if feeling unsafe, referral for psychotherapy, return in 1 month for reassessment, contracts for safety at present - agrees to call Crisis Intervention Service if feeling unsafe    Medications Risks/Benefits:      Risks, Benefits And Possible Side Effects Of Medications:    Discussed risks and benefits of treatment with patient including risk of suicidality, serotonin syndrome, increased QTc interval and SIADH related to treatment with antidepressants;  Risk of induction of manic symptoms in certain patient populations and risk of parkinsonian symptoms, metabolic syndrome, tardive dyskinesia and neuroleptic malignant syndrome related to treatment with antipsychotic medications     Controlled Medication Discussion:     Not applicable    Treatment Plan:    Due for update/Updated:   yes  Last treatment plan done 11/22/23 by YOLANDA Deleon. Treatment Plan due on 5/22/24. YOLANDA Russell 11/27/23    This note was shared with patient.

## 2023-11-27 NOTE — BH TREATMENT PLAN
TREATMENT PLAN (Medication Management Only)        [address] [facility]    Name and Date of Birth:  Renata Santana 27 y.o. 1990  Date of Treatment Plan: November 22, 2023  Diagnosis/Diagnoses:    1. DANIELA (generalized anxiety disorder)      Strengths/Personal Resources for Self-Care: supportive family, ability to communicate needs. Area/Areas of need (in own words): anxiety symptoms  1. Long Term Goal: continue improvement in acceptable anxiety level. Target Date:6 months - 5/22/2024  Person/Persons responsible for completion of goal: Gela  2. Short Term Objective (s) - How will we reach this goal?:   A. Provider new recommended medication/dosage changes and/or continue medication(s): continue current medications as prescribed. B. Call supportive people when needed . C. Get regular sleep every night . Target Date:6 months - 5/22/2024  Person/Persons Responsible for Completion of Goal: [facility]  Progress Towards Goals: starting treatment  Treatment Modality: medication management every 1 month, referral for individual psychotherapy, medication education at every visit  Review due 180 days from date of this plan: 6 months - 5/22/2024  Expected length of service: ongoing treatment  My Physician/PA/NP and I have developed this plan together and I agree to work on the goals and objectives. I understand the treatment goals that were developed for my treatment.

## 2024-01-03 ENCOUNTER — OFFICE VISIT (OUTPATIENT)
Dept: URGENT CARE | Facility: MEDICAL CENTER | Age: 34
End: 2024-01-03
Payer: COMMERCIAL

## 2024-01-03 VITALS
WEIGHT: 190.2 LBS | TEMPERATURE: 97.8 F | OXYGEN SATURATION: 98 % | BODY MASS INDEX: 37.34 KG/M2 | SYSTOLIC BLOOD PRESSURE: 112 MMHG | HEIGHT: 60 IN | HEART RATE: 71 BPM | DIASTOLIC BLOOD PRESSURE: 70 MMHG | RESPIRATION RATE: 22 BRPM

## 2024-01-03 DIAGNOSIS — J06.9 ACUTE URI: Primary | ICD-10-CM

## 2024-01-03 DIAGNOSIS — R05.1 ACUTE COUGH: ICD-10-CM

## 2024-01-03 PROCEDURE — 99212 OFFICE O/P EST SF 10 MIN: CPT

## 2024-01-03 RX ORDER — BROMPHENIRAMINE MALEATE, PSEUDOEPHEDRINE HYDROCHLORIDE, AND DEXTROMETHORPHAN HYDROBROMIDE 2; 30; 10 MG/5ML; MG/5ML; MG/5ML
5 SYRUP ORAL 4 TIMES DAILY PRN
Qty: 120 ML | Refills: 0 | Status: SHIPPED | OUTPATIENT
Start: 2024-01-03

## 2024-01-03 RX ORDER — AZITHROMYCIN 250 MG/1
TABLET, FILM COATED ORAL
Qty: 6 TABLET | Refills: 0 | Status: SHIPPED | OUTPATIENT
Start: 2024-01-03 | End: 2024-01-07

## 2024-01-03 NOTE — PROGRESS NOTES
[facility]        NAME: Gela Bell is a 33 y.o. female  : 1990    MRN: [id number]  DATE: January 3, 2024  TIME: 10:03 AM    Assessment and Plan   Acute cough [R05.1]  1. Acute cough  azithromycin (ZITHROMAX) 250 mg tablet    brompheniramine-pseudoephedrine-DM 30-2-10 MG/5ML syrup      2. Acute URI  azithromycin (ZITHROMAX) 250 mg tablet    brompheniramine-pseudoephedrine-DM 30-2-10 MG/5ML syrup            Patient Instructions       Follow up with PCP in 3-5 days.  Proceed to  ER if symptoms worsen.    Chief Complaint     Chief Complaint   Patient presents with   • Cough     Patient states she has had a productive cough for the past week. Patient also states she is having ear fullness on both sides         History of Present Illness       Patient here with cough and sore throat which started around  Taking mucinex OTC without much relief. No shortness of breath out of her normal asthma related. Started with lower right rib/URQ pain with coughing while here. Reports she has been coughing a lot. Did have a similar illness in 2023.         Review of Systems   Review of Systems   Constitutional:  Negative for chills, fatigue and fever.   HENT:  Positive for congestion, postnasal drip, rhinorrhea, sneezing and sore throat. Negative for ear pain, sinus pressure, sinus pain and trouble swallowing.    Respiratory:  Negative for cough and shortness of breath.    Cardiovascular:  Negative for chest pain and palpitations.   Gastrointestinal:  Negative for abdominal pain, constipation, diarrhea, nausea and vomiting.   Musculoskeletal:  Negative for arthralgias, back pain and myalgias.   Skin:  Negative for color change and rash.   All other systems reviewed and are negative.        Current Medications       Current Outpatient Medications:   •  azithromycin (ZITHROMAX) 250 mg tablet, Take 2 tablets today then 1 tablet daily x 4 days, Disp: 6 tablet, Rfl: 0  •  brompheniramine-pseudoephedrine-DM  30-2-10 MG/5ML syrup, Take 5 mL by mouth 4 (four) times a day as needed for congestion, cough or allergies, Disp: 120 mL, Rfl: 0  •  ergocalciferol (VITAMIN D2) 50,000 units, Take 1 capsule (50,000 Units total) by mouth once a week, Disp: 13 capsule, Rfl: 1  •  escitalopram (LEXAPRO) 5 mg tablet, Take 1 tablet (5 mg total) by mouth daily, Disp: 30 tablet, Rfl: 1  •  fluticasone (VERAMYST) 27.5 MCG/SPRAY nasal spray, 2 sprays into each nostril daily, Disp: 9.1 mL, Rfl: 2  •  loratadine (CLARITIN) 10 mg tablet, Take 1 tablet (10 mg total) by mouth daily, Disp: 90 tablet, Rfl: 1  •  prazosin (MINIPRESS) 1 mg capsule, Take 1 capsule (1 mg total) by mouth daily at bedtime, Disp: 30 capsule, Rfl: 1  •  propranolol (INDERAL LA) 80 mg 24 hr capsule, Take 1 capsule (80 mg total) by mouth daily, Disp: 30 capsule, Rfl: 1  •  QUEtiapine (SEROquel) 50 mg tablet, Take 1 tablet (50 mg total) by mouth daily at bedtime, Disp: 90 tablet, Rfl: 0  •  benzonatate (TESSALON) 200 MG capsule, Take 1 capsule (200 mg total) by mouth 3 (three) times a day as needed for cough (Patient not taking: Reported on 1/3/2024), Disp: 20 capsule, Rfl: 0    Current Allergies     Allergies as of 2024 - Reviewed 2024   Allergen Reaction Noted   • Xanax [alprazolam] Other (See Comments) 2023            The following portions of the patient's history were reviewed and updated as appropriate: allergies, current medications, past family history, past medical history, past social history, past surgical history and problem list.     Past Medical History:   Diagnosis Date   • Allergic    • Anxiety    • Depression        Past Surgical History:   Procedure Laterality Date   •  SECTION N/A        Family History   Adopted: Yes   Problem Relation Age of Onset   • Drug abuse Mother    • Drug abuse Father          Medications have been verified.        Objective   /70   Pulse 71   Temp 97.8 °F (36.6 °C) (Temporal)   Resp 22   Ht 5'  (1.524 m)   Wt 86.3 kg (190 lb 3.2 oz)   SpO2 98%   BMI 37.15 kg/m²        Physical Exam     Physical Exam  Vitals and nursing note reviewed.   Constitutional:       General: She is not in acute distress.     Appearance: Normal appearance. She is normal weight. She is not ill-appearing.   HENT:      Head: Normocephalic and atraumatic.      Right Ear: Ear canal and external ear normal. A middle ear effusion is present. Tympanic membrane is injected, erythematous and bulging.      Left Ear: Ear canal and external ear normal. There is impacted cerumen.      Nose: Congestion and rhinorrhea present. Rhinorrhea is clear.      Right Sinus: Maxillary sinus tenderness present.      Left Sinus: Maxillary sinus tenderness present.      Mouth/Throat:      Lips: Pink.      Mouth: Mucous membranes are moist.      Pharynx: Oropharynx is clear. Uvula midline. No pharyngeal swelling, oropharyngeal exudate, posterior oropharyngeal erythema or uvula swelling.      Tonsils: No tonsillar exudate or tonsillar abscesses. 0 on the right. 0 on the left.   Eyes:      Extraocular Movements: Extraocular movements intact.      Conjunctiva/sclera: Conjunctivae normal.      Pupils: Pupils are equal, round, and reactive to light.   Cardiovascular:      Rate and Rhythm: Normal rate and regular rhythm.      Pulses: Normal pulses.      Heart sounds: Normal heart sounds.   Pulmonary:      Effort: Pulmonary effort is normal.      Breath sounds: Normal breath sounds.   Abdominal:      General: Abdomen is flat. Bowel sounds are normal.      Palpations: Abdomen is soft.   Musculoskeletal:         General: Normal range of motion.      Cervical back: Normal range of motion and neck supple.   Skin:     General: Skin is warm.      Capillary Refill: Capillary refill takes less than 2 seconds.   Neurological:      General: No focal deficit present.      Mental Status: She is alert and oriented to person, place, and time.   Psychiatric:         Mood and  Affect: Mood normal.         Behavior: Behavior normal.

## 2024-01-03 NOTE — PATIENT INSTRUCTIONS
You may take over the counter Tylenol (Acetaminophen) and/or Motrin (Ibuprofen) as needed, as directed on packaging. Be sure to get plenty of rest, and drinking fluids to remain hydrated.     Please follow up with your primary provider in the next several days. Should you have any worsening of symptoms, or lack of improvement please be re-evaluated. If needed for significant concerns, consider 911 or ER evaluation.     Over the counter decongestants can be used, only as directed on the box. However if you have any history of cardiac disease or high blood pressure these should be avoided, as we caution the use of these since they can make place strain on your heart and cause increase in blood pressure. It is recommended in lieu of decongestants to use cool mist vaporizer, saline nasal spray to relieve nasal congestion. It is also important to remain hydrated and drink plenty of fluids (avoiding caffeine and alcohol).

## 2024-01-05 ENCOUNTER — TELEMEDICINE (OUTPATIENT)
Dept: PSYCHIATRY | Facility: CLINIC | Age: 34
End: 2024-01-05
Payer: COMMERCIAL

## 2024-01-05 DIAGNOSIS — F33.1 MDD (MAJOR DEPRESSIVE DISORDER), RECURRENT EPISODE, MODERATE (HCC): ICD-10-CM

## 2024-01-05 DIAGNOSIS — F41.1 GAD (GENERALIZED ANXIETY DISORDER): Primary | ICD-10-CM

## 2024-01-05 DIAGNOSIS — F43.10 POST TRAUMATIC STRESS DISORDER (PTSD): ICD-10-CM

## 2024-01-05 PROCEDURE — 99213 OFFICE O/P EST LOW 20 MIN: CPT | Performed by: NURSE PRACTITIONER

## 2024-01-05 RX ORDER — QUETIAPINE FUMARATE 50 MG/1
50 TABLET, FILM COATED ORAL
Qty: 90 TABLET | Refills: 0 | Status: SHIPPED | OUTPATIENT
Start: 2024-01-05

## 2024-01-05 RX ORDER — PRAZOSIN HYDROCHLORIDE 1 MG/1
1 CAPSULE ORAL
Qty: 90 CAPSULE | Refills: 0 | Status: SHIPPED | OUTPATIENT
Start: 2024-01-05

## 2024-01-05 RX ORDER — ESCITALOPRAM OXALATE 5 MG/1
5 TABLET ORAL DAILY
Qty: 90 TABLET | Refills: 0 | Status: SHIPPED | OUTPATIENT
Start: 2024-01-05

## 2024-01-08 PROBLEM — F43.10 POST TRAUMATIC STRESS DISORDER (PTSD): Status: ACTIVE | Noted: 2024-01-08

## 2024-01-08 NOTE — PSYCH
Virtual Regular Visit    Verification of patient location:    Patient is located in the following state in which I hold an active license [place]    Problem List Items Addressed This Visit     Post traumatic stress disorder (PTSD)    Relevant Medications    escitalopram (LEXAPRO) 5 mg tablet    prazosin (MINIPRESS) 1 mg capsule    QUEtiapine (SEROquel) 50 mg tablet    MDD (major depressive disorder), recurrent episode, moderate (HCC)    Relevant Medications    escitalopram (LEXAPRO) 5 mg tablet    QUEtiapine (SEROquel) 50 mg tablet    DANIELA (generalized anxiety disorder) - Primary    Relevant Medications    escitalopram (LEXAPRO) 5 mg tablet    QUEtiapine (SEROquel) 50 mg tablet          Encounter provider YOLANDA Liriano    Provider located at    [address] [address] [phone]    Recent Visits  Date Type Provider Dept   01/05/24 Telemedicine YOLANDA Liriano  [facility]   Showing recent visits within past 7 days and meeting all other requirements  Future Appointments  No visits were found meeting these conditions.  Showing future appointments within next 150 days and meeting all other requirements           The patient was identified by name and date of birth. Patient was informed that this is a telemedicine visit and that the visit is being conducted throughthe Epic Embedded platform. She agrees to proceed..  My office door was closed. No one else was in the room.  She acknowledged consent and understanding of privacy and security of the video platform. The patient has agreed to participate and understands they can discontinue the visit at any time.    Patient is aware this is a billable service.     HPI     Current Outpatient Medications   Medication Sig Dispense Refill   • brompheniramine-pseudoephedrine-DM 30-2-10 MG/5ML syrup Take 5 mL by mouth 4 (four) times a day as needed for congestion, cough  or allergies 120 mL 0   • ergocalciferol (VITAMIN D2) 50,000 units Take 1 capsule (50,000 Units total) by mouth once a week 13 capsule 1   • escitalopram (LEXAPRO) 5 mg tablet Take 1 tablet (5 mg total) by mouth daily 90 tablet 0   • fluticasone (VERAMYST) 27.5 MCG/SPRAY nasal spray 2 sprays into each nostril daily 9.1 mL 2   • loratadine (CLARITIN) 10 mg tablet Take 1 tablet (10 mg total) by mouth daily 90 tablet 1   • prazosin (MINIPRESS) 1 mg capsule Take 1 capsule (1 mg total) by mouth daily at bedtime 90 capsule 0   • propranolol (INDERAL LA) 80 mg 24 hr capsule Take 1 capsule (80 mg total) by mouth daily 30 capsule 1   • QUEtiapine (SEROquel) 50 mg tablet Take 1 tablet (50 mg total) by mouth daily at bedtime 90 tablet 0   • benzonatate (TESSALON) 200 MG capsule Take 1 capsule (200 mg total) by mouth 3 (three) times a day as needed for cough (Patient not taking: Reported on 1/3/2024) 20 capsule 0     No current facility-administered medications for this visit.       Review of Systems  Video Exam    There were no vitals filed for this visit.    Physical Exam   As a result of this visit, I have referred the patient for further respiratory evaluation. No    I spent 20 minutes directly with the patient during this visit  VIRTUAL VISIT DISCLAIMER    Gela Bell acknowledges that she has consented to an online visit or consultation. She understands that the online visit is based solely on information provided by her, and that, in the absence of a face-to-face physical evaluation by the physician, the diagnosis she receives is both limited and provisional in terms of accuracy and completeness. This is not intended to replace a full medical face-to-face evaluation by the physician. Gela Bell understands and accepts these terms.    MEDICATION MANAGEMENT NOTE        [facility]    Name and Date of Birth:  Gela Bell 33 y.o. 1990 MRN: [id number]    Date  "of Visit: January 5, 2024    Allergies   Allergen Reactions   • Xanax [Alprazolam] Other (See Comments)     Patient reports history of addiction to xanax. Want to ensure she is not prescribed this medication again.       Visit Time    Visit Start Time: 1300  Visit Stop Time: 1320  Total Visit Duration:  20 minutes    SUBJECTIVE:    Gela is seen today for a follow up for Major Depressive Disorder and Generalized Anxiety Disorder. She continues to experience on and off symptoms since the last visit. Gela was seen virtually today for medication management follow up. She was last seen by this provider on 11/22/23.  She reports today that she continues to be sick with what ever was she had at her last visit.  She states that her sleep has been somewhat somewhat increased since she has been sick.  Her depression has been \"okay-lalita\".  Her appetite has been good.  She denies suicidal and homicidal ideation, denies any auditory or visual hallucinations.  She does sound congested during conversation.  Her eye contact is good.  She is friendly and appropriate.  She reports some good news, stating that her mother-in-law is moving down the street next month.  She states she has a good relationship with her and is happy that she is moving closer.  We agreed to not make any med changes until she is feeling better and her health is improved.  She understands that a lot of her sleep issues currently may be due to her illness.  We will follow up in 1 month.    She denies any side effects from current psychiatric medications.    PLAN:  Continue Lexapro 5mg PO daily   Continue Prazosin 1mg PO HS   Continue Seroquel 50mg PO HS  Follow up in one month  She will call sooner with concerns or issues if they arise prior to scheduled appointment    Aware of 24 hour and weekend coverage for urgent situations accessed by calling [facility] main practice number  Referral for individual psychotherapy  Medication " management every 1 month  Aware of need to follow up with family physician for medical issues    Diagnoses and all orders for this visit:    DANIELA (generalized anxiety disorder)  -     escitalopram (LEXAPRO) 5 mg tablet; Take 1 tablet (5 mg total) by mouth daily    MDD (major depressive disorder), recurrent episode, moderate (HCC)  -     escitalopram (LEXAPRO) 5 mg tablet; Take 1 tablet (5 mg total) by mouth daily  -     QUEtiapine (SEROquel) 50 mg tablet; Take 1 tablet (50 mg total) by mouth daily at bedtime    Post traumatic stress disorder (PTSD)  -     prazosin (MINIPRESS) 1 mg capsule; Take 1 capsule (1 mg total) by mouth daily at bedtime      I  Current Outpatient Medications on File Prior to Visit   Medication Sig Dispense Refill   • [] azithromycin (ZITHROMAX) 250 mg tablet Take 2 tablets today then 1 tablet daily x 4 days 6 tablet 0   • brompheniramine-pseudoephedrine-DM 30-2-10 MG/5ML syrup Take 5 mL by mouth 4 (four) times a day as needed for congestion, cough or allergies 120 mL 0   • ergocalciferol (VITAMIN D2) 50,000 units Take 1 capsule (50,000 Units total) by mouth once a week 13 capsule 1   • fluticasone (VERAMYST) 27.5 MCG/SPRAY nasal spray 2 sprays into each nostril daily 9.1 mL 2   • loratadine (CLARITIN) 10 mg tablet Take 1 tablet (10 mg total) by mouth daily 90 tablet 1   • propranolol (INDERAL LA) 80 mg 24 hr capsule Take 1 capsule (80 mg total) by mouth daily 30 capsule 1   • benzonatate (TESSALON) 200 MG capsule Take 1 capsule (200 mg total) by mouth 3 (three) times a day as needed for cough (Patient not taking: Reported on 1/3/2024) 20 capsule 0     No current facility-administered medications on file prior to visit.       Psychotherapy Provided:     Individual psychotherapy provided: Yes  Counseling was provided during the session today for 16 minutes.  Supportive counseling provided.  Medication education provided to Gela.  Recent stressor including ongoing anxiety discussed with  "Gela.   Coping strategies reviewed with Gela.   Importance of medication and treatment compliance reviewed with Gela.  Importance of follow up with family physician for medical issues reviewed with Gela.  Reassurance and supportive therapy provided.   Crisis/safety plan discussed with Gela. Patient will call prior to scheduled appointment if they have any issues or concerns.  Patient understands they can access the office by calling the main number at any time if they are in crisis.  They also understand they can call their [facility]'s crisis number or go to their nearest ED if suicidal ideation increases or if they develop a plan or intent.     HPI ROS Appetite Changes and Sleep:     She reports  incrased sleep due to illness , adequate appetite, low energy. Denies homicidal ideation, denies suicidal ideation    Review Of Systems:      HPI ROS:               Medication Side Effects:   denies   Depression (10 worst): \"Ok-lalita\" increased   Anxiety (10 worst): No change increased   Safety concerns (SI, HI, etc): denies denies   Sleep: Increased due to illness decreased   Energy: low low   Appetite: adequate adequate     General decreased functioning and sleep disturbances   Personality no change in personality   Constitutional as noted in HPI   ENT negative   Cardiovascular negative   Respiratory as noted in HPI and congested and coughing   Gastrointestinal negative   Genitourinary negative   Musculoskeletal negative   Integumentary negative   Neurological negative   Endocrine negative   Other Symptoms none, all other systems are negative     Mental Status Evaluation:    Appearance Appropriately dressed and Good eye contact   Behavior calm and cooperative   Mood anxious and depressed  Depression Scale -  \"ok-lalita\"  of 10 (0 = No depression)  Anxiety Scale -  \"no change\"  of 10 (0 = No anxiety)   Speech Normal rate and volume   Affect appropriate and mood-congruent   Thought Processes Goal directed and " coherent   Thought Content Does not verbalize delusional material   Associations Tightly connected   Perceptual Disturbances Denies hallucinations and does not appear to be responding to internal stimuli   Risk Potential Suicidal/Homicidal Ideation - No evidence of suicidal or homicidal ideation and patient does not verbalize suicidal or homicidal ideation  Risk of Violence - No evidence of risk for violence found on assessment  Risk of Self Mutilation - No evidence of risk for self mutilation found on assessment   Orientation oriented to person, place, time/date, and situation   Memory recent and remote memory grossly intact   Consciousness alert and awake   Attention/Concentration attention span and concentration are age appropriate   Insight fair   Judgement fair   Muscle Strength and Gait normal muscle strength and normal muscle tone, normal gait/station and normal balance   Motor Activity no abnormal movements   Language no difficulty naming common objects, no difficulty repeating a phrase, no difficulty writing a sentence   Fund of Knowledge adequate knowledge of current events  adequate fund of knowledge regarding past history  adequate fund of knowledge regarding vocabulary      Past Psychiatric History Update:     Inpatient Psychiatric Admission Since Last Encounter:   no  Changes to Outpatient Psychiatric Treatment Team:    no  Suicide Attempt Or Self Mutilation Since Last Encounter:   no  Incidence of Violent Behavior Since Last Encounter:   no    Traumatic History Update:     New Onset of Abuse Since Last Encounter:   no  Traumatic Events Since Last Encounter:   no    Past Medical History:    Past Medical History:   Diagnosis Date   • Allergic    • Anxiety    • Depression         Past Surgical History:   Procedure Laterality Date   •  SECTION N/A      Allergies   Allergen Reactions   • Xanax [Alprazolam] Other (See Comments)     Patient reports history of addiction to xanax. Want to ensure she is  not prescribed this medication again.     Substance Abuse History:    Social History     Substance and Sexual Activity   Alcohol Use Not Currently    Comment: drinks very rarely 1 glass per occasion     Social History     Substance and Sexual Activity   Drug Use Never     Social History:    Social History     Socioeconomic History   • Marital status: Single     Spouse name: Not on file   • Number of children: Not on file   • Years of education: Not on file   • Highest education level: Not on file   Occupational History   • Not on file   Tobacco Use   • Smoking status: Never   • Smokeless tobacco: Never   Vaping Use   • Vaping status: Never Used   Substance and Sexual Activity   • Alcohol use: Not Currently     Comment: drinks very rarely 1 glass per occasion   • Drug use: Never   • Sexual activity: Yes     Partners: Male     Birth control/protection: I.U.D.   Other Topics Concern   • Not on file   Social History Narrative   • Not on file     Social Determinants of Health     Financial Resource Strain: Not on file   Food Insecurity: Not on file   Transportation Needs: Not on file   Physical Activity: Not on file   Stress: Not on file   Social Connections: Not on file   Intimate Partner Violence: Not on file   Housing Stability: Not on file     Family Psychiatric History:     Family History   Adopted: Yes   Problem Relation Age of Onset   • Drug abuse Mother    • Drug abuse Father      History Review:The following portions of the patient's history were reviewed and updated as appropriate: allergies, current medications, past family history, past medical history, past social history, past surgical history, and problem list     OBJECTIVE:     Vital signs in last 24 hours:    There were no vitals filed for this visit.  Laboratory Results: Recent Labs (last 2 months):   No visits with results within 2 Month(s) from this visit.   Latest known visit with results is:   Office Visit on 10/11/2023   Component Date Value   •  URINE HCG 10/11/2023 neg      I have personally reviewed all pertinent laboratory/tests results.    Suicide/Homicide Risk Assessment:    Risk of Harm to Self:  The following ratings are based on assessment at the time of the interview  Recent Specific Risk Factors include: current depressive symptoms, current anxiety symptoms  Demographic risk factors include:   Historical Risk Factors include: chronic depressive symptoms, chronic anxiety symptoms, history of abuse, history of traumatic experiences, some paranoia  Protective Factors: no current suicidal ideation, access to mental health treatment, being a parent, compliant with medications, compliant with mental health treatment, connection to own children, resiliency, responsibilities and duties to others, stable living environment, sense of determination, strong relationships, supportive family  Weapons: none. The following steps have been taken to ensure weapons are properly secured: not applicable  Based on today's assessment, Gela presents the following risk of harm to self: low    Risk of Harm to Others:  The following ratings are based on assessment at the time of the interview  Protective Factors: no current homicidal ideation  Based on today's assessment, Gela presents the following risk of harm to others: none    The following interventions are recommended: no intervention changes needed. Although patient's acute lethality risk is LOW, long-term/chronic lethality risk is mildly elevated given chronic mental health symptoms.  Gela Bell is future-oriented, forward-thinking, and demonstrates ability to act in a self-preserving manner as evidenced by volitionally presenting to the clinic today, seeking treatment.  At this time, inpatient hospitalization is not currently warranted. To mitigate future risk, patient should adhere to treatment recommendations, avoid alcohol/illicit substance use, utilize community-based resources and familiar  support, and prioritize mental health treatment.      Based on today's assessment and clinical criteria, Gela Bell contracts for safety and is not an imminent risk of harm to self or others. Outpatient level of care is deemed appropriate at this present time.  Gela Bell understands that if they are no longer able to contract for safety, they need to call/contact the outpatient office including this writer, call/contact crisis and/or attend to the nearest Emergency Department for immediate evaluation.      The following interventions are recommended: contracts for safety at present - agrees to go to ED if feeling unsafe, referral for psychotherapy, return in 1 month for reassessment, contracts for safety at present - agrees to call Crisis Intervention Service if feeling unsafe    Medications Risks/Benefits:      Risks, Benefits And Possible Side Effects Of Medications:    Discussed risks and benefits of treatment with patient including risk of suicidality, serotonin syndrome, increased QTc interval and SIADH related to treatment with antidepressants; Risk of induction of manic symptoms in certain patient populations and risk of parkinsonian symptoms, metabolic syndrome, tardive dyskinesia and neuroleptic malignant syndrome related to treatment with antipsychotic medications     Controlled Medication Discussion:     Not applicable    Treatment Plan:    Due for update/Updated:   no  Last treatment plan done 11/22/23 by YOLANDA Rivera.  Treatment Plan due on 5/22/24.    YOLANDA Liriano 01/08/24    This note was shared with patient.

## 2024-01-09 ENCOUNTER — OFFICE VISIT (OUTPATIENT)
Dept: FAMILY MEDICINE CLINIC | Facility: CLINIC | Age: 34
End: 2024-01-09
Payer: COMMERCIAL

## 2024-01-09 VITALS
WEIGHT: 186 LBS | BODY MASS INDEX: 36.52 KG/M2 | SYSTOLIC BLOOD PRESSURE: 112 MMHG | HEART RATE: 93 BPM | DIASTOLIC BLOOD PRESSURE: 72 MMHG | RESPIRATION RATE: 18 BRPM | OXYGEN SATURATION: 98 % | TEMPERATURE: 98.3 F | HEIGHT: 60 IN

## 2024-01-09 DIAGNOSIS — G43.E01 CHRONIC MIGRAINE WITH AURA AND WITH STATUS MIGRAINOSUS, NOT INTRACTABLE: Primary | ICD-10-CM

## 2024-01-09 DIAGNOSIS — H66.001 NON-RECURRENT ACUTE SUPPURATIVE OTITIS MEDIA OF RIGHT EAR WITHOUT SPONTANEOUS RUPTURE OF TYMPANIC MEMBRANE: ICD-10-CM

## 2024-01-09 DIAGNOSIS — E55.9 VITAMIN D DEFICIENCY: ICD-10-CM

## 2024-01-09 DIAGNOSIS — J40 BRONCHITIS: ICD-10-CM

## 2024-01-09 LAB — 25(OH)D3 SERPL-MCNC: 33.7 NG/ML (ref 30–100)

## 2024-01-09 PROCEDURE — 99214 OFFICE O/P EST MOD 30 MIN: CPT | Performed by: NURSE PRACTITIONER

## 2024-01-09 PROCEDURE — 82306 VITAMIN D 25 HYDROXY: CPT | Performed by: NURSE PRACTITIONER

## 2024-01-09 PROCEDURE — T1015 CLINIC SERVICE: HCPCS | Performed by: NURSE PRACTITIONER

## 2024-01-09 PROCEDURE — 99213 OFFICE O/P EST LOW 20 MIN: CPT | Performed by: NURSE PRACTITIONER

## 2024-01-09 RX ORDER — AMOXICILLIN AND CLAVULANATE POTASSIUM 875; 125 MG/1; MG/1
1 TABLET, FILM COATED ORAL EVERY 12 HOURS SCHEDULED
Qty: 14 TABLET | Refills: 0 | Status: SHIPPED | OUTPATIENT
Start: 2024-01-09 | End: 2024-01-16

## 2024-01-09 RX ORDER — FLUTICASONE PROPIONATE AND SALMETEROL 100; 50 UG/1; UG/1
1 POWDER RESPIRATORY (INHALATION) 2 TIMES DAILY
Qty: 60 BLISTER | Refills: 1 | Status: SHIPPED | OUTPATIENT
Start: 2024-01-09

## 2024-01-09 RX ORDER — ALBUTEROL SULFATE 90 UG/1
2 AEROSOL, METERED RESPIRATORY (INHALATION) EVERY 6 HOURS PRN
Qty: 18 G | Refills: 2 | Status: SHIPPED | OUTPATIENT
Start: 2024-01-09

## 2024-01-09 RX ORDER — PROPRANOLOL HYDROCHLORIDE 120 MG/1
120 CAPSULE, EXTENDED RELEASE ORAL DAILY
Qty: 30 CAPSULE | Refills: 1 | Status: SHIPPED | OUTPATIENT
Start: 2024-01-09

## 2024-01-09 NOTE — PROGRESS NOTES
Assessment/Plan:     Diagnoses and all orders for this visit:    Chronic migraine with aura and with status migrainosus, not intractable  Comments:  Increase propranolol to 120 mg daily  RTC 1 month for evaluation of symptom control  Alarm findings reviewed  Orders:  -     propranolol (INDERAL LA) 120 mg 24 hr capsule; Take 1 capsule (120 mg total) by mouth daily    Bronchitis  Comments:  Does not tolerate steroids well  Start Advair and albuterol inhalers as ordered  RTC if no improvement in 3-5 days  Orders:  -     Fluticasone-Salmeterol (Advair Diskus) 100-50 mcg/dose inhaler; Inhale 1 puff 2 (two) times a day Rinse mouth after use.  -     albuterol (Ventolin HFA) 90 mcg/act inhaler; Inhale 2 puffs every 6 (six) hours as needed for wheezing or shortness of breath    Non-recurrent acute suppurative otitis media of right ear without spontaneous rupture of tympanic membrane  Comments:  Start augmentin BID  RTC if no improvement in 3-5 days  Alarm findings reviewed for RTC  Orders:  -     amoxicillin-clavulanate (AUGMENTIN) 875-125 mg per tablet; Take 1 tablet by mouth every 12 (twelve) hours for 7 days            Return in about 4 weeks (around 2/6/2024) for Recheck migraine.       Subjective:        Patient ID: Gela Bell is a 33 y.o. female.    Chief Complaint   Patient presents with    Migraine     Patient is here for a follow up for Migraines. Patient states she just has constant headaches. She states the propanolol helps, but feels like she needs something else also     Cough     Patient states she has a cough & congestion that started 12/22/23    Medication Refill     Patient needs a refill on her vitamin d and propanolol        [facility] anxiety, mood disorder, vitamin D deficiency, and chronic migraine presents for migraine follow up. Patient started on propranolol daily at last OV for migraine management. Reports improvement in migraine symptoms as well as reduced frequency to 2-3 per week. States the  headaches are not as bad as they previously were and resolve with half the dose of excedrine migraine she took previously.  Also endorses cough and nasal congestion for more than 3 weeks with right ear pain, shortness of breath, and wheezing.    Migraine  This is a chronic problem. The problem occurs intermittently. The problem has been gradually improving since onset. The pain is present in the frontal. The pain is at a severity of 7/10. Associated symptoms include coughing, dizziness, ear pain, photophobia, rhinorrhea, sinus pressure and a sore throat. Pertinent negatives include no abdominal pain, abnormal behavior, back pain, blurred vision, diarrhea, drainage, eye pain, eye redness, eye watering, facial sweating, fever, loss of balance, muscle aches, nausea, phonophobia, seizures, tingling or weight loss. Nothing aggravates the symptoms. Past treatments include beta blockers and Excedrin. The treatment provided moderate relief.   Cough  The current episode started 1 to 4 weeks ago. The problem has been rapidly worsening. The problem occurs constantly. The cough is Productive of sputum. Associated symptoms include ear congestion, ear pain, headaches, nasal congestion, postnasal drip, rhinorrhea, a sore throat, shortness of breath and wheezing. Pertinent negatives include no chest pain, chills, eye redness, fever, heartburn, hemoptysis, myalgias, rash, sweats or weight loss. The symptoms are aggravated by exercise. She has tried rest and OTC cough suppressant for the symptoms. The treatment provided mild relief.       The following portions of the patient's history were reviewed and updated as appropriate: allergies, current medications, past family history, past medical history, past social history, past surgical history and problem list.    Patient Active Problem List   Diagnosis    Closed displaced fracture of shaft of fifth metacarpal bone of left hand    Vitamin D deficiency    Anxiety    Mood disorder (HCC)     Psychophysiological insomnia    Chronic migraine with aura and with status migrainosus, not intractable    Other fatigue    MDD (major depressive disorder), recurrent episode, moderate (HCC)    DANIELA (generalized anxiety disorder)    Post traumatic stress disorder (PTSD)    Non-recurrent acute suppurative otitis media of right ear without spontaneous rupture of tympanic membrane    Bronchitis       Current Outpatient Medications   Medication Sig Dispense Refill    albuterol (Ventolin HFA) 90 mcg/act inhaler Inhale 2 puffs every 6 (six) hours as needed for wheezing or shortness of breath 18 g 2    amoxicillin-clavulanate (AUGMENTIN) 875-125 mg per tablet Take 1 tablet by mouth every 12 (twelve) hours for 7 days 14 tablet 0    Fluticasone-Salmeterol (Advair Diskus) 100-50 mcg/dose inhaler Inhale 1 puff 2 (two) times a day Rinse mouth after use. 60 blister 1    propranolol (INDERAL LA) 120 mg 24 hr capsule Take 1 capsule (120 mg total) by mouth daily 30 capsule 1    benzonatate (TESSALON) 200 MG capsule Take 1 capsule (200 mg total) by mouth 3 (three) times a day as needed for cough (Patient not taking: Reported on 1/3/2024) 20 capsule 0    brompheniramine-pseudoephedrine-DM 30-2-10 MG/5ML syrup Take 5 mL by mouth 4 (four) times a day as needed for congestion, cough or allergies 120 mL 0    ergocalciferol (VITAMIN D2) 50,000 units Take 1 capsule (50,000 Units total) by mouth once a week 13 capsule 1    escitalopram (LEXAPRO) 5 mg tablet Take 1 tablet (5 mg total) by mouth daily 90 tablet 0    fluticasone (VERAMYST) 27.5 MCG/SPRAY nasal spray 2 sprays into each nostril daily 9.1 mL 2    loratadine (CLARITIN) 10 mg tablet Take 1 tablet (10 mg total) by mouth daily 90 tablet 1    prazosin (MINIPRESS) 1 mg capsule Take 1 capsule (1 mg total) by mouth daily at bedtime 90 capsule 0    QUEtiapine (SEROquel) 50 mg tablet Take 1 tablet (50 mg total) by mouth daily at bedtime 90 tablet 0     No current facility-administered  medications for this visit.        Past Medical History:   Diagnosis Date    Allergic     Anxiety     Depression         Past Surgical History:   Procedure Laterality Date     SECTION N/A         Social History     Socioeconomic History    Marital status: Single     Spouse name: Not on file    Number of children: Not on file    Years of education: Not on file    Highest education level: Not on file   Occupational History    Not on file   Tobacco Use    Smoking status: Never    Smokeless tobacco: Never   Vaping Use    Vaping status: Never Used   Substance and Sexual Activity    Alcohol use: Not Currently     Comment: drinks very rarely 1 glass per occasion    Drug use: Never    Sexual activity: Yes     Partners: Male     Birth control/protection: I.U.D.   Other Topics Concern    Not on file   Social History Narrative    Not on file     Social Determinants of Health     Financial Resource Strain: Not on file   Food Insecurity: Not on file   Transportation Needs: Not on file   Physical Activity: Not on file   Stress: Not on file   Social Connections: Not on file   Intimate Partner Violence: Not on file   Housing Stability: Not on file      .  Review of Systems   Constitutional:  Positive for activity change and fatigue. Negative for appetite change, chills, fever and weight loss.   HENT:  Positive for congestion, ear pain, postnasal drip, rhinorrhea, sinus pressure, sinus pain, sneezing, sore throat and voice change.    Eyes:  Positive for photophobia. Negative for blurred vision, pain and redness.   Respiratory:  Positive for cough, shortness of breath and wheezing. Negative for hemoptysis.    Cardiovascular:  Negative for chest pain, palpitations and leg swelling.   Gastrointestinal:  Negative for abdominal pain, constipation, diarrhea, heartburn and nausea.   Genitourinary:  Negative for difficulty urinating, dysuria and hematuria.   Musculoskeletal:  Negative for arthralgias, back pain and myalgias.   Skin:   Negative for color change and rash.   Neurological:  Positive for dizziness and headaches. Negative for tingling, seizures and loss of balance.   Psychiatric/Behavioral:  Positive for sleep disturbance. Negative for self-injury and suicidal ideas.          Objective:      /72   Pulse 93   Temp 98.3 °F (36.8 °C)   Resp 18   Ht 5' (1.524 m)   Wt 84.4 kg (186 lb)   SpO2 98%   BMI 36.33 kg/m²          Physical Exam  Vitals and nursing note reviewed.   Constitutional:       Appearance: She is obese. She is ill-appearing.   HENT:      Right Ear: A middle ear effusion is present. Tympanic membrane is erythematous and bulging.      Left Ear: There is impacted cerumen.      Nose: Congestion present.      Right Turbinates: Swollen.      Left Turbinates: Swollen.      Right Sinus: Maxillary sinus tenderness and frontal sinus tenderness present.      Left Sinus: Maxillary sinus tenderness and frontal sinus tenderness present.      Mouth/Throat:      Mouth: Mucous membranes are moist.      Pharynx: Posterior oropharyngeal erythema present. No oropharyngeal exudate.   Eyes:      Conjunctiva/sclera: Conjunctivae normal.   Cardiovascular:      Rate and Rhythm: Normal rate and regular rhythm.   Pulmonary:      Effort: Pulmonary effort is normal.      Breath sounds: Examination of the right-upper field reveals wheezing. Examination of the left-upper field reveals wheezing. Wheezing present.   Abdominal:      General: Abdomen is flat. Bowel sounds are normal.      Palpations: Abdomen is soft.   Genitourinary:     Comments: Exam deferred  Skin:     General: Skin is warm and dry.      Capillary Refill: Capillary refill takes less than 2 seconds.   Neurological:      General: No focal deficit present.      Mental Status: She is alert and oriented to person, place, and time.   Psychiatric:         Mood and Affect: Mood normal.         Behavior: Behavior normal.

## 2024-01-10 DIAGNOSIS — E55.9 VITAMIN D DEFICIENCY: Primary | ICD-10-CM

## 2024-01-10 RX ORDER — MELATONIN
2000 DAILY
Qty: 180 TABLET | Refills: 1 | Status: SHIPPED | OUTPATIENT
Start: 2024-04-08

## 2024-01-11 ENCOUNTER — TELEPHONE (OUTPATIENT)
Dept: FAMILY MEDICINE CLINIC | Facility: CLINIC | Age: 34
End: 2024-01-11

## 2024-01-11 NOTE — TELEPHONE ENCOUNTER
----- Message from YOLANDA Henao sent at 1/10/2024 10:12 AM EST -----  Vit D level improving. Please, have patient continue high-dose weekly supplement x 3 more months. Will then transition over to daily 2000 IU vitamin D supplement.

## 2024-01-31 DIAGNOSIS — Z00.6 ENCOUNTER FOR EXAMINATION FOR NORMAL COMPARISON OR CONTROL IN CLINICAL RESEARCH PROGRAM: ICD-10-CM

## 2024-02-02 ENCOUNTER — OFFICE VISIT (OUTPATIENT)
Dept: FAMILY MEDICINE CLINIC | Facility: HOME HEALTHCARE | Age: 34
End: 2024-02-02
Payer: COMMERCIAL

## 2024-02-02 VITALS
SYSTOLIC BLOOD PRESSURE: 110 MMHG | WEIGHT: 183.8 LBS | RESPIRATION RATE: 18 BRPM | OXYGEN SATURATION: 97 % | DIASTOLIC BLOOD PRESSURE: 64 MMHG | BODY MASS INDEX: 36.08 KG/M2 | HEIGHT: 60 IN | HEART RATE: 68 BPM | TEMPERATURE: 97.9 F

## 2024-02-02 DIAGNOSIS — J06.9 VIRAL URI WITH COUGH: Primary | ICD-10-CM

## 2024-02-02 DIAGNOSIS — J30.9 ALLERGIC RHINITIS, UNSPECIFIED SEASONALITY, UNSPECIFIED TRIGGER: ICD-10-CM

## 2024-02-02 PROBLEM — J40 BRONCHITIS: Status: RESOLVED | Noted: 2024-01-09 | Resolved: 2024-02-02

## 2024-02-02 PROBLEM — H66.001 NON-RECURRENT ACUTE SUPPURATIVE OTITIS MEDIA OF RIGHT EAR WITHOUT SPONTANEOUS RUPTURE OF TYMPANIC MEMBRANE: Status: RESOLVED | Noted: 2024-01-09 | Resolved: 2024-02-02

## 2024-02-02 LAB
SARS-COV-2 AG UPPER RESP QL IA: NEGATIVE
VALID CONTROL: NORMAL

## 2024-02-02 PROCEDURE — T1015 CLINIC SERVICE: HCPCS | Performed by: FAMILY MEDICINE

## 2024-02-02 PROCEDURE — 99213 OFFICE O/P EST LOW 20 MIN: CPT | Performed by: PHYSICIAN ASSISTANT

## 2024-02-02 RX ORDER — METHYLPREDNISOLONE 4 MG/1
TABLET ORAL
Qty: 21 TABLET | Refills: 0 | Status: SHIPPED | OUTPATIENT
Start: 2024-02-02

## 2024-02-02 RX ORDER — LORATADINE 10 MG/1
10 TABLET ORAL DAILY
Qty: 90 TABLET | Refills: 1 | Status: SHIPPED | OUTPATIENT
Start: 2024-02-02

## 2024-02-02 NOTE — PROGRESS NOTES
Assessment/Plan:     Diagnoses and all orders for this visit:    Viral URI with cough  -     POCT Rapid Covid Ag  -     methylPREDNISolone 4 MG tablet therapy pack; Use as directed on package    Allergic rhinitis, unspecified seasonality, unspecified trigger  -     loratadine (CLARITIN) 10 mg tablet; Take 1 tablet (10 mg total) by mouth daily  -     fluticasone (VERAMYST) 27.5 MCG/SPRAY nasal spray; 2 sprays into each nostril daily        - COVID negative.  Will treat with medrol dosepak for middle ear effusion.  Restart Claritin and Flonase as prescribed previously for congestion/rhinorrhea.  Continue inhalers as prescribed.    Return if symptoms worsen or fail to improve.              Subjective:        Patient ID: Gela Bell is a 33 y.o. female.    Chief Complaint   Patient presents with   • Cough   • Fatigue   • Nasal Congestion   • Sore Throat   • Diarrhea       Gela is a 33 year old female presenting with concern for URI symptoms.    Patient reports URI symptoms on and off over the past 3 months.  She was treated for otitis media last month with Augmentin and started on Advair for bronchitis.  Over the past week, she endorses worsening congestion, rhinorrhea, sore throat, cough, and diarrhea.  Denies fever, ear pain, SOB, wheezing, or vomiting.  She has been taking mucinex along with her inhalers with minimal relief.  No known COVID contacts.  Daughter is sick with similar symptoms.        The following portions of the patient's history were reviewed and updated as appropriate: allergies, current medications, past family history, past medical history, past social history, past surgical history and problem list.    Patient Active Problem List   Diagnosis   • Closed displaced fracture of shaft of fifth metacarpal bone of left hand   • Vitamin D deficiency   • Anxiety   • Mood disorder (HCC)   • Psychophysiological insomnia   • Chronic migraine with aura and with status migrainosus, not intractable   •  Other fatigue   • MDD (major depressive disorder), recurrent episode, moderate (HCC)   • DANIELA (generalized anxiety disorder)   • Post traumatic stress disorder (PTSD)       Current Outpatient Medications   Medication Sig Dispense Refill   • albuterol (Ventolin HFA) 90 mcg/act inhaler Inhale 2 puffs every 6 (six) hours as needed for wheezing or shortness of breath 18 g 2   • [START ON 2024] cholecalciferol (VITAMIN D3) 1,000 units tablet Take 2 tablets (2,000 Units total) by mouth daily Do not start before 2024. 180 tablet 1   • ergocalciferol (VITAMIN D2) 50,000 units Take 1 capsule (50,000 Units total) by mouth once a week 13 capsule 1   • escitalopram (LEXAPRO) 5 mg tablet Take 1 tablet (5 mg total) by mouth daily 90 tablet 0   • fluticasone (VERAMYST) 27.5 MCG/SPRAY nasal spray 2 sprays into each nostril daily 9.1 mL 2   • Fluticasone-Salmeterol (Advair Diskus) 100-50 mcg/dose inhaler Inhale 1 puff 2 (two) times a day Rinse mouth after use. 60 blister 1   • loratadine (CLARITIN) 10 mg tablet Take 1 tablet (10 mg total) by mouth daily 90 tablet 1   • methylPREDNISolone 4 MG tablet therapy pack Use as directed on package 21 tablet 0   • prazosin (MINIPRESS) 1 mg capsule Take 1 capsule (1 mg total) by mouth daily at bedtime 90 capsule 0   • propranolol (INDERAL LA) 120 mg 24 hr capsule Take 1 capsule (120 mg total) by mouth daily 30 capsule 1   • QUEtiapine (SEROquel) 50 mg tablet Take 1 tablet (50 mg total) by mouth daily at bedtime 90 tablet 0     No current facility-administered medications for this visit.        Past Medical History:   Diagnosis Date   • Allergic    • Anxiety    • Depression         Past Surgical History:   Procedure Laterality Date   •  SECTION N/A         Social History     Socioeconomic History   • Marital status: Single     Spouse name: Not on file   • Number of children: Not on file   • Years of education: Not on file   • Highest education level: Not on file    Occupational History   • Not on file   Tobacco Use   • Smoking status: Never   • Smokeless tobacco: Never   Vaping Use   • Vaping status: Never Used   Substance and Sexual Activity   • Alcohol use: Not Currently     Comment: drinks very rarely 1 glass per occasion   • Drug use: Never   • Sexual activity: Yes     Partners: Male     Birth control/protection: I.U.D.   Other Topics Concern   • Not on file   Social History Narrative   • Not on file     Social Determinants of Health     Financial Resource Strain: Not on file   Food Insecurity: Not on file   Transportation Needs: Not on file   Physical Activity: Not on file   Stress: Not on file   Social Connections: Not on file   Intimate Partner Violence: Not on file   Housing Stability: Not on file        Review of Systems   Constitutional:  Positive for diaphoresis. Negative for chills and fever.   HENT:  Positive for congestion, rhinorrhea and sore throat. Negative for ear pain.    Respiratory:  Positive for cough. Negative for shortness of breath and wheezing.    Cardiovascular:  Negative for chest pain, palpitations and leg swelling.   Gastrointestinal:  Positive for diarrhea. Negative for abdominal pain, constipation, nausea and vomiting.   Musculoskeletal:  Positive for back pain and myalgias.   Skin:  Negative for rash and wound.   Neurological:  Positive for headaches. Negative for dizziness, syncope, weakness and light-headedness.         Objective:      /64 (BP Location: Left arm, Patient Position: Sitting, Cuff Size: Large)   Pulse 68   Temp 97.9 °F (36.6 °C)   Resp 18   Ht 5' (1.524 m)   Wt 83.4 kg (183 lb 12.8 oz)   SpO2 97%   BMI 35.90 kg/m²          Physical Exam  Vitals and nursing note reviewed.   Constitutional:       General: She is not in acute distress.     Appearance: Normal appearance. She is obese.   HENT:      Head: Normocephalic and atraumatic.      Right Ear: Ear canal and external ear normal. A middle ear effusion is present.       Left Ear: Tympanic membrane, ear canal and external ear normal.      Nose: Nose normal.      Mouth/Throat:      Mouth: Mucous membranes are moist.      Pharynx: Oropharynx is clear. Posterior oropharyngeal erythema present. No oropharyngeal exudate.   Eyes:      Extraocular Movements: Extraocular movements intact.      Conjunctiva/sclera: Conjunctivae normal.      Pupils: Pupils are equal, round, and reactive to light.   Cardiovascular:      Rate and Rhythm: Normal rate and regular rhythm.      Heart sounds: Normal heart sounds. No murmur heard.  Pulmonary:      Effort: Pulmonary effort is normal. No respiratory distress.      Breath sounds: Normal breath sounds. No wheezing.   Musculoskeletal:      Cervical back: Normal range of motion and neck supple.      Right lower leg: No edema.      Left lower leg: No edema.   Skin:     General: Skin is warm and dry.   Neurological:      General: No focal deficit present.      Mental Status: She is alert and oriented to person, place, and time.      Cranial Nerves: No cranial nerve deficit.      Motor: No weakness.   Psychiatric:         Mood and Affect: Mood normal.         Behavior: Behavior normal.

## 2024-02-06 ENCOUNTER — TELEMEDICINE (OUTPATIENT)
Dept: PSYCHIATRY | Facility: CLINIC | Age: 34
End: 2024-02-06

## 2024-02-06 DIAGNOSIS — F33.1 MDD (MAJOR DEPRESSIVE DISORDER), RECURRENT EPISODE, MODERATE (HCC): Primary | ICD-10-CM

## 2024-02-06 RX ORDER — ESCITALOPRAM OXALATE 10 MG/1
10 TABLET ORAL DAILY
Qty: 30 TABLET | Refills: 2 | Status: SHIPPED | OUTPATIENT
Start: 2024-02-06

## 2024-02-06 NOTE — PSYCH
Virtual Regular Visit    Verification of patient location:    Patient is located in the following state in which I hold an active license [place]    Problem List Items Addressed This Visit     MDD (major depressive disorder), recurrent episode, moderate (HCC) - Primary    Relevant Medications    escitalopram (Lexapro) 10 mg tablet         Depression Screening and Follow-up Plan: Patient's depression screening was positive with a PHQ-9 score of 10.       Encounter provider YOLANDA Liriano    Provider located at    [facility]  [address] [facility] [place] [address] [phone]    Recent Visits  No visits were found meeting these conditions.  Showing recent visits within past 7 days and meeting all other requirements  Today's Visits  Date Type Provider Dept   02/06/24 Telemedicine YOLANDA Liriano [facility]   Showing today's visits and meeting all other requirements  Future Appointments  No visits were found meeting these conditions.  Showing future appointments within next 150 days and meeting all other requirements           The patient was identified by name and date of birth. Patient was informed that this is a telemedicine visit and that the visit is being conducted throughthe Epic Embedded platform. She agrees to proceed..  My office door was closed. No one else was in the room.  She acknowledged consent and understanding of privacy and security of the video platform. The patient has agreed to participate and understands they can discontinue the visit at any time.    Patient is aware this is a billable service.     HPI     Current Outpatient Medications   Medication Sig Dispense Refill   • escitalopram (Lexapro) 10 mg tablet Take 1 tablet (10 mg total) by mouth daily 30 tablet 2   • albuterol (Ventolin HFA) 90 mcg/act inhaler Inhale 2 puffs every 6 (six) hours as needed for wheezing or shortness of breath 18 g 2   • [START ON  4/8/2024] cholecalciferol (VITAMIN D3) 1,000 units tablet Take 2 tablets (2,000 Units total) by mouth daily Do not start before April 8, 2024. 180 tablet 1   • ergocalciferol (VITAMIN D2) 50,000 units Take 1 capsule (50,000 Units total) by mouth once a week 13 capsule 1   • fluticasone (VERAMYST) 27.5 MCG/SPRAY nasal spray 2 sprays into each nostril daily 9.1 mL 2   • Fluticasone-Salmeterol (Advair Diskus) 100-50 mcg/dose inhaler Inhale 1 puff 2 (two) times a day Rinse mouth after use. 60 blister 1   • loratadine (CLARITIN) 10 mg tablet Take 1 tablet (10 mg total) by mouth daily 90 tablet 1   • methylPREDNISolone 4 MG tablet therapy pack Use as directed on package 21 tablet 0   • prazosin (MINIPRESS) 1 mg capsule Take 1 capsule (1 mg total) by mouth daily at bedtime 90 capsule 0   • propranolol (INDERAL LA) 120 mg 24 hr capsule Take 1 capsule (120 mg total) by mouth daily 30 capsule 1   • QUEtiapine (SEROquel) 50 mg tablet Take 1 tablet (50 mg total) by mouth daily at bedtime 90 tablet 0     No current facility-administered medications for this visit.       Review of Systems  Video Exam    There were no vitals filed for this visit.    Physical Exam   As a result of this visit, I have referred the patient for further respiratory evaluation. No    I spent 30 minutes directly with the patient during this visit  VIRTUAL VISIT DISCLAIMER    Gela Bell acknowledges that she has consented to an online visit or consultation. She understands that the online visit is based solely on information provided by her, and that, in the absence of a face-to-face physical evaluation by the physician, the diagnosis she receives is both limited and provisional in terms of accuracy and completeness. This is not intended to replace a full medical face-to-face evaluation by the physician. Gela Bell understands and accepts these terms.    MEDICATION MANAGEMENT NOTE        [facility] - [facility]  "[facility]    Name and Date of Birth:  Gela Bell 33 y.o. 1990 MRN: [id number]    Date of Visit: February 6, 2024    Allergies   Allergen Reactions   • Xanax [Alprazolam] Other (See Comments)     Patient reports history of addiction to xanax. Want to ensure she is not prescribed this medication again.       Visit Time    Visit Start Time: 1230  Visit Stop Time: 1300  Total Visit Duration:  30 minutes    SUBJECTIVE:    Gela is seen today for a follow up for Major Depressive Disorder and Generalized Anxiety Disorder. She continues to experience on and off symptoms since the last visit. Gela was seen virtually today for medication management follow up. She was last seen by this provider on 1/5/24.  She continues to fight illness and has been sick with a stomach bug now in addition to a URI.  She states that despite this, she is beginning to feel better both physically and mentally. She is stating that her mood \"isn't too bad\" and rates her depression a 5/10.  States that her anxiety is only a 3-4/10.  She finds it hard to fall asleep, but once asleep she is able to stay asleep and has trouble waking. She states that she never really feels rested. She gets between 8-10 hrs of sleep per night.  She does not need her SO Toby to get up and check the house for danger as often as she was needing him to, and states it is only about 1-2 times per week now.  She is calm and appropriate in conversation. She appears happy and brighter.  She denies SI/HI. Denies auditory and visual hallucinations. She is agreeable to increase her lexapro to help with the anxiety and depression she continues to feel and will hopefully help with her trouble falling asleep as well. She will continue her other medications as ordered. We will follow up in one month.     Current Rating Scores:     Current PHQ-9   PHQ-2/9 Depression Screening    Little interest or pleasure in doing things: 1 - several days  Feeling down, depressed, or " hopeless: 1 - several days  Trouble falling or staying asleep, or sleeping too much: 3 - nearly every day  Feeling tired or having little energy: 3 - nearly every day  Poor appetite or overeatin - several days  Feeling bad about yourself - or that you are a failure or have let yourself or your family down: 1 - several days  Trouble concentrating on things, such as reading the newspaper or watching television: 0 - not at all  Moving or speaking so slowly that other people could have noticed. Or the opposite - being so fidgety or restless that you have been moving around a lot more than usual: 0 - not at all  Thoughts that you would be better off dead, or of hurting yourself in some way: 0 - not at all  PHQ-9 Score: 10  PHQ-9 Interpretation: Moderate depression       Current DANIELA-7 is   DANIELA-7 Flowsheet Screening    Flowsheet Row Most Recent Value   Over the last 2 weeks, how often have you been bothered by any of the following problems?    Feeling nervous, anxious, or on edge 1   Not being able to stop or control worrying 1   Worrying too much about different things 0   Trouble relaxing 0   Being so restless that it is hard to sit still 2   Becoming easily annoyed or irritable 0   Feeling afraid as if something awful might happen 3   DANIELA-7 Total Score 7      .      She denies any side effects from current psychiatric medications.    PLAN:  Increase lexapro to 10mg PO Daily  Continue Prazosin 1mg PO HS   Continue Seroquel 50mg PO HS  Follow up in one month  She will call sooner with concerns or issues if they arise prior to scheduled appointment    Aware of 24 hour and weekend coverage for urgent situations accessed by calling [facility] main practice number  Referral for individual psychotherapy  Medication management every 1 month  Aware of need to follow up with family physician for medical issues    Diagnoses and all orders for this visit:    MDD (major depressive disorder), recurrent  episode, moderate (HCC)  -     escitalopram (Lexapro) 10 mg tablet; Take 1 tablet (10 mg total) by mouth daily      I  Current Outpatient Medications on File Prior to Visit   Medication Sig Dispense Refill   • albuterol (Ventolin HFA) 90 mcg/act inhaler Inhale 2 puffs every 6 (six) hours as needed for wheezing or shortness of breath 18 g 2   • [START ON 4/8/2024] cholecalciferol (VITAMIN D3) 1,000 units tablet Take 2 tablets (2,000 Units total) by mouth daily Do not start before April 8, 2024. 180 tablet 1   • ergocalciferol (VITAMIN D2) 50,000 units Take 1 capsule (50,000 Units total) by mouth once a week 13 capsule 1   • fluticasone (VERAMYST) 27.5 MCG/SPRAY nasal spray 2 sprays into each nostril daily 9.1 mL 2   • Fluticasone-Salmeterol (Advair Diskus) 100-50 mcg/dose inhaler Inhale 1 puff 2 (two) times a day Rinse mouth after use. 60 blister 1   • loratadine (CLARITIN) 10 mg tablet Take 1 tablet (10 mg total) by mouth daily 90 tablet 1   • methylPREDNISolone 4 MG tablet therapy pack Use as directed on package 21 tablet 0   • prazosin (MINIPRESS) 1 mg capsule Take 1 capsule (1 mg total) by mouth daily at bedtime 90 capsule 0   • propranolol (INDERAL LA) 120 mg 24 hr capsule Take 1 capsule (120 mg total) by mouth daily 30 capsule 1   • QUEtiapine (SEROquel) 50 mg tablet Take 1 tablet (50 mg total) by mouth daily at bedtime 90 tablet 0   • [DISCONTINUED] escitalopram (LEXAPRO) 5 mg tablet Take 1 tablet (5 mg total) by mouth daily 90 tablet 0     No current facility-administered medications on file prior to visit.       Psychotherapy Provided:     Individual psychotherapy provided: Yes  Counseling was provided during the session today for 16 minutes.  Supportive counseling provided.  Medication education provided to Gela.  Recent stressor including ongoing anxiety discussed with Gela.   Coping strategies reviewed with Gela.   Importance of medication and treatment compliance reviewed with  "Gela.  Importance of follow up with family physician for medical issues reviewed with Gela.  Reassurance and supportive therapy provided.   Crisis/safety plan discussed with Gela. Patient will call prior to scheduled appointment if they have any issues or concerns.  Patient understands they can access the office by calling the main number at any time if they are in crisis.  They also understand they can call their [facility]'s crisis number or go to their nearest ED if suicidal ideation increases or if they develop a plan or intent.     HPI ROS Appetite Changes and Sleep:     She reports  touble falling asleep, 8-10hrs/night , adequate appetite, low energy. Denies homicidal ideation, denies suicidal ideation    Review Of Systems:      HPI ROS:               Medication Side Effects: denies    Depression (10 worst): 5/10 \"Ok-lalita\"   Anxiety (10 worst): 3-4/10 No change   Safety concerns (SI, HI, etc): denies denies   Sleep: Trouble falling asleep, 8-10hrs/night Increased due to illness   Energy: fair low   Appetite: fair adequate      General decreased functioning and sleep disturbances   Personality no change in personality   Constitutional as noted in HPI   ENT negative   Cardiovascular negative   Respiratory as noted in HPI and congested and coughing   Gastrointestinal negative   Genitourinary negative   Musculoskeletal negative   Integumentary negative   Neurological negative   Endocrine negative   Other Symptoms none, all other systems are negative     Mental Status Evaluation:    Appearance Appropriately dressed and Good eye contact   Behavior calm and cooperative   Mood anxious, depressed, and improving  Depression Scale - 5 of 10 (0 = No depression)  Anxiety Scale -  3-4  of 10 (0 = No anxiety)   Speech Normal rate and volume   Affect appropriate and mood-congruent   Thought Processes Goal directed and coherent   Thought Content Does not verbalize delusional material   Associations Tightly connected "   Perceptual Disturbances Denies hallucinations and does not appear to be responding to internal stimuli   Risk Potential Suicidal/Homicidal Ideation - No evidence of suicidal or homicidal ideation and patient does not verbalize suicidal or homicidal ideation  Risk of Violence - No evidence of risk for violence found on assessment  Risk of Self Mutilation - No evidence of risk for self mutilation found on assessment   Orientation oriented to person, place, time/date, and situation   Memory recent and remote memory grossly intact   Consciousness alert and awake   Attention/Concentration attention span and concentration are age appropriate   Insight fair   Judgement fair   Muscle Strength and Gait normal muscle strength and normal muscle tone, normal gait/station and normal balance   Motor Activity no abnormal movements   Language no difficulty naming common objects, no difficulty repeating a phrase, no difficulty writing a sentence   Fund of Knowledge adequate knowledge of current events  adequate fund of knowledge regarding past history  adequate fund of knowledge regarding vocabulary      Past Psychiatric History Update:     Inpatient Psychiatric Admission Since Last Encounter:   no  Changes to Outpatient Psychiatric Treatment Team:    no  Suicide Attempt Or Self Mutilation Since Last Encounter:   no  Incidence of Violent Behavior Since Last Encounter:   no    Traumatic History Update:     New Onset of Abuse Since Last Encounter:   no  Traumatic Events Since Last Encounter:   no    Past Medical History:    Past Medical History:   Diagnosis Date   • Allergic    • Anxiety    • Depression         Past Surgical History:   Procedure Laterality Date   •  SECTION N/A      Allergies   Allergen Reactions   • Xanax [Alprazolam] Other (See Comments)     Patient reports history of addiction to xanax. Want to ensure she is not prescribed this medication again.     Substance Abuse History:    Social History     Substance  and Sexual Activity   Alcohol Use Not Currently    Comment: drinks very rarely 1 glass per occasion     Social History     Substance and Sexual Activity   Drug Use Never     Social History:    Social History     Socioeconomic History   • Marital status: Single     Spouse name: Not on file   • Number of children: Not on file   • Years of education: Not on file   • Highest education level: Not on file   Occupational History   • Not on file   Tobacco Use   • Smoking status: Never   • Smokeless tobacco: Never   Vaping Use   • Vaping status: Never Used   Substance and Sexual Activity   • Alcohol use: Not Currently     Comment: drinks very rarely 1 glass per occasion   • Drug use: Never   • Sexual activity: Yes     Partners: Male     Birth control/protection: I.U.D.   Other Topics Concern   • Not on file   Social History Narrative   • Not on file     Social Determinants of Health     Financial Resource Strain: Not on file   Food Insecurity: Not on file   Transportation Needs: Not on file   Physical Activity: Not on file   Stress: Not on file   Social Connections: Not on file   Intimate Partner Violence: Not on file   Housing Stability: Not on file     Family Psychiatric History:     Family History   Adopted: Yes   Problem Relation Age of Onset   • Drug abuse Mother    • Drug abuse Father      History Review:The following portions of the patient's history were reviewed and updated as appropriate: allergies, current medications, past family history, past medical history, past social history, past surgical history, and problem list     OBJECTIVE:     Vital signs in last 24 hours:    There were no vitals filed for this visit.  Laboratory Results: Recent Labs (last 2 months):   Office Visit on 02/02/2024   Component Date Value   • POCT SARS-CoV-2 Ag 02/02/2024 Negative    • VALID CONTROL 02/02/2024 Valid    Orders Only on 01/09/2024   Component Date Value   • Vit D, 25-Hydroxy 01/09/2024 33.7      I have personally reviewed  all pertinent laboratory/tests results.    Suicide/Homicide Risk Assessment:    Risk of Harm to Self:  The following ratings are based on assessment at the time of the interview  Recent Specific Risk Factors include: current depressive symptoms, current anxiety symptoms  Demographic risk factors include:   Historical Risk Factors include: chronic depressive symptoms, chronic anxiety symptoms, history of abuse, history of traumatic experiences, some paranoia  Protective Factors: no current suicidal ideation, access to mental health treatment, being a parent, compliant with medications, compliant with mental health treatment, connection to own children, resiliency, responsibilities and duties to others, stable living environment, sense of determination, strong relationships, supportive family  Weapons: none. The following steps have been taken to ensure weapons are properly secured: not applicable  Based on today's assessment, Gela presents the following risk of harm to self: low    Risk of Harm to Others:  The following ratings are based on assessment at the time of the interview  Protective Factors: no current homicidal ideation  Based on today's assessment, Gela presents the following risk of harm to others: none    The following interventions are recommended: no intervention changes needed. Although patient's acute lethality risk is LOW, long-term/chronic lethality risk is mildly elevated given chronic mental health symptoms.  Gela Bell is future-oriented, forward-thinking, and demonstrates ability to act in a self-preserving manner as evidenced by volitionally presenting to the clinic today, seeking treatment.  At this time, inpatient hospitalization is not currently warranted. To mitigate future risk, patient should adhere to treatment recommendations, avoid alcohol/illicit substance use, utilize community-based resources and familiar support, and prioritize mental health treatment.       Based on today's assessment and clinical criteria, Gela Bell contracts for safety and is not an imminent risk of harm to self or others. Outpatient level of care is deemed appropriate at this present time.  Gela Bell understands that if they are no longer able to contract for safety, they need to call/contact the outpatient office including this writer, call/contact crisis and/or attend to the nearest Emergency Department for immediate evaluation.      The following interventions are recommended: contracts for safety at present - agrees to go to ED if feeling unsafe, referral for psychotherapy, return in 1 month for reassessment, contracts for safety at present - agrees to call Crisis Intervention Service if feeling unsafe    Medications Risks/Benefits:      Risks, Benefits And Possible Side Effects Of Medications:    Discussed risks and benefits of treatment with patient including risk of suicidality, serotonin syndrome, increased QTc interval and SIADH related to treatment with antidepressants; Risk of induction of manic symptoms in certain patient populations and risk of parkinsonian symptoms, metabolic syndrome, tardive dyskinesia and neuroleptic malignant syndrome related to treatment with antipsychotic medications     Controlled Medication Discussion:     Not applicable    Treatment Plan:    Due for update/Updated:   no  Last treatment plan done 11/22/23 by YOLANDA Rivera.  Treatment Plan due on 5/22/24.    YOLANDA Liriano 02/06/24    This note was shared with patient.

## 2024-02-09 ENCOUNTER — OFFICE VISIT (OUTPATIENT)
Dept: FAMILY MEDICINE CLINIC | Facility: CLINIC | Age: 34
End: 2024-02-09
Payer: COMMERCIAL

## 2024-02-09 VITALS
WEIGHT: 180 LBS | DIASTOLIC BLOOD PRESSURE: 78 MMHG | SYSTOLIC BLOOD PRESSURE: 122 MMHG | OXYGEN SATURATION: 98 % | HEIGHT: 60 IN | TEMPERATURE: 97.6 F | BODY MASS INDEX: 35.34 KG/M2 | HEART RATE: 75 BPM

## 2024-02-09 DIAGNOSIS — E55.9 VITAMIN D DEFICIENCY: ICD-10-CM

## 2024-02-09 DIAGNOSIS — G43.E01 CHRONIC MIGRAINE WITH AURA AND WITH STATUS MIGRAINOSUS, NOT INTRACTABLE: ICD-10-CM

## 2024-02-09 DIAGNOSIS — G43.E01 CHRONIC MIGRAINE WITH AURA AND WITH STATUS MIGRAINOSUS, NOT INTRACTABLE: Primary | ICD-10-CM

## 2024-02-09 DIAGNOSIS — F41.9 ANXIETY: ICD-10-CM

## 2024-02-09 PROCEDURE — T1015 CLINIC SERVICE: HCPCS | Performed by: FAMILY MEDICINE

## 2024-02-09 PROCEDURE — 99213 OFFICE O/P EST LOW 20 MIN: CPT | Performed by: NURSE PRACTITIONER

## 2024-02-09 RX ORDER — PROPRANOLOL HYDROCHLORIDE 120 MG/1
120 CAPSULE, EXTENDED RELEASE ORAL DAILY
Qty: 90 CAPSULE | Refills: 2 | Status: SHIPPED | OUTPATIENT
Start: 2024-02-09

## 2024-02-09 RX ORDER — ERGOCALCIFEROL 1.25 MG/1
50000 CAPSULE ORAL WEEKLY
Qty: 13 CAPSULE | Refills: 1 | Status: SHIPPED | OUTPATIENT
Start: 2024-02-09

## 2024-02-09 NOTE — PROGRESS NOTES
Assessment/Plan:     Diagnoses and all orders for this visit:    Chronic migraine with aura and with status migrainosus, not intractable  Comments:  Controlled with current dose of propranolol at 120 mg  Continue same  RTC if any alarm S/s or increase in frequency occur  Orders:  -     propranolol (INDERAL LA) 120 mg 24 hr capsule; Take 1 capsule (120 mg total) by mouth daily    Chronic migraine with aura and with status migrainosus, not intractable  Comments:  Controlled with current dose of propranolol at 120 mg  Continue same  RTC if any alarm S/s or increase in frequency occur   .  Orders:  -     propranolol (INDERAL LA) 120 mg 24 hr capsule; Take 1 capsule (120 mg total) by mouth daily    Vitamin D deficiency  -     Cancel: Vitamin D 25 hydroxy; Future  -     Vitamin D 25 hydroxy; Future  -     ergocalciferol (VITAMIN D2) 50,000 units; Take 1 capsule (50,000 Units total) by mouth once a week    Anxiety  -     Cancel: CBC and differential; Future  -     Cancel: Comprehensive metabolic panel; Future  -     Cancel: TSH, 3rd generation with Free T4 reflex; Future  -     CBC and differential; Future  -     Comprehensive metabolic panel; Future  -     TSH, 3rd generation with Free T4 reflex; Future    Vitamin D deficiency  Comments:  Start weekly high-dose vitamin D  Recheck in 3 months  Orders:  -     Cancel: Vitamin D 25 hydroxy; Future  -     Vitamin D 25 hydroxy; Future  -     ergocalciferol (VITAMIN D2) 50,000 units; Take 1 capsule (50,000 Units total) by mouth once a week            Return in about 6 months (around 8/9/2024) for Annual physical.       Subjective:        Patient ID: Gela Bell is a 33 y.o. female.    Chief Complaint   Patient presents with    Follow-up    Migraine     Patient states migraines are improving    Cough     productive       PMH anxiety, mood disorder, vitamin D deficiency, and chronic migraine presents for migraine follow up. Patient's propranolol increased at last OV for  migraine management. Patient endorses excellent control of migraine symptoms since dose increase. She denies any current pain.  Patient endorses she has had a constant cough since November. She has just started medrol dose pack yesterday. Also states she has not received any of her vitamin d supplements as previously ordered/        The following portions of the patient's history were reviewed and updated as appropriate: allergies, current medications, past family history, past medical history, past social history, past surgical history and problem list.    Patient Active Problem List   Diagnosis    Closed displaced fracture of shaft of fifth metacarpal bone of left hand    Vitamin D deficiency    Anxiety    Mood disorder (HCC)    Psychophysiological insomnia    Chronic migraine with aura and with status migrainosus, not intractable    Other fatigue    MDD (major depressive disorder), recurrent episode, moderate ([facility])    DANIELA (generalized anxiety disorder)    Post traumatic stress disorder (PTSD)       Current Outpatient Medications   Medication Sig Dispense Refill    albuterol (Ventolin HFA) 90 mcg/act inhaler Inhale 2 puffs every 6 (six) hours as needed for wheezing or shortness of breath 18 g 2    ergocalciferol (VITAMIN D2) 50,000 units Take 1 capsule (50,000 Units total) by mouth once a week 13 capsule 1    escitalopram (Lexapro) 10 mg tablet Take 1 tablet (10 mg total) by mouth daily 30 tablet 2    fluticasone (VERAMYST) 27.5 MCG/SPRAY nasal spray 2 sprays into each nostril daily 9.1 mL 2    Fluticasone-Salmeterol (Advair Diskus) 100-50 mcg/dose inhaler Inhale 1 puff 2 (two) times a day Rinse mouth after use. 60 blister 1    loratadine (CLARITIN) 10 mg tablet Take 1 tablet (10 mg total) by mouth daily 90 tablet 1    methylPREDNISolone 4 MG tablet therapy pack Use as directed on package 21 tablet 0    prazosin (MINIPRESS) 1 mg capsule Take 1 capsule (1 mg total) by mouth daily at bedtime 90 capsule 0     propranolol (INDERAL LA) 120 mg 24 hr capsule Take 1 capsule (120 mg total) by mouth daily 90 capsule 2    QUEtiapine (SEROquel) 50 mg tablet Take 1 tablet (50 mg total) by mouth daily at bedtime 90 tablet 0    [START ON 2024] cholecalciferol (VITAMIN D3) 1,000 units tablet Take 2 tablets (2,000 Units total) by mouth daily Do not start before 2024. (Patient not taking: Reported on 2024 Do not start before 2024.) 180 tablet 1     No current facility-administered medications for this visit.        Past Medical History:   Diagnosis Date    Allergic     Anxiety     Depression         Past Surgical History:   Procedure Laterality Date     SECTION N/A         Social History     Socioeconomic History    Marital status: Single     Spouse name: Not on file    Number of children: Not on file    Years of education: Not on file    Highest education level: Not on file   Occupational History    Not on file   Tobacco Use    Smoking status: Never    Smokeless tobacco: Never   Vaping Use    Vaping status: Never Used   Substance and Sexual Activity    Alcohol use: Not Currently     Comment: drinks very rarely 1 glass per occasion    Drug use: Never    Sexual activity: Yes     Partners: Male     Birth control/protection: I.U.D.   Other Topics Concern    Not on file   Social History Narrative    Not on file     Social Determinants of Health     Financial Resource Strain: Not on file   Food Insecurity: Not on file   Transportation Needs: Not on file   Physical Activity: Not on file   Stress: Not on file   Social Connections: Not on file   Intimate Partner Violence: Not on file   Housing Stability: Not on file      .  Review of Systems   Constitutional:  Negative for activity change, appetite change and fatigue.   HENT:  Positive for congestion. Negative for ear pain, hearing loss, nosebleeds, rhinorrhea, sinus pressure, sinus pain, sore throat and trouble swallowing.    Respiratory:  Positive for cough.  Negative for shortness of breath.    Cardiovascular:  Negative for chest pain.   Gastrointestinal:  Negative for abdominal pain, blood in stool, constipation, diarrhea and nausea.   Genitourinary:  Negative for difficulty urinating, dysuria, frequency, hematuria and urgency.   Musculoskeletal:  Negative for arthralgias and myalgias.   Skin:  Negative for rash.   Neurological:  Negative for dizziness and headaches.   Psychiatric/Behavioral:  Negative for self-injury and suicidal ideas.          Objective:      /78 (BP Location: Left arm, Patient Position: Sitting, Cuff Size: Adult)   Pulse 75   Temp 97.6 °F (36.4 °C) (Tympanic)   Ht 5' (1.524 m)   Wt 81.6 kg (180 lb)   SpO2 98%   BMI 35.15 kg/m²          Physical Exam  Vitals and nursing note reviewed.   Constitutional:       Appearance: She is obese.   HENT:      Nose: Congestion present.      Mouth/Throat:      Mouth: Mucous membranes are moist.   Eyes:      Conjunctiva/sclera: Conjunctivae normal.   Cardiovascular:      Rate and Rhythm: Normal rate and regular rhythm.   Pulmonary:      Effort: Pulmonary effort is normal.      Breath sounds: Normal breath sounds.   Abdominal:      General: Abdomen is flat. Bowel sounds are normal.      Palpations: Abdomen is soft.   Genitourinary:     Comments: Exam deferred  Skin:     General: Skin is warm and dry.      Capillary Refill: Capillary refill takes less than 2 seconds.   Neurological:      General: No focal deficit present.      Mental Status: She is alert and oriented to person, place, and time.   Psychiatric:         Mood and Affect: Mood normal.         Behavior: Behavior normal.

## 2024-03-11 ENCOUNTER — APPOINTMENT (OUTPATIENT)
Dept: RADIOLOGY | Facility: MEDICAL CENTER | Age: 34
End: 2024-03-11
Payer: COMMERCIAL

## 2024-03-11 ENCOUNTER — APPOINTMENT (OUTPATIENT)
Dept: LAB | Facility: MEDICAL CENTER | Age: 34
End: 2024-03-11
Payer: COMMERCIAL

## 2024-03-11 ENCOUNTER — OFFICE VISIT (OUTPATIENT)
Dept: URGENT CARE | Facility: MEDICAL CENTER | Age: 34
End: 2024-03-11
Payer: COMMERCIAL

## 2024-03-11 VITALS
RESPIRATION RATE: 20 BRPM | HEART RATE: 83 BPM | BODY MASS INDEX: 36.72 KG/M2 | OXYGEN SATURATION: 97 % | TEMPERATURE: 98 F | WEIGHT: 188 LBS

## 2024-03-11 DIAGNOSIS — M25.562 ACUTE PAIN OF LEFT KNEE: ICD-10-CM

## 2024-03-11 DIAGNOSIS — S86.912A KNEE STRAIN, LEFT, INITIAL ENCOUNTER: Primary | ICD-10-CM

## 2024-03-11 DIAGNOSIS — Z00.6 ENCOUNTER FOR EXAMINATION FOR NORMAL COMPARISON OR CONTROL IN CLINICAL RESEARCH PROGRAM: ICD-10-CM

## 2024-03-11 PROCEDURE — 73564 X-RAY EXAM KNEE 4 OR MORE: CPT

## 2024-03-11 PROCEDURE — 99213 OFFICE O/P EST LOW 20 MIN: CPT

## 2024-03-11 PROCEDURE — 36415 COLL VENOUS BLD VENIPUNCTURE: CPT

## 2024-03-11 NOTE — PROGRESS NOTES
[facility]        NAME: Gela Bell is a 33 y.o. female  : 1990    MRN: [id number]  DATE: 2024  TIME: 1:38 PM    Assessment and Plan   Knee strain, left, initial encounter [S86.912A]  1. Knee strain, left, initial encounter  XR knee 4+ vw left injury    Ambulatory Referral to Orthopedic Surgery    Orthopedic injury treatment      Left Knee x-ray- No acute Fx or dislocation.         Patient Instructions   Tylenol/Ibuprofen for pain  Wear brace, splint or ACE wrap (Remove braces and ACE bandages every 3 hours)  Follow up with orthopedic if symptoms do not improve   Follow up with PCP in 3-5 days.  Proceed to  ER if symptoms worsen.    If tests are performed, our office will contact you with results only if changes need to made to the care plan discussed with you at the visit. [facility]    Chief Complaint     Chief Complaint   Patient presents with    Knee Pain     Left knee pain. Fell about 1 month ago. Hurts when she gets up or presses on the right spot. Fells like the knee cap moves. Not taking any medications for the pain. No bruising to t he area.          History of Present Illness       Knee Pain   Incident onset: left knee pain x 1 month. The incident occurred in the street. The injury mechanism was a fall. The pain is present in the left knee. The quality of the pain is described as aching. The pain is at a severity of 7/10. The pain is mild. The pain has been Intermittent since onset. Pertinent negatives include no numbness. The symptoms are aggravated by palpation. She has tried nothing for the symptoms. The treatment provided no relief.       Review of Systems   Review of Systems   Musculoskeletal:  Negative for arthralgias, gait problem and joint swelling.        Pain in left knee when it rains   Neurological:  Negative for weakness and numbness.         Current Medications       Current Outpatient Medications:     albuterol  (Ventolin HFA) 90 mcg/act inhaler, Inhale 2 puffs every 6 (six) hours as needed for wheezing or shortness of breath, Disp: 18 g, Rfl: 2    ergocalciferol (VITAMIN D2) 50,000 units, Take 1 capsule (50,000 Units total) by mouth once a week, Disp: 13 capsule, Rfl: 1    escitalopram (Lexapro) 10 mg tablet, Take 1 tablet (10 mg total) by mouth daily, Disp: 30 tablet, Rfl: 2    fluticasone (VERAMYST) 27.5 MCG/SPRAY nasal spray, 2 sprays into each nostril daily, Disp: 9.1 mL, Rfl: 2    Fluticasone-Salmeterol (Advair Diskus) 100-50 mcg/dose inhaler, Inhale 1 puff 2 (two) times a day Rinse mouth after use., Disp: 60 blister, Rfl: 1    loratadine (CLARITIN) 10 mg tablet, Take 1 tablet (10 mg total) by mouth daily, Disp: 90 tablet, Rfl: 1    prazosin (MINIPRESS) 1 mg capsule, Take 1 capsule (1 mg total) by mouth daily at bedtime, Disp: 90 capsule, Rfl: 0    propranolol (INDERAL LA) 120 mg 24 hr capsule, Take 1 capsule (120 mg total) by mouth daily, Disp: 90 capsule, Rfl: 2    QUEtiapine (SEROquel) 50 mg tablet, Take 1 tablet (50 mg total) by mouth daily at bedtime, Disp: 90 tablet, Rfl: 0    [START ON 4/8/2024] cholecalciferol (VITAMIN D3) 1,000 units tablet, Take 2 tablets (2,000 Units total) by mouth daily Do not start before April 8, 2024. (Patient not taking: Reported on 2/9/2024 Do not start before April 8, 2024.), Disp: 180 tablet, Rfl: 1    methylPREDNISolone 4 MG tablet therapy pack, Use as directed on package (Patient not taking: Reported on 3/11/2024), Disp: 21 tablet, Rfl: 0    Current Allergies     Allergies as of 03/11/2024 - Reviewed 03/11/2024   Allergen Reaction Noted    Xanax [alprazolam] Other (See Comments) 08/29/2023            The following portions of the patient's history were reviewed and updated as appropriate: allergies, current medications, past family history, past medical history, past social history, past surgical history and problem list.     Past Medical History:   Diagnosis Date    Allergic      Anxiety     Depression        Past Surgical History:   Procedure Laterality Date     SECTION N/A        Family History   Adopted: Yes   Problem Relation Age of Onset    Drug abuse Mother     Drug abuse Father          Medications have been verified.        Objective   Pulse 83   Temp 98 °F (36.7 °C)   Resp 20   Wt 85.3 kg (188 lb)   SpO2 97%   BMI 36.72 kg/m²        Physical Exam     Physical Exam  Vitals and nursing note reviewed.   Musculoskeletal:        Legs:       Comments: Left knee TTP. No visible ecchymosis or edema. No tenderness with extension or flexion or range of motion. Patient able to ambulate without difficulty. Kailey test negative         Orthopedic injury treatment    Date/Time: 3/11/2024 12:30 PM    Performed by: Angela Lombardo, CRNP  Authorized by: Angela Lombardo, CRNP  Universal Protocol:  Consent: Verbal consent obtained.  Risks and benefits: risks, benefits and alternatives were discussed  Consent given by: patient  Patient identity confirmed: verbally with patient    Injury location:  Knee  Location details:  Left knee  Injury type:  Soft tissue  Neurovascular status: Neurovascularly intact    Distal perfusion: normal    Neurological function: normal    Range of motion: normal    Immobilization:  Brace (hinged knee brace)  Neurovascular status: Neurovascularly intact    Distal perfusion: normal    Neurological function: normal    Range of motion: unchanged    Patient tolerance:  Patient tolerated the procedure well with no immediate complications

## 2024-03-11 NOTE — PATIENT INSTRUCTIONS
Patient Instructions   Tylenol/Ibuprofen for pain  Wear brace, splint or ACE wrap (Remove braces and ACE bandages every 3 hours)  Follow up with orthopedic if symptoms do not improve   Follow up with PCP in 3-5 days.  Proceed to  ER if symptoms worsen.    If tests are performed, our office will contact you with results only if changes need to made to the care plan discussed with you at the visit. You can review your full results on [facility].

## 2024-03-12 ENCOUNTER — TELEMEDICINE (OUTPATIENT)
Dept: PSYCHIATRY | Facility: CLINIC | Age: 34
End: 2024-03-12
Payer: COMMERCIAL

## 2024-03-12 DIAGNOSIS — F33.1 MDD (MAJOR DEPRESSIVE DISORDER), RECURRENT EPISODE, MODERATE (HCC): ICD-10-CM

## 2024-03-12 DIAGNOSIS — F43.10 POST TRAUMATIC STRESS DISORDER (PTSD): ICD-10-CM

## 2024-03-12 PROCEDURE — 99214 OFFICE O/P EST MOD 30 MIN: CPT | Performed by: NURSE PRACTITIONER

## 2024-03-12 RX ORDER — QUETIAPINE FUMARATE 50 MG/1
50 TABLET, FILM COATED ORAL
Qty: 90 TABLET | Refills: 0 | Status: SHIPPED | OUTPATIENT
Start: 2024-03-12

## 2024-03-12 RX ORDER — PRAZOSIN HYDROCHLORIDE 1 MG/1
1 CAPSULE ORAL
Qty: 90 CAPSULE | Refills: 0 | Status: SHIPPED | OUTPATIENT
Start: 2024-03-12

## 2024-03-13 NOTE — PSYCH
Virtual Regular Visit    Verification of patient location:    Patient is located in the following state in which I hold an active license [place]    Problem List Items Addressed This Visit     Post traumatic stress disorder (PTSD)    Relevant Medications    prazosin (MINIPRESS) 1 mg capsule    QUEtiapine (SEROquel) 50 mg tablet    MDD (major depressive disorder), recurrent episode, moderate (HCC)    Relevant Medications    QUEtiapine (SEROquel) 50 mg tablet          Encounter provider YOLANDA Liriano    Provider located at    [facility]  [address] [facility] [place] [address] [phone]    Recent Visits  Date Type Provider Dept   03/12/24 Telemedicine YOLANDA Liriano  Psychiatric [facility]   Showing recent visits within past 7 days and meeting all other requirements  Future Appointments  No visits were found meeting these conditions.  Showing future appointments within next 150 days and meeting all other requirements           The patient was identified by name and date of birth. Patient was informed that this is a telemedicine visit and that the visit is being conducted throughthe Epic Embedded platform. She agrees to proceed..  My office door was closed. Zoila Nichols, YOLANDA student, was also in the room with patient consent. She acknowledged consent and understanding of privacy and security of the video platform. The patient has agreed to participate and understands they can discontinue the visit at any time.    Patient is aware this is a billable service.     HPI     Current Outpatient Medications   Medication Sig Dispense Refill   • albuterol (Ventolin HFA) 90 mcg/act inhaler Inhale 2 puffs every 6 (six) hours as needed for wheezing or shortness of breath 18 g 2   • [START ON 4/8/2024] cholecalciferol (VITAMIN D3) 1,000 units tablet Take 2 tablets (2,000 Units total) by mouth daily Do not start before April 8, 2024. 180 tablet 1   •  ergocalciferol (VITAMIN D2) 50,000 units Take 1 capsule (50,000 Units total) by mouth once a week 13 capsule 1   • escitalopram (Lexapro) 10 mg tablet Take 1 tablet (10 mg total) by mouth daily 30 tablet 2   • fluticasone (VERAMYST) 27.5 MCG/SPRAY nasal spray 2 sprays into each nostril daily 9.1 mL 2   • Fluticasone-Salmeterol (Advair Diskus) 100-50 mcg/dose inhaler Inhale 1 puff 2 (two) times a day Rinse mouth after use. 60 blister 1   • loratadine (CLARITIN) 10 mg tablet Take 1 tablet (10 mg total) by mouth daily 90 tablet 1   • prazosin (MINIPRESS) 1 mg capsule Take 1 capsule (1 mg total) by mouth daily at bedtime 90 capsule 0   • propranolol (INDERAL LA) 120 mg 24 hr capsule Take 1 capsule (120 mg total) by mouth daily 90 capsule 2   • QUEtiapine (SEROquel) 50 mg tablet Take 1 tablet (50 mg total) by mouth daily at bedtime 90 tablet 0   • methylPREDNISolone 4 MG tablet therapy pack Use as directed on package (Patient not taking: Reported on 3/11/2024) 21 tablet 0     No current facility-administered medications for this visit.       Review of Systems  Video Exam    There were no vitals filed for this visit.    Physical Exam   As a result of this visit, I have referred the patient for further respiratory evaluation. No    I spent 20 minutes directly with the patient during this visit  VIRTUAL VISIT DISCLAIMER    Gela Bell acknowledges that she has consented to an online visit or consultation. She understands that the online visit is based solely on information provided by her, and that, in the absence of a face-to-face physical evaluation by the physician, the diagnosis she receives is both limited and provisional in terms of accuracy and completeness. This is not intended to replace a full medical face-to-face evaluation by the physician. Gela Bell understands and accepts these terms.    MEDICATION MANAGEMENT NOTE        [facility]    Name and Date of  "Birth:  Gela Bell 33 y.o. 1990 MRN: [id number]    Date of Visit: March 12, 2024    Allergies   Allergen Reactions   • Xanax [Alprazolam] Other (See Comments)     Patient reports history of addiction to xanax. Want to ensure she is not prescribed this medication again.       Visit Time    Visit Start Time: 1230  Visit Stop Time: 1250  Total Visit Duration:  20 minutes    SUBJECTIVE:    Gela is seen today for a follow up for Major Depressive Disorder and Generalized Anxiety Disorder. She continues to experience on and off symptoms since the last visit. Gela was seen virtually today for medication management follow up. She was last seen by this provider on 2/6/24.  Gela reports today that she is \"less sick\" but has been \"extremely tired the last 3 days\". She attributes this to daylight savings time, but also reports that she has been non-compliant with her seroquel and has not taken them for over a week.  She states that her depression is a 5/10, anxiety is a 5/10.  She denies SI/HI, denies auditory and visual hallucinations. She reports more anxiety at night, we discussed the need to be compliant and consistent with medications.  She verbalized understanding at this time. No medication changes made. She will follow up in one month.    Current Rating Scores:     She denies any side effects from current psychiatric medications.    PLAN:  Continue lexapro to 10mg PO Daily  Continue Prazosin 1mg PO HS   Continue Seroquel 50mg PO HS  Follow up in one month  She will call sooner with concerns or issues if they arise prior to scheduled appointment    Aware of 24 hour and weekend coverage for urgent situations accessed by calling [facility] main practice number  Referral for individual psychotherapy  Medication management every 1 month  Aware of need to follow up with family physician for medical issues    Diagnoses and all orders for this visit:    Post traumatic stress disorder " (PTSD)  -     prazosin (MINIPRESS) 1 mg capsule; Take 1 capsule (1 mg total) by mouth daily at bedtime    MDD (major depressive disorder), recurrent episode, moderate (HCC)  -     QUEtiapine (SEROquel) 50 mg tablet; Take 1 tablet (50 mg total) by mouth daily at bedtime      I  Current Outpatient Medications on File Prior to Visit   Medication Sig Dispense Refill   • albuterol (Ventolin HFA) 90 mcg/act inhaler Inhale 2 puffs every 6 (six) hours as needed for wheezing or shortness of breath 18 g 2   • [START ON 4/8/2024] cholecalciferol (VITAMIN D3) 1,000 units tablet Take 2 tablets (2,000 Units total) by mouth daily Do not start before April 8, 2024. 180 tablet 1   • ergocalciferol (VITAMIN D2) 50,000 units Take 1 capsule (50,000 Units total) by mouth once a week 13 capsule 1   • escitalopram (Lexapro) 10 mg tablet Take 1 tablet (10 mg total) by mouth daily 30 tablet 2   • fluticasone (VERAMYST) 27.5 MCG/SPRAY nasal spray 2 sprays into each nostril daily 9.1 mL 2   • Fluticasone-Salmeterol (Advair Diskus) 100-50 mcg/dose inhaler Inhale 1 puff 2 (two) times a day Rinse mouth after use. 60 blister 1   • loratadine (CLARITIN) 10 mg tablet Take 1 tablet (10 mg total) by mouth daily 90 tablet 1   • propranolol (INDERAL LA) 120 mg 24 hr capsule Take 1 capsule (120 mg total) by mouth daily 90 capsule 2   • methylPREDNISolone 4 MG tablet therapy pack Use as directed on package (Patient not taking: Reported on 3/11/2024) 21 tablet 0     No current facility-administered medications on file prior to visit.       Psychotherapy Provided:     Individual psychotherapy provided: Yes  Supportive counseling provided.  Medication education provided to Gela.  Recent stressor including ongoing anxiety discussed with Gela.   Coping strategies reviewed with Gela.   Importance of medication and treatment compliance reviewed with Gela.  Importance of follow up with family physician for medical issues reviewed with  Gela.  Reassurance and supportive therapy provided.   Crisis/safety plan discussed with Gela. Patient will call prior to scheduled appointment if they have any issues or concerns.  Patient understands they can access the office by calling the main number at any time if they are in crisis.  They also understand they can call their Critical access hospital's crisis number or go to their nearest ED if suicidal ideation increases or if they develop a plan or intent.     HPI ROS Appetite Changes and Sleep:     She reports  touble falling asleep, 8-10hrs/night , adequate appetite, low energy. Denies homicidal ideation, denies suicidal ideation    Review Of Systems:      HPI ROS:               Medication Side Effects:  denies   Depression (10 worst): 5/10 5/10   Anxiety (10 worst): 5/10 3-4/10   Safety concerns (SI, HI, etc): denies denies   Sleep: Difficulty with sleep recently, due to med noncompliance Trouble falling asleep, 8-10hrs/night   Energy: fair fair   Appetite: fair fair      General decreased functioning and sleep disturbances   Personality no change in personality   Constitutional as noted in HPI   ENT negative   Cardiovascular negative   Respiratory as noted in HPI and congested and coughing   Gastrointestinal negative   Genitourinary negative   Musculoskeletal negative   Integumentary negative   Neurological negative   Endocrine negative   Other Symptoms none, all other systems are negative     Mental Status Evaluation:    Appearance Appropriately dressed and Good eye contact   Behavior calm and cooperative   Mood anxious, depressed, and improving  Depression Scale - 5 of 10 (0 = No depression)  Anxiety Scale -  5  of 10 (0 = No anxiety)   Speech Normal rate and volume   Affect appropriate and mood-congruent   Thought Processes Goal directed and coherent   Thought Content Does not verbalize delusional material   Associations Tightly connected   Perceptual Disturbances Denies hallucinations and does not appear to be  responding to internal stimuli   Risk Potential Suicidal/Homicidal Ideation - No evidence of suicidal or homicidal ideation and patient does not verbalize suicidal or homicidal ideation  Risk of Violence - No evidence of risk for violence found on assessment  Risk of Self Mutilation - No evidence of risk for self mutilation found on assessment   Orientation oriented to person, place, time/date, and situation   Memory recent and remote memory grossly intact   Consciousness alert and awake   Attention/Concentration attention span and concentration are age appropriate   Insight fair   Judgement fair   Muscle Strength and Gait normal muscle strength and normal muscle tone, normal gait/station and normal balance   Motor Activity no abnormal movements   Language no difficulty naming common objects, no difficulty repeating a phrase, no difficulty writing a sentence   Fund of Knowledge adequate knowledge of current events  adequate fund of knowledge regarding past history  adequate fund of knowledge regarding vocabulary      Past Psychiatric History Update:     Inpatient Psychiatric Admission Since Last Encounter:   no  Changes to Outpatient Psychiatric Treatment Team:    no  Suicide Attempt Or Self Mutilation Since Last Encounter:   no  Incidence of Violent Behavior Since Last Encounter:   no    Traumatic History Update:     New Onset of Abuse Since Last Encounter:   no  Traumatic Events Since Last Encounter:   no    Past Medical History:    Past Medical History:   Diagnosis Date   • Allergic    • Anxiety    • Depression         Past Surgical History:   Procedure Laterality Date   •  SECTION N/A      Allergies   Allergen Reactions   • Xanax [Alprazolam] Other (See Comments)     Patient reports history of addiction to xanax. Want to ensure she is not prescribed this medication again.     Substance Abuse History:    Social History     Substance and Sexual Activity   Alcohol Use Not Currently    Comment: drinks very  rarely 1 glass per occasion     Social History     Substance and Sexual Activity   Drug Use Never     Social History:    Social History     Socioeconomic History   • Marital status: Single     Spouse name: Not on file   • Number of children: Not on file   • Years of education: Not on file   • Highest education level: Not on file   Occupational History   • Not on file   Tobacco Use   • Smoking status: Never   • Smokeless tobacco: Never   Vaping Use   • Vaping status: Never Used   Substance and Sexual Activity   • Alcohol use: Not Currently     Comment: drinks very rarely 1 glass per occasion   • Drug use: Never   • Sexual activity: Yes     Partners: Male     Birth control/protection: I.U.D.   Other Topics Concern   • Not on file   Social History Narrative   • Not on file     Social Determinants of Health     Financial Resource Strain: Not on file   Food Insecurity: Not on file   Transportation Needs: Not on file   Physical Activity: Not on file   Stress: Not on file   Social Connections: Not on file   Intimate Partner Violence: Not on file   Housing Stability: Not on file     Family Psychiatric History:     Family History   Adopted: Yes   Problem Relation Age of Onset   • Drug abuse Mother    • Drug abuse Father      History Review:The following portions of the patient's history were reviewed and updated as appropriate: allergies, current medications, past family history, past medical history, past social history, past surgical history, and problem list     OBJECTIVE:     Vital signs in last 24 hours:    There were no vitals filed for this visit.  Laboratory Results: Recent Labs (last 2 months):   Office Visit on 02/02/2024   Component Date Value   • POCT SARS-CoV-2 Ag 02/02/2024 Negative    • VALID CONTROL 02/02/2024 Valid      I have personally reviewed all pertinent laboratory/tests results.    Suicide/Homicide Risk Assessment:    Risk of Harm to Self:  The following ratings are based on assessment at the time of  the interview  Recent Specific Risk Factors include: current depressive symptoms, current anxiety symptoms  Demographic risk factors include:   Historical Risk Factors include: chronic depressive symptoms, chronic anxiety symptoms, history of abuse, history of traumatic experiences, some paranoia  Protective Factors: no current suicidal ideation, access to mental health treatment, being a parent, compliant with medications, compliant with mental health treatment, connection to own children, resiliency, responsibilities and duties to others, stable living environment, sense of determination, strong relationships, supportive family  Weapons: none. The following steps have been taken to ensure weapons are properly secured: not applicable  Based on today's assessment, Gela presents the following risk of harm to self: low    Risk of Harm to Others:  The following ratings are based on assessment at the time of the interview  Protective Factors: no current homicidal ideation  Based on today's assessment, Gela presents the following risk of harm to others: none    The following interventions are recommended: no intervention changes needed. Although patient's acute lethality risk is LOW, long-term/chronic lethality risk is mildly elevated given chronic mental health symptoms.  Gela Bell is future-oriented, forward-thinking, and demonstrates ability to act in a self-preserving manner as evidenced by volitionally presenting to the clinic today, seeking treatment.  At this time, inpatient hospitalization is not currently warranted. To mitigate future risk, patient should adhere to treatment recommendations, avoid alcohol/illicit substance use, utilize community-based resources and familiar support, and prioritize mental health treatment.      Based on today's assessment and clinical criteria, Gela Bell contracts for safety and is not an imminent risk of harm to self or others. Outpatient level of care  is deemed appropriate at this present time.  Gela Bell understands that if they are no longer able to contract for safety, they need to call/contact the outpatient office including this writer, call/contact crisis and/or attend to the nearest Emergency Department for immediate evaluation.      The following interventions are recommended: contracts for safety at present - agrees to go to ED if feeling unsafe, referral for psychotherapy, return in 1 month for reassessment, contracts for safety at present - agrees to call Crisis Intervention Service if feeling unsafe    Medications Risks/Benefits:      Risks, Benefits And Possible Side Effects Of Medications:    Discussed risks and benefits of treatment with patient including risk of suicidality, serotonin syndrome, increased QTc interval and SIADH related to treatment with antidepressants; Risk of induction of manic symptoms in certain patient populations and risk of parkinsonian symptoms, metabolic syndrome, tardive dyskinesia and neuroleptic malignant syndrome related to treatment with antipsychotic medications     Controlled Medication Discussion:     Not applicable    Treatment Plan:    Due for update/Updated:   no  Last treatment plan done 11/22/23 by YOLANDA Rivera.  Treatment Plan due on 5/22/24.    YOLANDA Liriano 03/13/24    This note was shared with patient.

## 2024-03-15 ENCOUNTER — OFFICE VISIT (OUTPATIENT)
Dept: OBGYN CLINIC | Facility: CLINIC | Age: 34
End: 2024-03-15
Payer: COMMERCIAL

## 2024-03-15 VITALS
DIASTOLIC BLOOD PRESSURE: 68 MMHG | SYSTOLIC BLOOD PRESSURE: 101 MMHG | BODY MASS INDEX: 36.91 KG/M2 | HEART RATE: 76 BPM | WEIGHT: 188 LBS | RESPIRATION RATE: 16 BRPM | HEIGHT: 60 IN

## 2024-03-15 DIAGNOSIS — S80.02XA CONTUSION OF LEFT KNEE, INITIAL ENCOUNTER: Primary | ICD-10-CM

## 2024-03-15 PROCEDURE — 99203 OFFICE O/P NEW LOW 30 MIN: CPT | Performed by: FAMILY MEDICINE

## 2024-03-15 NOTE — PROGRESS NOTES
Subjective:    Chief Complaint   Patient presents with    Left Knee - Pain     Patient fell recently about a month ago       Gela Bell is a 33 y.o. female complains of left knee pain. Onset of the symptoms was several weeks ago.  Mechanism of injury:  tripped over a curb and landed directly onto her left knee . Aggravating factors: direct pressure. Treatment to date: ice and rest. Symptoms have progressed to a point and plateaued.      The following portions were reviewed and updated as needed: allergies, current medications, past medical history, past social history, past surgical history and problem list.    Review of Systems   Constitutional: Negative for fever.   HENT: Negative for dental problem and headaches.    Eyes: Negative for vision loss.   Respiratory: Negative for cough and shortness of breath.    Cardiovascular: Negative for leg swelling and palpitations.   Gastrointestinal: Negative for constipation and diarrhea.   Genitourinary: Negative for bladder incontinence and difficulty urinating.   Musculoskeletal: Negative for back pain and difficulty walking.   Skin: Negative for rash and ulcer.   Neurological: Negative for dizziness and headaches.   Hem/Lymph/Immuno: Negative for blood clots. Does not bruise/bleed easily.   Psychiatric/Behavioral: Negative for confusion.         Objective:  General: no acute distress, non toxic, AAO x3   Skin: no skin changes, no rashes, no wounds or laceration  Vasculature: normal cap refill, no LE edema, normal popliteal and dorsalis pedis pulse  Neurologic:   Musculoskeletal: left KNEE EXAM  Gait: limping gait negative, able to weight bear without difficulty  Inspection: No gross deformity, no redness or warmth   Effusion: negative  +TTP patelar tendon   Medial joint line TTP: negative  Lateral joint line TTP: negative  ROM: Full flexion and extension  Arnie's: negative,   Instability to varus/valgus stress: negative  Anterior Drawer: negative   Lachman's test:  negative  Posterior Drawer: negative          Imaging:       Assessment/Plan:  1. Contusion of left knee, initial encounter  Clinical impression is that patient has suffered from contusion of the left knee.  She does not demonstrate any laxity with ligamentous evaluation and no reproduction of pain with meniscus testing on examination.  Symptoms have gradually improved.  Advised to continue icing and resting.  He will return in about 1 month if no improvement.  - Ambulatory Referral to Orthopedic Surgery

## 2024-03-28 ENCOUNTER — OFFICE VISIT (OUTPATIENT)
Dept: URGENT CARE | Facility: MEDICAL CENTER | Age: 34
End: 2024-03-28
Payer: COMMERCIAL

## 2024-03-28 VITALS
TEMPERATURE: 98.9 F | HEART RATE: 70 BPM | HEIGHT: 60 IN | BODY MASS INDEX: 36.32 KG/M2 | DIASTOLIC BLOOD PRESSURE: 72 MMHG | OXYGEN SATURATION: 98 % | SYSTOLIC BLOOD PRESSURE: 116 MMHG | RESPIRATION RATE: 18 BRPM | WEIGHT: 185 LBS

## 2024-03-28 DIAGNOSIS — R53.83 OTHER FATIGUE: Primary | ICD-10-CM

## 2024-03-28 DIAGNOSIS — G43.809 OTHER MIGRAINE WITHOUT STATUS MIGRAINOSUS, NOT INTRACTABLE: ICD-10-CM

## 2024-03-28 LAB
APOB+LDLR+PCSK9 GENE MUT ANL BLD/T: NOT DETECTED
BRCA1+BRCA2 DEL+DUP + FULL MUT ANL BLD/T: NOT DETECTED
MLH1+MSH2+MSH6+PMS2 GN DEL+DUP+FUL M: NOT DETECTED

## 2024-03-28 PROCEDURE — 99212 OFFICE O/P EST SF 10 MIN: CPT | Performed by: PHYSICIAN ASSISTANT

## 2024-03-28 NOTE — PROGRESS NOTES
[facility]        NAME: Gela Bell is a 33 y.o. female  : 1990    MRN: [id number]  DATE: 2024  TIME: 6:20 PM    Assessment and Plan   Other fatigue [R53.83]  1. Other fatigue        2. Other migraine without status migrainosus, not intractable              Patient Instructions     Follow up with PCP for work up for fatigue and migraines    Follow up with PCP in 3-5 days.  Proceed to  ER if symptoms worsen.    If tests have been performed at [facility], our office will contact you with results if changes need to be made to the care plan discussed with you at the visit.  You can review your full results on [facility].    Chief Complaint     Chief Complaint   Patient presents with   • Migraine     Migraines on and off for 6 years    • Fatigue     Fatigue that started in November , poor appetite then wants to over eat     • Cold Like Symptoms     Nasal congestion x a couple of weeks          History of Present Illness       Patient presents with increased migraines. She was diagnosed with migraines 6 yrs ago and take Propanolol daily. When a migraine occurs she take Excedrin. Lately she has been having one a week which are associated with nausea and light sensitivity. She also has fatigue for a while. Sleeps 8-12 hrs a night and can still sleep more. For the past few weeks she has been having a runny, stuffy nose. Taking Claritin with improvement. Patient states she had Covid several times and believes she has long Covid. Patient advised she will need to follow up with PCP to make sure there is no other diagnosis which may account for her symptoms. She may require blood work or other tests which we do not perform through urgent care.        Review of Systems   Review of Systems   Constitutional:  Positive for fatigue.   HENT:  Positive for congestion, rhinorrhea and sore throat.    Respiratory:  Negative for cough.    Neurological:  Positive for headaches (migraine once/week).          Current Medications       Current Outpatient Medications:   •  albuterol (Ventolin HFA) 90 mcg/act inhaler, Inhale 2 puffs every 6 (six) hours as needed for wheezing or shortness of breath, Disp: 18 g, Rfl: 2  •  [START ON 4/8/2024] cholecalciferol (VITAMIN D3) 1,000 units tablet, Take 2 tablets (2,000 Units total) by mouth daily Do not start before April 8, 2024., Disp: 180 tablet, Rfl: 1  •  escitalopram (Lexapro) 10 mg tablet, Take 1 tablet (10 mg total) by mouth daily, Disp: 30 tablet, Rfl: 2  •  Fluticasone-Salmeterol (Advair Diskus) 100-50 mcg/dose inhaler, Inhale 1 puff 2 (two) times a day Rinse mouth after use., Disp: 60 blister, Rfl: 1  •  loratadine (CLARITIN) 10 mg tablet, Take 1 tablet (10 mg total) by mouth daily, Disp: 90 tablet, Rfl: 1  •  prazosin (MINIPRESS) 1 mg capsule, Take 1 capsule (1 mg total) by mouth daily at bedtime, Disp: 90 capsule, Rfl: 0  •  propranolol (INDERAL LA) 120 mg 24 hr capsule, Take 1 capsule (120 mg total) by mouth daily, Disp: 90 capsule, Rfl: 2  •  QUEtiapine (SEROquel) 50 mg tablet, Take 1 tablet (50 mg total) by mouth daily at bedtime, Disp: 90 tablet, Rfl: 0  •  ergocalciferol (VITAMIN D2) 50,000 units, Take 1 capsule (50,000 Units total) by mouth once a week (Patient not taking: Reported on 3/28/2024), Disp: 13 capsule, Rfl: 1  •  fluticasone (VERAMYST) 27.5 MCG/SPRAY nasal spray, 2 sprays into each nostril daily (Patient not taking: Reported on 3/28/2024), Disp: 9.1 mL, Rfl: 2  •  methylPREDNISolone 4 MG tablet therapy pack, Use as directed on package (Patient not taking: Reported on 3/11/2024), Disp: 21 tablet, Rfl: 0    Current Allergies     Allergies as of 03/28/2024 - Reviewed 03/28/2024   Allergen Reaction Noted   • Xanax [alprazolam] Other (See Comments) 08/29/2023            The following portions of the patient's history were reviewed and updated as appropriate: allergies, current medications, past family history, past medical history, past social  history, past surgical history and problem list.     Past Medical History:   Diagnosis Date   • Allergic    • Anxiety    • Depression        Past Surgical History:   Procedure Laterality Date   •  SECTION N/A        Family History   Adopted: Yes   Problem Relation Age of Onset   • Drug abuse Mother    • Drug abuse Father          Medications have been verified.        Objective   /72   Pulse 70   Temp 98.9 °F (37.2 °C)   Resp 18   Ht 5' (1.524 m)   Wt 83.9 kg (185 lb)   SpO2 98%   BMI 36.13 kg/m²   No LMP recorded. Patient has had an implant.       Physical Exam     Physical Exam  Vitals and nursing note reviewed.   Constitutional:       Appearance: Normal appearance.   HENT:      Head: Normocephalic and atraumatic.      Right Ear: Tympanic membrane normal.      Left Ear: Tympanic membrane normal.      Mouth/Throat:      Mouth: Mucous membranes are moist.      Pharynx: Oropharynx is clear.   Cardiovascular:      Rate and Rhythm: Normal rate and regular rhythm.      Heart sounds: Normal heart sounds.   Pulmonary:      Effort: Pulmonary effort is normal.      Breath sounds: Normal breath sounds.   Musculoskeletal:      Cervical back: Neck supple.   Lymphadenopathy:      Cervical: No cervical adenopathy.   Skin:     General: Skin is warm.   Neurological:      Mental Status: She is alert.

## 2024-05-07 ENCOUNTER — TELEMEDICINE (OUTPATIENT)
Dept: PSYCHIATRY | Facility: CLINIC | Age: 34
End: 2024-05-07
Payer: COMMERCIAL

## 2024-05-07 DIAGNOSIS — F33.1 MDD (MAJOR DEPRESSIVE DISORDER), RECURRENT EPISODE, MODERATE (HCC): Primary | ICD-10-CM

## 2024-05-07 DIAGNOSIS — G47.09 OTHER INSOMNIA: ICD-10-CM

## 2024-05-07 PROCEDURE — 99214 OFFICE O/P EST MOD 30 MIN: CPT | Performed by: NURSE PRACTITIONER

## 2024-05-07 RX ORDER — ESCITALOPRAM OXALATE 10 MG/1
15 TABLET ORAL DAILY
Qty: 135 TABLET | Refills: 0 | Status: SHIPPED | OUTPATIENT
Start: 2024-05-07

## 2024-05-07 NOTE — BH CRISIS PLAN
"Client Name: Gela Bell       Client YOB: 1990    [place] Safety Plan    Creation Date: 5/7/24 Update Date: 5/7/24   Created By: YOLANDA Liriano Last Updated By: YOLANDA Liriano      Step 1: Warning Signs:   Warning Signs   anxiety   decreased sleep   more paranoia            Step 2: Internal Coping Strategies:   Internal Coping Strategies   reading   finding a place to feel safe            Step 3: People and social settings that provide distraction:   Name Contact Information   with my \"other half\" or with my daughter in phone          Step 4: People whom I can ask for help during a crisis:    Name Contact Information    My best friends in phone    boyfriend in phone/in apartment    MIL in phone      Step 5: Professionals or agencies I can contact during a crisis:    Clinican/Agency Name Phone Emergency Contact    [facility]  Radha GUERRERO      Crisis Phone Numbers:   Suicide Prevention Lifeline: Call or Text  988 Crisis Text Line: Text HOME to 566-585   Please note: Some [facility] do not have a separate number for Child/Adolescent specific crisis. If your county is not listed under Child/Adolescent, please call the adult number for your county      Adult Crisis Numbers: Child/Adolescent Crisis Numbers   [facility]: [phone] [facility]: [phone]   [facility]: [phone] [facility]: [phone]   [facility]: [phone] [place]: [phone]   [facility]: [phone] [place]: [phone]   [facility]: [phone]   [facility]: [phone]   [facility]: [phone]   [facility]: [phone] (daytime) [phone] (after hours, weekends, holidays)      Step 6: Making the environment safer (plan for lethal means safety):   Patient did not identify any lethal methods: Yes     Optional: What is most important to me and worth living for?   \"My " "family\".     [place] Safety Plan. Crystal Hernandez and Rayray Ivy. Used with permission of the authors.           "

## 2024-05-07 NOTE — PSYCH
Virtual Regular Visit    Verification of patient location:    Patient is located in the following state in which I hold an active license [place]    Problem List Items Addressed This Visit     MDD (major depressive disorder), recurrent episode, moderate (HCC) - Primary    Relevant Medications    escitalopram (Lexapro) 10 mg tablet   Other Visit Diagnoses     Other insomnia        Relevant Orders    Ambulatory Referral to Sleep Medicine             Encounter provider YOLANDA Liriano    Provider located at    [facility]  [address] [facility] [place] [address] [phone]    Recent Visits  No visits were found meeting these conditions.  Showing recent visits within past 7 days and meeting all other requirements  Today's Visits  Date Type Provider Dept   05/07/24 Telemedicine YOLANDA Liriano [facility]   Showing today's visits and meeting all other requirements  Future Appointments  No visits were found meeting these conditions.  Showing future appointments within next 150 days and meeting all other requirements           The patient was identified by name and date of birth. Patient was informed that this is a telemedicine visit and that the visit is being conducted throughthe Epic Embedded platform. She agrees to proceed..  My office door was closed. Hunter Pendleton, BSN student, was also in the room with patient consent. She acknowledged consent and understanding of privacy and security of the video platform. The patient has agreed to participate and understands they can discontinue the visit at any time.    Patient is aware this is a billable service.     HPI     Current Outpatient Medications   Medication Sig Dispense Refill   • albuterol (Ventolin HFA) 90 mcg/act inhaler Inhale 2 puffs every 6 (six) hours as needed for wheezing or shortness of breath 18 g 2   • cholecalciferol (VITAMIN D3) 1,000 units tablet Take 2 tablets (2,000  Units total) by mouth daily Do not start before April 8, 2024. 180 tablet 1   • ergocalciferol (VITAMIN D2) 50,000 units Take 1 capsule (50,000 Units total) by mouth once a week 13 capsule 1   • escitalopram (Lexapro) 10 mg tablet Take 1.5 tablets (15 mg total) by mouth daily 135 tablet 0   • Fluticasone-Salmeterol (Advair Diskus) 100-50 mcg/dose inhaler Inhale 1 puff 2 (two) times a day Rinse mouth after use. 60 blister 1   • loratadine (CLARITIN) 10 mg tablet Take 1 tablet (10 mg total) by mouth daily 90 tablet 1   • prazosin (MINIPRESS) 1 mg capsule Take 1 capsule (1 mg total) by mouth daily at bedtime 90 capsule 0   • propranolol (INDERAL LA) 120 mg 24 hr capsule Take 1 capsule (120 mg total) by mouth daily 90 capsule 2   • QUEtiapine (SEROquel) 50 mg tablet Take 1 tablet (50 mg total) by mouth daily at bedtime 90 tablet 0   • fluticasone (VERAMYST) 27.5 MCG/SPRAY nasal spray 2 sprays into each nostril daily (Patient not taking: Reported on 3/28/2024) 9.1 mL 2   • methylPREDNISolone 4 MG tablet therapy pack Use as directed on package (Patient not taking: Reported on 3/11/2024) 21 tablet 0     No current facility-administered medications for this visit.       Review of Systems  Video Exam    There were no vitals filed for this visit.    Physical Exam   As a result of this visit, I have referred the patient for further respiratory evaluation. No    I spent 30 minutes directly with the patient during this visit  VIRTUAL VISIT DISCLAIMER    Gela Bell acknowledges that she has consented to an online visit or consultation. She understands that the online visit is based solely on information provided by her, and that, in the absence of a face-to-face physical evaluation by the physician, the diagnosis she receives is both limited and provisional in terms of accuracy and completeness. This is not intended to replace a full medical face-to-face evaluation by the physician. Gela Bell understands and accepts  "these terms.    MEDICATION MANAGEMENT NOTE        [facility]    Name and Date of Birth:  Gela Bell 33 y.o. 1990 MRN: [id number]    Date of Visit: May 7, 2024    Allergies   Allergen Reactions   • Xanax [Alprazolam] Other (See Comments)     Patient reports history of addiction to xanax. Want to ensure she is not prescribed this medication again.       Visit Time    Visit Start Time: 1200  Visit Stop Time: 1230  Total Visit Duration:  30 minutes    SUBJECTIVE:    Gela is seen today for a follow up for Major Depressive Disorder and Generalized Anxiety Disorder. She continues to experience on and off symptoms since the last visit. Gela was seen virtually today for medication management follow up. She was last seen by this provider on 3/12/24.  Gela states that she is \"sick again\" and has what she believes is an ear infection and chest infection but has not been to the doctor yet. She states she had a \"bad mental breakdown\" 2 weeks ago and was \"crying and everything was wrong\". She states that she was out of her lexapro at the time and when she started taking it again, she felt better.  She reports today that she has had some anhedonia.  She has some sleep issues where she has trouble falling asleep, then she also has trouble waking up.  She denies SI/HI. Denies auditory and visual hallucinations. She is calm, holding a doll in her lap, stating it is her \"emotional support demon\".  She feels supported by her friends, boyfriend, and mother in law.  She will increase her lexapro to 15mg PO daily and we will input a referral for sleep medicine.  She is agreeable with this at this time. Continue all other medications and follow up in 6 weeks.    Current Rating Scores:     She denies any side effects from current psychiatric medications.    PLAN:  Increase lexapro to 15mg PO Daily  Continue Prazosin 1mg PO HS   Continue Seroquel 50mg PO HS  Referral " for sleep medicine  Follow up in one month  She will call sooner with concerns or issues if they arise prior to scheduled appointment    Aware of 24 hour and weekend coverage for urgent situations accessed by calling [facility] main practice number  Referral for individual psychotherapy  Medication management every 6 weeks  Aware of need to follow up with family physician for medical issues    Diagnoses and all orders for this visit:    MDD (major depressive disorder), recurrent episode, moderate (HCC)  -     escitalopram (Lexapro) 10 mg tablet; Take 1.5 tablets (15 mg total) by mouth daily    Other insomnia  -     Ambulatory Referral to Sleep Medicine      I  Current Outpatient Medications on File Prior to Visit   Medication Sig Dispense Refill   • albuterol (Ventolin HFA) 90 mcg/act inhaler Inhale 2 puffs every 6 (six) hours as needed for wheezing or shortness of breath 18 g 2   • cholecalciferol (VITAMIN D3) 1,000 units tablet Take 2 tablets (2,000 Units total) by mouth daily Do not start before April 8, 2024. 180 tablet 1   • ergocalciferol (VITAMIN D2) 50,000 units Take 1 capsule (50,000 Units total) by mouth once a week 13 capsule 1   • Fluticasone-Salmeterol (Advair Diskus) 100-50 mcg/dose inhaler Inhale 1 puff 2 (two) times a day Rinse mouth after use. 60 blister 1   • loratadine (CLARITIN) 10 mg tablet Take 1 tablet (10 mg total) by mouth daily 90 tablet 1   • prazosin (MINIPRESS) 1 mg capsule Take 1 capsule (1 mg total) by mouth daily at bedtime 90 capsule 0   • propranolol (INDERAL LA) 120 mg 24 hr capsule Take 1 capsule (120 mg total) by mouth daily 90 capsule 2   • QUEtiapine (SEROquel) 50 mg tablet Take 1 tablet (50 mg total) by mouth daily at bedtime 90 tablet 0   • [DISCONTINUED] escitalopram (Lexapro) 10 mg tablet Take 1 tablet (10 mg total) by mouth daily 30 tablet 2   • fluticasone (VERAMYST) 27.5 MCG/SPRAY nasal spray 2 sprays into each nostril daily (Patient not taking:  Reported on 3/28/2024) 9.1 mL 2   • methylPREDNISolone 4 MG tablet therapy pack Use as directed on package (Patient not taking: Reported on 3/11/2024) 21 tablet 0     No current facility-administered medications on file prior to visit.       Psychotherapy Provided:     Individual psychotherapy provided: Yes  Supportive counseling provided.  Medication education provided to Gela.  Recent stressor including ongoing anxiety discussed with Gela.   Coping strategies reviewed with Gela.   Importance of medication and treatment compliance reviewed with Gela.  Importance of follow up with family physician for medical issues reviewed with Gela.  Reassurance and supportive therapy provided.   Crisis/safety plan discussed with Gela. Patient will call prior to scheduled appointment if they have any issues or concerns.  Patient understands they can access the office by calling the main number at any time if they are in crisis.  They also understand they can call their county's crisis number or go to their nearest ED if suicidal ideation increases or if they develop a plan or intent.     HPI ROS Appetite Changes and Sleep:     She reports  touble falling asleep, trouble waking up , adequate appetite, low energy. Denies homicidal ideation, denies suicidal ideation    Review Of Systems:      HPI ROS:               Medication Side Effects: denies    Depression (10 worst): Increased anhedonia 5/10   Anxiety (10 worst): increased 5/10   Safety concerns (SI, HI, etc): denies denies   Sleep: Difficulty falling asleep, difficulty waking up Difficulty with sleep recently, due to med noncompliance   Energy: low fair   Appetite: good fair      General decreased functioning and sleep disturbances   Personality no change in personality   Constitutional as noted in HPI   ENT negative   Cardiovascular negative   Respiratory as noted in HPI and congested and coughing   Gastrointestinal negative   Genitourinary negative    Musculoskeletal negative   Integumentary negative   Neurological negative   Endocrine negative   Other Symptoms none, all other systems are negative     Mental Status Evaluation:    Appearance Appropriately dressed and Good eye contact, holding a doll in her lap   Behavior calm and cooperative   Mood anxious and depressed  Depression Scale -  increased  of 10 (0 = No depression)  Anxiety Scale -  increased  of 10 (0 = No anxiety)   Speech Normal rate and volume   Affect appropriate and mood-congruent   Thought Processes Goal directed and coherent   Thought Content Does not verbalize delusional material   Associations Tightly connected   Perceptual Disturbances Denies hallucinations and does not appear to be responding to internal stimuli   Risk Potential Suicidal/Homicidal Ideation - No evidence of suicidal or homicidal ideation and patient does not verbalize suicidal or homicidal ideation  Risk of Violence - No evidence of risk for violence found on assessment  Risk of Self Mutilation - No evidence of risk for self mutilation found on assessment   Orientation oriented to person, place, time/date, and situation   Memory recent and remote memory grossly intact   Consciousness alert and awake   Attention/Concentration attention span and concentration are age appropriate   Insight fair   Judgement fair   Muscle Strength and Gait normal muscle strength and normal muscle tone, normal gait/station and normal balance   Motor Activity no abnormal movements   Language no difficulty naming common objects, no difficulty repeating a phrase, no difficulty writing a sentence   Fund of Knowledge adequate knowledge of current events  adequate fund of knowledge regarding past history  adequate fund of knowledge regarding vocabulary      Past Psychiatric History Update:     Inpatient Psychiatric Admission Since Last Encounter:   no  Changes to Outpatient Psychiatric Treatment Team:    no  Suicide Attempt Or Self Mutilation Since  Last Encounter:   no  Incidence of Violent Behavior Since Last Encounter:   no    Traumatic History Update:     New Onset of Abuse Since Last Encounter:   no  Traumatic Events Since Last Encounter:   no    Past Medical History:    Past Medical History:   Diagnosis Date   • Allergic    • Anxiety    • Depression         Past Surgical History:   Procedure Laterality Date   •  SECTION N/A      Allergies   Allergen Reactions   • Xanax [Alprazolam] Other (See Comments)     Patient reports history of addiction to xanax. Want to ensure she is not prescribed this medication again.     Substance Abuse History:    Social History     Substance and Sexual Activity   Alcohol Use Not Currently    Comment: drinks very rarely 1 glass per occasion     Social History     Substance and Sexual Activity   Drug Use Never     Social History:    Social History     Socioeconomic History   • Marital status: Single     Spouse name: Not on file   • Number of children: Not on file   • Years of education: Not on file   • Highest education level: Not on file   Occupational History   • Not on file   Tobacco Use   • Smoking status: Never   • Smokeless tobacco: Never   Vaping Use   • Vaping status: Never Used   Substance and Sexual Activity   • Alcohol use: Not Currently     Comment: drinks very rarely 1 glass per occasion   • Drug use: Never   • Sexual activity: Yes     Partners: Male     Birth control/protection: I.U.D.   Other Topics Concern   • Not on file   Social History Narrative   • Not on file     Social Determinants of Health     Financial Resource Strain: Not on file   Food Insecurity: Not on file   Transportation Needs: Not on file   Physical Activity: Not on file   Stress: Not on file   Social Connections: Not on file   Intimate Partner Violence: Not on file   Housing Stability: Not on file     Family Psychiatric History:     Family History   Adopted: Yes   Problem Relation Age of Onset   • Drug abuse Mother    • Drug abuse  Father      History Review:The following portions of the patient's history were reviewed and updated as appropriate: allergies, current medications, past family history, past medical history, past social history, past surgical history, and problem list     OBJECTIVE:     Vital signs in last 24 hours:    There were no vitals filed for this visit.  Laboratory Results: Recent Labs (last 2 months):   Appointment on 03/11/2024   Component Date Value   • LEPE SYNDROME DNA LIBBY* 03/11/2024 Not Detected    • HEREDITARY BREAST & OVAR* 03/11/2024 Not Detected    • FAMILIAL HYPERCHOLESTERO* 03/11/2024 Not Detected      I have personally reviewed all pertinent laboratory/tests results.    Suicide/Homicide Risk Assessment:    Risk of Harm to Self:  The following ratings are based on assessment at the time of the interview  Recent Specific Risk Factors include: current depressive symptoms, current anxiety symptoms  Demographic risk factors include:   Historical Risk Factors include: chronic depressive symptoms, chronic anxiety symptoms, history of abuse, history of traumatic experiences, some paranoia  Protective Factors: no current suicidal ideation, access to mental health treatment, being a parent, compliant with medications, compliant with mental health treatment, connection to own children, resiliency, responsibilities and duties to others, stable living environment, sense of determination, strong relationships, supportive family  Weapons: none. The following steps have been taken to ensure weapons are properly secured: not applicable  Based on today's assessmentGela presents the following risk of harm to self: low    Risk of Harm to Others:  The following ratings are based on assessment at the time of the interview  Protective Factors: no current homicidal ideation  Based on today's assessmentGela presents the following risk of harm to others: none    The following interventions are recommended: no  intervention changes needed. Although patient's acute lethality risk is LOW, long-term/chronic lethality risk is mildly elevated given chronic mental health symptoms.  Gela Bell is future-oriented, forward-thinking, and demonstrates ability to act in a self-preserving manner as evidenced by volitionally presenting to the clinic today, seeking treatment.  At this time, inpatient hospitalization is not currently warranted. To mitigate future risk, patient should adhere to treatment recommendations, avoid alcohol/illicit substance use, utilize community-based resources and familiar support, and prioritize mental health treatment.      Based on today's assessment and clinical criteria, Gela Bell contracts for safety and is not an imminent risk of harm to self or others. Outpatient level of care is deemed appropriate at this present time.  Gela Bell understands that if they are no longer able to contract for safety, they need to call/contact the outpatient office including this writer, call/contact crisis and/or attend to the nearest Emergency Department for immediate evaluation.      The following interventions are recommended: contracts for safety at present - agrees to go to ED if feeling unsafe, referral for psychotherapy, return in 6 weeks for reassessment, contracts for safety at present - agrees to call Crisis Intervention Service if feeling unsafe    Medications Risks/Benefits:      Risks, Benefits And Possible Side Effects Of Medications:    Discussed risks and benefits of treatment with patient including risk of suicidality, serotonin syndrome, increased QTc interval and SIADH related to treatment with antidepressants; Risk of induction of manic symptoms in certain patient populations, risk of parkinsonian symptoms, metabolic syndrome, tardive dyskinesia and neuroleptic malignant syndrome related to treatment with antipsychotic medications, and risks of cardiovascular side effects including  elevated blood pressure, risk of misuse, abuse or dependence and risk of increased anxiety related to treatment with stimulant medications     Controlled Medication Discussion:     Not applicable    Treatment Plan:    Due for update/Updated:   no  Last treatment plan done 5/7/24 by YOLANDA Rivera.  Treatment Plan due on 11/7/24.    YOLANDA Liriano 05/07/24    This note was not shared with the patient due to reasonable likelihood of causing patient harm

## 2024-05-07 NOTE — BH TREATMENT PLAN
TREATMENT PLAN (Medication Management Only)        [facility]    Name and Date of Birth:  Gela Bell 33 y.o. 1990  Date of Treatment Plan: May 7, 2024  Diagnosis/Diagnoses:    1. MDD (major depressive disorder), recurrent episode, moderate (HCC)    2. Other insomnia      Strengths/Personal Resources for Self-Care: supportive family.  Area/Areas of need (in own words): depressive symptoms  1. Long Term Goal: continue improvement in depression.  Target Date:6 months - 11/7/2024  Person/Persons responsible for completion of goal: Gela  2.  Short Term Objective (s) - How will we reach this goal?:   A. Provider new recommended medication/dosage changes and/or continue medication(s): Medication changes: I have increased Gela Bell's escitalopram. Additionally, I am having her maintain her prazosin, and QUEtiapine..  B. Eat a healthy diet .  C. Think positively.  Target Date:6 months - 11/7/2024  Person/Persons Responsible for Completion of Goal: Gela  Progress Towards Goals: continuing treatment, progressing  Treatment Modality: medication management every 1 month, medication education at every visit  Review due 180 days from date of this plan: 6 months - 11/7/2024  Expected length of service: ongoing treatment  My Physician/PA/NP and I have developed this plan together and I agree to work on the goals and objectives. I understand the treatment goals that were developed for my treatment.

## 2024-05-10 ENCOUNTER — TELEPHONE (OUTPATIENT)
Dept: PSYCHIATRY | Facility: CLINIC | Age: 34
End: 2024-05-10

## 2024-06-18 ENCOUNTER — TELEMEDICINE (OUTPATIENT)
Dept: PSYCHIATRY | Facility: CLINIC | Age: 34
End: 2024-06-18

## 2024-06-18 DIAGNOSIS — F33.1 MDD (MAJOR DEPRESSIVE DISORDER), RECURRENT EPISODE, MODERATE (HCC): ICD-10-CM

## 2024-06-18 DIAGNOSIS — F43.10 POST TRAUMATIC STRESS DISORDER (PTSD): ICD-10-CM

## 2024-06-18 RX ORDER — QUETIAPINE FUMARATE 50 MG/1
50 TABLET, FILM COATED ORAL
Qty: 90 TABLET | Refills: 0 | Status: SHIPPED | OUTPATIENT
Start: 2024-06-18

## 2024-06-18 RX ORDER — PRAZOSIN HYDROCHLORIDE 1 MG/1
1 CAPSULE ORAL
Qty: 90 CAPSULE | Refills: 0 | Status: SHIPPED | OUTPATIENT
Start: 2024-06-18

## 2024-06-18 NOTE — PSYCH
Virtual Regular Visit    Verification of patient location:    Patient is located at Home in the following state in which I hold an active license [place]      Assessment/Plan:    Problem List Items Addressed This Visit     Post traumatic stress disorder (PTSD)    Relevant Medications    QUEtiapine (SEROquel) 50 mg tablet    prazosin (MINIPRESS) 1 mg capsule    MDD (major depressive disorder), recurrent episode, moderate (HCC)    Relevant Medications    QUEtiapine (SEROquel) 50 mg tablet          Reason for visit is   Chief Complaint   Patient presents with   • Virtual Regular Visit          Encounter provider YOLANDA Liriano      Recent Visits  No visits were found meeting these conditions.  Showing recent visits within past 7 days and meeting all other requirements  Today's Visits  Date Type Provider Dept   24 Telemedicine YOLANDA Liriano  [facility]   Showing today's visits and meeting all other requirements  Future Appointments  No visits were found meeting these conditions.  Showing future appointments within next 150 days and meeting all other requirements       The patient was identified by name and date of birth. Gela Bell was informed that this is a telemedicine visit and that the visit is being conducted throughthe Epic Embedded platform. She agrees to proceed..  My office door was closed. The patient was notified the following individuals were present in the room YOLANDA Kyle student.  She acknowledged consent and understanding of privacy and security of the video platform. The patient has agreed to participate and understands they can discontinue the visit at any time.    Patient is aware this is a billable service.     HPI     Past Medical History:   Diagnosis Date   • Allergic    • Anxiety    • Depression        Past Surgical History:   Procedure Laterality Date   •  SECTION N/A        Current Outpatient Medications   Medication Sig Dispense Refill    • albuterol (Ventolin HFA) 90 mcg/act inhaler Inhale 2 puffs every 6 (six) hours as needed for wheezing or shortness of breath 18 g 2   • cholecalciferol (VITAMIN D3) 1,000 units tablet Take 2 tablets (2,000 Units total) by mouth daily Do not start before April 8, 2024. 180 tablet 1   • ergocalciferol (VITAMIN D2) 50,000 units Take 1 capsule (50,000 Units total) by mouth once a week 13 capsule 1   • escitalopram (Lexapro) 10 mg tablet Take 1.5 tablets (15 mg total) by mouth daily 135 tablet 0   • Fluticasone-Salmeterol (Advair Diskus) 100-50 mcg/dose inhaler Inhale 1 puff 2 (two) times a day Rinse mouth after use. 60 blister 1   • loratadine (CLARITIN) 10 mg tablet Take 1 tablet (10 mg total) by mouth daily 90 tablet 1   • methylPREDNISolone 4 MG tablet therapy pack Use as directed on package 21 tablet 0   • prazosin (MINIPRESS) 1 mg capsule Take 1 capsule (1 mg total) by mouth daily at bedtime 90 capsule 0   • propranolol (INDERAL LA) 120 mg 24 hr capsule Take 1 capsule (120 mg total) by mouth daily 90 capsule 2   • QUEtiapine (SEROquel) 50 mg tablet Take 1 tablet (50 mg total) by mouth daily at bedtime 90 tablet 0   • fluticasone (VERAMYST) 27.5 MCG/SPRAY nasal spray 2 sprays into each nostril daily (Patient not taking: Reported on 3/28/2024) 9.1 mL 2     No current facility-administered medications for this visit.        Allergies   Allergen Reactions   • Xanax [Alprazolam] Other (See Comments)     Patient reports history of addiction to xanax. Want to ensure she is not prescribed this medication again.       Review of Systems    Video Exam    There were no vitals filed for this visit.    Physical Exam     Visit Time    Visit Start Time: 1300  Visit Stop Time: 1318  Total Visit Duration:  18 minutes      MEDICATION MANAGEMENT NOTE        [facility]    Name and Date of Birth:  Gela Bell 33 y.o. 1990 MRN: [id number]    Date of Visit: June 18,  2024    SUBJECTIVE:    Gela is seen today for a follow up for Major Depressive Disorder, PTSD, and Generalized Anxiety Disorder. She continues to do fairly well since the last visit. Gela seen virtually for medication management follow up. She was last seen by this provider on 5/7/24.  Gela reports today that she did not sleep last night.  She states that she did not take her Seroquel due to the pharmacy not filling it correctly.  She otherwise states that she has no real problems staying asleep, instead she has problems sleeping too much.  She was reminded that she has a referral for sleep med consultation in the system.  She reports low depression, rates her anxiety 3-5 out of 10.  States it is worse at night.  She denies suicidal or homicidal ideation, denies any auditory or visual hallucinations.  She appears distracted in conversation, but otherwise appropriate. Will continue medications as ordered and follow up in 6 weeks.    She denies any side effects from current psychiatric medications.    PLAN:  Lexapro 15mg PO daily  Seroquel 50mg PO HS  Prazosin 1mg PO HS  Follow up in 6 weeks  She will call sooner with concerns or issues if they arise prior to scheduled appt    Aware of 24 hour and weekend coverage for urgent situations accessed by calling [facility] main practice number  Medication management every 6 weeks  Aware of need to follow up with family physician for medical issues    Diagnoses and all orders for this visit:    MDD (major depressive disorder), recurrent episode, moderate (HCC)  -     QUEtiapine (SEROquel) 50 mg tablet; Take 1 tablet (50 mg total) by mouth daily at bedtime    Post traumatic stress disorder (PTSD)  -     prazosin (MINIPRESS) 1 mg capsule; Take 1 capsule (1 mg total) by mouth daily at bedtime      Psychotherapy Provided:     Individual psychotherapy provided: Yes  Supportive counseling provided.  Medication changes discussed with  "Gela.  Medication education provided to Gela.  Discussed with Gela coping with chronic anxiety.   Coping strategies reviewed with Gela.   Importance of medication and treatment compliance reviewed with Gela.  Importance of follow up with family physician for medical issues reviewed with Gela.  Reassurance and supportive therapy provided.   Crisis/safety plan discussed with Gela. Patient will call prior to scheduled appointment if they have any issues or concerns.  Patient understands they can access the office by calling the main number at any time if they are in crisis.  They also understand they can call their [facility] crisis number or go to their nearest ED if suicidal ideation increases or if they develop a plan or intent.       HPI ROS Appetite Changes and Sleep:     She reports  increased sleep , normal appetite, normal energy level. Denies homicidal ideation, denies suicidal ideation    Review Of Systems:      HPI ROS:               Medication Side Effects:  denies   Depression (10 worst): \"Low\"   Anxiety (10 worst): 3-5/10   Safety concerns (SI, HI, etc): denies   Sleep: Increased sleep   Energy: adequate   Appetite: adequate     General normal    Personality no change in personality   Constitutional as noted in HPI   ENT negative   Cardiovascular negative   Respiratory negative   Gastrointestinal negative   Genitourinary negative   Musculoskeletal negative   Integumentary negative   Neurological negative   Endocrine negative   Other Symptoms none, all other systems are negative     Mental Status Evaluation:    Appearance Appropriately dressed and Good eye contact   Behavior calm and cooperative   Mood anxious  Depression Scale -  \"low\"  of 10 (0 = No depression)  Anxiety Scale -  3-5  of 10 (0 = No anxiety)   Speech Normal rate and volume   Affect appropriate and mood-congruent   Thought Processes Circumstantial   Thought Content Does not verbalize delusional material "   Associations Tightly connected   Perceptual Disturbances Denies hallucinations and does not appear to be responding to internal stimuli   Risk Potential Suicidal/Homicidal Ideation - No evidence of suicidal or homicidal ideation and patient does not verbalize suicidal or homicidal ideation  Risk of Violence - No evidence of risk for violence found on assessment  Risk of Self Mutilation - No evidence of risk for self mutilation found on assessment   Orientation oriented to person, place, time/date, and situation   Memory recent and remote memory grossly intact   Consciousness alert and awake   Attention/Concentration attention span and concentration are age appropriate   Insight poor   Judgement poor   Muscle Strength and Gait normal muscle strength and normal muscle tone, normal gait/station and normal balance   Motor Activity no abnormal movements   Language no difficulty naming common objects, no difficulty repeating a phrase, no difficulty writing a sentence   Fund of Knowledge adequate knowledge of current events  adequate fund of knowledge regarding past history  adequate fund of knowledge regarding vocabulary      Past Psychiatric History Update:     Inpatient Psychiatric Admission Since Last Encounter:   no  Changes to Outpatient Psychiatric Treatment Team:    no  Suicide Attempt Or Self Mutilation Since Last Encounter:   no  Incidence of Violent Behavior Since Last Encounter:   no    Traumatic History Update:     New Onset of Abuse Since Last Encounter:   no  Traumatic Events Since Last Encounter:   no    Substance Abuse History:    Social History     Substance and Sexual Activity   Alcohol Use Not Currently    Comment: drinks very rarely 1 glass per occasion     Social History     Substance and Sexual Activity   Drug Use Never     Social History:    Social History     Socioeconomic History   • Marital status: Single     Spouse name: Not on file   • Number of children: Not on file   • Years of education:  Not on file   • Highest education level: Not on file   Occupational History   • Not on file   Tobacco Use   • Smoking status: Never   • Smokeless tobacco: Never   Vaping Use   • Vaping status: Never Used   Substance and Sexual Activity   • Alcohol use: Not Currently     Comment: drinks very rarely 1 glass per occasion   • Drug use: Never   • Sexual activity: Yes     Partners: Male     Birth control/protection: I.U.D.   Other Topics Concern   • Not on file   Social History Narrative   • Not on file     Social Determinants of Health     Financial Resource Strain: Not on file   Food Insecurity: Not on file   Transportation Needs: Not on file   Physical Activity: Not on file   Stress: Not on file   Social Connections: Not on file   Intimate Partner Violence: Not on file   Housing Stability: Not on file     Family Psychiatric History:     Family History   Adopted: Yes   Problem Relation Age of Onset   • Drug abuse Mother    • Drug abuse Father      History Review:The following portions of the patient's history were reviewed and updated as appropriate: allergies, current medications, past family history, past medical history, past social history, past surgical history, and problem list     OBJECTIVE:     Vital signs in last 24 hours:    There were no vitals filed for this visit.  Laboratory Results: I have personally reviewed all pertinent laboratory/tests results.    Suicide/Homicide Risk Assessment:    Risk of Harm to Self:  The following ratings are based on assessment at the time of the interview  Recent Specific Risk Factors include: current anxiety symptoms  Demographic risk factors include:   Historical Risk Factors include: chronic anxiety symptoms  Protective Factors: no current suicidal ideation, access to mental health treatment, being a parent, compliant with medications, compliant with mental health treatment, having a desire to be alive, having a sense of purpose or meaning in life, supportive  family  Weapons: none. The following steps have been taken to ensure weapons are properly secured: not applicable  Based on today's assessment, Gela presents the following risk of harm to self: low    Risk of Harm to Others:  The following ratings are based on assessment at the time of the interview  Protective Factors: no current homicidal ideation  Based on today's assessment, Gela presents the following risk of harm to others: none    The following interventions are recommended: contracts for safety at present - agrees to go to ED if feeling unsafe, return in 6 weeks for reassessment, contracts for safety at present - agrees to call Crisis Intervention Service if feeling unsafe    Medications Risks/Benefits:      Risks, Benefits And Possible Side Effects Of Medications:    Discussed risks and benefits of treatment with patient including risk of suicidality, serotonin syndrome, increased QTc interval and SIADH related to treatment with antidepressants; Risk of induction of manic symptoms in certain patient populations and risk of parkinsonian symptoms, metabolic syndrome, tardive dyskinesia and neuroleptic malignant syndrome related to treatment with antipsychotic medications     Controlled Medication Discussion:     Not applicable    Treatment Plan:    Due for update/Updated:   no  Last treatment plan done 5/7/24 by YOLANDA Rivera.  Treatment Plan due on 11/7/24.    YOLANDA Liriano 06/18/24    This note was shared with patient.

## 2024-06-21 ENCOUNTER — TELEPHONE (OUTPATIENT)
Dept: PSYCHIATRY | Facility: CLINIC | Age: 34
End: 2024-06-21

## 2024-06-21 NOTE — TELEPHONE ENCOUNTER
Left voicemail informing patient and/or parent/guardian of the Psych Encounter form needing to be signed as a requirement from the insurance company for billing purposes. Patient can access form via [employer] and sign electronically.     Please make patient aware this form must be signed for each visit as a requirement to continue future visits with provider.

## 2024-08-04 DIAGNOSIS — F33.1 MDD (MAJOR DEPRESSIVE DISORDER), RECURRENT EPISODE, MODERATE (HCC): ICD-10-CM

## 2024-08-04 DIAGNOSIS — J30.9 ALLERGIC RHINITIS, UNSPECIFIED SEASONALITY, UNSPECIFIED TRIGGER: ICD-10-CM

## 2024-08-04 RX ORDER — ESCITALOPRAM OXALATE 10 MG/1
15 TABLET ORAL DAILY
Qty: 135 TABLET | Refills: 0 | Status: SHIPPED | OUTPATIENT
Start: 2024-08-04 | End: 2024-08-12 | Stop reason: DRUGHIGH

## 2024-08-05 RX ORDER — LORATADINE 10 MG/1
10 TABLET ORAL DAILY
Qty: 90 TABLET | Refills: 1 | Status: SHIPPED | OUTPATIENT
Start: 2024-08-05

## 2024-08-12 ENCOUNTER — TELEPHONE (OUTPATIENT)
Age: 34
End: 2024-08-12

## 2024-08-12 ENCOUNTER — TELEMEDICINE (OUTPATIENT)
Dept: PSYCHIATRY | Facility: CLINIC | Age: 34
End: 2024-08-12
Payer: COMMERCIAL

## 2024-08-12 DIAGNOSIS — F41.1 GAD (GENERALIZED ANXIETY DISORDER): ICD-10-CM

## 2024-08-12 DIAGNOSIS — F43.10 POST TRAUMATIC STRESS DISORDER (PTSD): ICD-10-CM

## 2024-08-12 DIAGNOSIS — F33.1 MDD (MAJOR DEPRESSIVE DISORDER), RECURRENT EPISODE, MODERATE (HCC): Primary | ICD-10-CM

## 2024-08-12 PROCEDURE — 99214 OFFICE O/P EST MOD 30 MIN: CPT | Performed by: NURSE PRACTITIONER

## 2024-08-12 RX ORDER — ESCITALOPRAM OXALATE 20 MG/1
20 TABLET ORAL DAILY
Qty: 90 TABLET | Refills: 0 | Status: SHIPPED | OUTPATIENT
Start: 2024-08-12

## 2024-08-12 NOTE — PSYCH
Virtual Regular Visit    Verification of patient location:    Patient is located at Home in the following state in which I hold an active license [place]      Assessment/Plan:    Problem List Items Addressed This Visit     Post traumatic stress disorder (PTSD)    Relevant Medications    escitalopram (LEXAPRO) 20 mg tablet    MDD (major depressive disorder), recurrent episode, moderate (HCC) - Primary    Relevant Medications    escitalopram (LEXAPRO) 20 mg tablet    DANIELA (generalized anxiety disorder)    Relevant Medications    escitalopram (LEXAPRO) 20 mg tablet          Reason for visit is   Chief Complaint   Patient presents with   • Virtual Regular Visit            Encounter provider YOLANDA Liriano      Recent Visits  No visits were found meeting these conditions.  Showing recent visits within past 7 days and meeting all other requirements  Today's Visits  Date Type Provider Dept   24 Telemedicine YOLANDA Liriano  [facility]   Showing today's visits and meeting all other requirements  Future Appointments  No visits were found meeting these conditions.  Showing future appointments within next 150 days and meeting all other requirements       The patient was identified by name and date of birth. Gela Bell was informed that this is a telemedicine visit and that the visit is being conducted throughthe Epic Embedded platform. She agrees to proceed..  My office door was closed. No one else was in the room.  She acknowledged consent and understanding of privacy and security of the video platform. The patient has agreed to participate and understands they can discontinue the visit at any time.    Patient is aware this is a billable service.     HPI     Past Medical History:   Diagnosis Date   • Allergic    • Anxiety    • Depression        Past Surgical History:   Procedure Laterality Date   •  SECTION N/A        Current Outpatient Medications   Medication Sig Dispense Refill    • albuterol (Ventolin HFA) 90 mcg/act inhaler Inhale 2 puffs every 6 (six) hours as needed for wheezing or shortness of breath 18 g 2   • cholecalciferol (VITAMIN D3) 1,000 units tablet Take 2 tablets (2,000 Units total) by mouth daily Do not start before April 8, 2024. 180 tablet 1   • ergocalciferol (VITAMIN D2) 50,000 units Take 1 capsule (50,000 Units total) by mouth once a week 13 capsule 1   • escitalopram (LEXAPRO) 20 mg tablet Take 1 tablet (20 mg total) by mouth daily 90 tablet 0   • Fluticasone-Salmeterol (Advair Diskus) 100-50 mcg/dose inhaler Inhale 1 puff 2 (two) times a day Rinse mouth after use. 60 blister 1   • loratadine (CLARITIN) 10 mg tablet take 1 tablet by mouth once daily 90 tablet 1   • methylPREDNISolone 4 MG tablet therapy pack Use as directed on package 21 tablet 0   • prazosin (MINIPRESS) 1 mg capsule Take 1 capsule (1 mg total) by mouth daily at bedtime 90 capsule 0   • propranolol (INDERAL LA) 120 mg 24 hr capsule Take 1 capsule (120 mg total) by mouth daily 90 capsule 2   • QUEtiapine (SEROquel) 50 mg tablet Take 1 tablet (50 mg total) by mouth daily at bedtime 90 tablet 0   • fluticasone (VERAMYST) 27.5 MCG/SPRAY nasal spray 2 sprays into each nostril daily (Patient not taking: Reported on 8/12/2024) 9.1 mL 2     No current facility-administered medications for this visit.        Allergies   Allergen Reactions   • Xanax [Alprazolam] Other (See Comments)     Patient reports history of addiction to xanax. Want to ensure she is not prescribed this medication again.       Review of Systems    Video Exam    There were no vitals filed for this visit.    Physical Exam     Visit Time    Visit Start Time: 1400  Visit Stop Time: 1420  Total Visit Duration:  20 minutes    MEDICATION MANAGEMENT NOTE        [facility]    Name and Date of Birth:  Gela Bell 33 y.o. 1990 MRN: [id number]    Date of Visit: August 12,  "2024    SUBJECTIVE:    Gela is seen today for a follow up for Major Depressive Disorder, PTSD, and Generalized Anxiety Disorder. She continues to experience significant symptoms since the last visit. Gela seen virtually for medication management follow up. She was last seen by this provider on 6/18/24.  Gela reports today that she has not been doing well. She states that they recently found out that someone that they allowed to watch their daughter had been arrested for having child porn, which was very distressing to Gela. She was tearful, sad, and upset, stating that she had a lot of anxiety and depression due to this. They are taking their daughter to start therapy on Monday.  She reports that her sleep remains good.  She rates her depression a 7-8/10. Anxiety 7-8/10.  She states that she had one moment where she had thoughts of standing in front of a train, but said that it was \"only seconds\" and then the thoughts went away, stating she knows her daughter needs her and that she is her protective factor. She refused PHP at this time but did agree to an individual therapy referral.  She has no plans or intent at this time. She denies SI/HI. Denies auditory or visual hallucinations. She will increase her lexapro to 20mg PO daily and continue her other medications as ordered.  She will follow up with this provider in one month.  The patient will call this provider sooner if concerns or issues arise prior to scheduled appointment.      She denies any side effects from current psychiatric medications.    PLAN:  Increase Lexapro to 20mg PO daily  Seroquel 50mg PO HS  Prazosin 1mg PO HS  Follow up in one month  Therapy referral made today  She will call sooner with concerns or issues if they arise prior to scheduled appt    Aware of 24 hour and weekend coverage for urgent situations accessed by calling [facility] main practice number  Referral for individual " "psychotherapy  Medication management every 4 weeks  Aware of need to follow up with family physician for medical issues    Diagnoses and all orders for this visit:    MDD (major depressive disorder), recurrent episode, moderate (HCC)  -     escitalopram (LEXAPRO) 20 mg tablet; Take 1 tablet (20 mg total) by mouth daily    Post traumatic stress disorder (PTSD)  -     escitalopram (LEXAPRO) 20 mg tablet; Take 1 tablet (20 mg total) by mouth daily    DANIELA (generalized anxiety disorder)  -     escitalopram (LEXAPRO) 20 mg tablet; Take 1 tablet (20 mg total) by mouth daily      Psychotherapy Provided:     Individual psychotherapy provided: Yes  Supportive counseling provided.  Medication changes discussed with Gela.  Medication education provided to Gela.  Discussed with Gela coping with chronic anxiety and worries about her daughter .   Coping strategies reviewed with Gela.   Importance of medication and treatment compliance reviewed with Gela.  Importance of follow up with family physician for medical issues reviewed with Gela.  Reassurance and supportive therapy provided.   Crisis/safety plan discussed with Gela. Patient will call prior to scheduled appointment if they have any issues or concerns.  Patient understands they can access the office by calling the main number at any time if they are in crisis.  They also understand they can call their [facility]'s crisis number or go to their nearest ED if suicidal ideation increases or if they develop a plan or intent.       HPI ROS Appetite Changes and Sleep:     She reports  increased sleep , normal appetite, normal energy level. Denies homicidal ideation, denies suicidal ideation    Review Of Systems:      HPI ROS:               Medication Side Effects:   denies   Depression (10 worst): 7-8/10 \"Low\"   Anxiety (10 worst): 7-8/10 3-5/10   Safety concerns (SI, HI, etc): denies denies   Sleep: increased Increased sleep   Energy: adequate adequate "   Appetite: adequate adequate     General normal    Personality no change in personality   Constitutional as noted in HPI   ENT negative   Cardiovascular negative   Respiratory negative   Gastrointestinal negative   Genitourinary negative   Musculoskeletal negative   Integumentary negative   Neurological negative   Endocrine negative   Other Symptoms none, all other systems are negative     Mental Status Evaluation:    Appearance Appropriately dressed and Good eye contact   Behavior calm and cooperative   Mood anxious and depressed  Depression Scale -  7-8  of 10 (0 = No depression)  Anxiety Scale -  7-8  of 10 (0 = No anxiety)   Speech Normal rate and volume   Affect appropriate and mood-congruent   Thought Processes Goal directed and coherent   Thought Content Does not verbalize delusional material   Associations Tightly connected   Perceptual Disturbances Denies hallucinations and does not appear to be responding to internal stimuli   Risk Potential Suicidal/Homicidal Ideation - No evidence of suicidal or homicidal ideation and patient does not verbalize suicidal or homicidal ideation  Risk of Violence - No evidence of risk for violence found on assessment  Risk of Self Mutilation - No evidence of risk for self mutilation found on assessment   Orientation oriented to person, place, time/date, and situation   Memory recent and remote memory grossly intact   Consciousness alert and awake   Attention/Concentration attention span and concentration are age appropriate   Insight poor   Judgement poor   Muscle Strength and Gait normal muscle strength and normal muscle tone, normal gait/station and normal balance   Motor Activity no abnormal movements   Language no difficulty naming common objects, no difficulty repeating a phrase, no difficulty writing a sentence   Fund of Knowledge adequate knowledge of current events  adequate fund of knowledge regarding past history  adequate fund of knowledge regarding vocabulary       Past Psychiatric History Update:     Inpatient Psychiatric Admission Since Last Encounter:   no  Changes to Outpatient Psychiatric Treatment Team:    no  Suicide Attempt Or Self Mutilation Since Last Encounter:   no  Incidence of Violent Behavior Since Last Encounter:   no    Traumatic History Update:     New Onset of Abuse Since Last Encounter:   no  Traumatic Events Since Last Encounter:   no    Substance Abuse History:    Social History     Substance and Sexual Activity   Alcohol Use Not Currently    Comment: drinks very rarely 1 glass per occasion     Social History     Substance and Sexual Activity   Drug Use Never     Social History:    Social History     Socioeconomic History   • Marital status: Single     Spouse name: Not on file   • Number of children: Not on file   • Years of education: Not on file   • Highest education level: Not on file   Occupational History   • Not on file   Tobacco Use   • Smoking status: Never   • Smokeless tobacco: Never   Vaping Use   • Vaping status: Never Used   Substance and Sexual Activity   • Alcohol use: Not Currently     Comment: drinks very rarely 1 glass per occasion   • Drug use: Never   • Sexual activity: Yes     Partners: Male     Birth control/protection: I.U.D.   Other Topics Concern   • Not on file   Social History Narrative   • Not on file     Social Determinants of Health     Financial Resource Strain: Not on file   Food Insecurity: Not on file   Transportation Needs: Not on file   Physical Activity: Not on file   Stress: Not on file   Social Connections: Not on file   Intimate Partner Violence: Not on file   Housing Stability: Not on file     Family Psychiatric History:     Family History   Adopted: Yes   Problem Relation Age of Onset   • Drug abuse Mother    • Drug abuse Father      History Review:The following portions of the patient's history were reviewed and updated as appropriate: allergies, current medications, past family history, past medical history,  past social history, past surgical history, and problem list     OBJECTIVE:     Vital signs in last 24 hours:    There were no vitals filed for this visit.  Laboratory Results: I have personally reviewed all pertinent laboratory/tests results.    Suicide/Homicide Risk Assessment:    Risk of Harm to Self:  The following ratings are based on assessment at the time of the interview  Recent Specific Risk Factors include: current anxiety symptoms  Demographic risk factors include:   Historical Risk Factors include: chronic anxiety symptoms  Protective Factors: no current suicidal ideation, access to mental health treatment, being a parent, compliant with medications, compliant with mental health treatment, having a desire to be alive, having a sense of purpose or meaning in life, supportive family  Weapons: none. The following steps have been taken to ensure weapons are properly secured: not applicable  Based on today's assessment, Gela presents the following risk of harm to self: low    Risk of Harm to Others:  The following ratings are based on assessment at the time of the interview  Protective Factors: no current homicidal ideation  Based on today's assessment, Gela presents the following risk of harm to others: none    The following interventions are recommended: contracts for safety at present - agrees to go to ED if feeling unsafe, referral for psychotherapy, return in 4 weeks for reassessment, contracts for safety at present - agrees to call Crisis Intervention Service if feeling unsafe    Medications Risks/Benefits:      Risks, Benefits And Possible Side Effects Of Medications:    Discussed risks and benefits of treatment with patient including risk of suicidality, serotonin syndrome, increased QTc interval and SIADH related to treatment with antidepressants; Risk of induction of manic symptoms in certain patient populations and risk of parkinsonian symptoms, metabolic syndrome, tardive  dyskinesia and neuroleptic malignant syndrome related to treatment with antipsychotic medications     Controlled Medication Discussion:     Not applicable    Treatment Plan:    Due for update/Updated:   no  Last treatment plan done 5/7/24 by YOLANDA Rivera.  Treatment Plan due on 11/7/24.    YOLANDA Liriano 08/12/24    This note was shared with patient.

## 2024-08-12 NOTE — TELEPHONE ENCOUNTER
----- Message from YOLANDA Rivera sent at 8/12/2024  3:49 PM EDT -----  Regarding: therapy referral  Hi! Patient would like a referral for therapy please.  She would like virtual, but I don't think her insurance will cover.  Please confirm. She is otherwise only open to going to [facility]. Thank you!     Radha

## 2024-08-15 ENCOUNTER — TELEPHONE (OUTPATIENT)
Dept: PSYCHIATRY | Facility: CLINIC | Age: 34
End: 2024-08-15

## 2024-08-15 NOTE — TELEPHONE ENCOUNTER
Left voicemail informing patient and/or parent/guardian of the Psych Encounter form needing to be signed as a requirement from the insurance company for billing purposes. Patient can access form via [employer] and sign electronically.     Please make patient aware this form must be signed for each visit as a requirement to continue future visits with provider.    Encounter form 8/12/24

## 2024-08-27 ENCOUNTER — TELEPHONE (OUTPATIENT)
Dept: PSYCHIATRY | Facility: CLINIC | Age: 34
End: 2024-08-27

## 2024-08-30 ENCOUNTER — TELEPHONE (OUTPATIENT)
Dept: PSYCHIATRY | Facility: CLINIC | Age: 34
End: 2024-08-30

## 2024-08-30 NOTE — TELEPHONE ENCOUNTER
Left voicemail informing patient and/or parent/guardian of the Psych Encounter form needing to be signed as a requirement from the insurance company for billing purposes. Patient can access form via [employer] and sign electronically.     Please make patient aware this form must be signed for each visit as a requirement to continue future visits with provider.

## 2024-09-03 ENCOUNTER — TELEPHONE (OUTPATIENT)
Dept: FAMILY MEDICINE CLINIC | Facility: CLINIC | Age: 34
End: 2024-09-03

## 2024-09-04 ENCOUNTER — OFFICE VISIT (OUTPATIENT)
Dept: FAMILY MEDICINE CLINIC | Facility: CLINIC | Age: 34
End: 2024-09-04
Payer: COMMERCIAL

## 2024-09-04 ENCOUNTER — PATIENT OUTREACH (OUTPATIENT)
Dept: CASE MANAGEMENT | Facility: OTHER | Age: 34
End: 2024-09-04

## 2024-09-04 VITALS
HEART RATE: 70 BPM | WEIGHT: 190 LBS | SYSTOLIC BLOOD PRESSURE: 116 MMHG | DIASTOLIC BLOOD PRESSURE: 68 MMHG | HEIGHT: 60 IN | OXYGEN SATURATION: 98 % | TEMPERATURE: 97.5 F | RESPIRATION RATE: 18 BRPM | BODY MASS INDEX: 37.3 KG/M2

## 2024-09-04 DIAGNOSIS — R07.9 CHEST PAIN, UNSPECIFIED TYPE: ICD-10-CM

## 2024-09-04 DIAGNOSIS — J30.9 ALLERGIC RHINITIS, UNSPECIFIED SEASONALITY, UNSPECIFIED TRIGGER: ICD-10-CM

## 2024-09-04 DIAGNOSIS — G47.00 INSOMNIA, UNSPECIFIED TYPE: ICD-10-CM

## 2024-09-04 DIAGNOSIS — Z59.41 FOOD INSECURITY: ICD-10-CM

## 2024-09-04 DIAGNOSIS — H61.23 BILATERAL IMPACTED CERUMEN: ICD-10-CM

## 2024-09-04 DIAGNOSIS — R53.83 OTHER FATIGUE: ICD-10-CM

## 2024-09-04 DIAGNOSIS — R13.10 DYSPHAGIA, UNSPECIFIED TYPE: ICD-10-CM

## 2024-09-04 DIAGNOSIS — K21.9 GASTROESOPHAGEAL REFLUX DISEASE, UNSPECIFIED WHETHER ESOPHAGITIS PRESENT: ICD-10-CM

## 2024-09-04 DIAGNOSIS — Z59.9 FINANCIAL DIFFICULTIES: ICD-10-CM

## 2024-09-04 DIAGNOSIS — Z00.00 ANNUAL PHYSICAL EXAM: Primary | ICD-10-CM

## 2024-09-04 DIAGNOSIS — Z59.82 TRANSPORTATION INSECURITY: Primary | ICD-10-CM

## 2024-09-04 PROCEDURE — T1015 CLINIC SERVICE: HCPCS | Performed by: FAMILY MEDICINE

## 2024-09-04 PROCEDURE — 99395 PREV VISIT EST AGE 18-39: CPT | Performed by: NURSE PRACTITIONER

## 2024-09-04 PROCEDURE — 93005 ELECTROCARDIOGRAM TRACING: CPT | Performed by: FAMILY MEDICINE

## 2024-09-04 RX ORDER — OMEPRAZOLE 40 MG/1
40 CAPSULE, DELAYED RELEASE ORAL DAILY
Qty: 56 CAPSULE | Refills: 0 | Status: SHIPPED | OUTPATIENT
Start: 2024-09-04

## 2024-09-04 RX ORDER — CETIRIZINE HYDROCHLORIDE 10 MG/1
10 TABLET ORAL DAILY
COMMUNITY

## 2024-09-04 SDOH — ECONOMIC STABILITY - TRANSPORTATION SECURITY: TRANSPORTATION INSECURITY: Z59.82

## 2024-09-04 SDOH — ECONOMIC STABILITY - FOOD INSECURITY: FOOD INSECURITY: Z59.41

## 2024-09-04 SDOH — ECONOMIC STABILITY - INCOME SECURITY: PROBLEM RELATED TO HOUSING AND ECONOMIC CIRCUMSTANCES, UNSPECIFIED: Z59.9

## 2024-09-04 NOTE — PROGRESS NOTES
Adult Annual Physical  Name: Gela Bell      : 1990      MRN: [id number]  Encounter Provider: YOLANDA Henao  Encounter Date: 2024   Encounter department: [facility]    Assessment & Plan   1. Annual physical exam  2. Allergic rhinitis, unspecified seasonality, unspecified trigger  Comments:  Has been out of Veramyst  Reordered today  RTC if no improvement  Orders:  -     fluticasone (VERAMYST) 27.5 MCG/SPRAY nasal spray; 2 sprays into each nostril daily  3. BMI 37.0-37.9, adult  -     Ambulatory Referral to Sleep Medicine; Future  4. Food insecurity  -     Ambulatory Referral to Social Work Care Management Program; Future  5. Financial difficulties  -     Ambulatory Referral to Social Work Care Management Program; Future  6. Other fatigue  -     Ambulatory Referral to Sleep Medicine; Future  7. Insomnia, unspecified type  Comments:  Sleep med referral; suspect MADONNA  Alarm s/s for RTC rev'd  Orders:  -     Ambulatory Referral to Sleep Medicine; Future  8. Gastroesophageal reflux disease, unspecified whether esophagitis present  Comments:  Eliminate offending foods/drinks  Start 8 week PPI  RTC 2 mos  If no improvement, consider GI referral  Orders:  -     omeprazole (PriLOSEC) 40 MG capsule; Take 1 capsule (40 mg total) by mouth daily  9. Dysphagia, unspecified type  Comments:  With bread/carbs  Small bites, chew completely, fluid use rev'd  PPI x 8 weeks; GI referral if s/s persist  Alarm s/s for RTC rev'd  Orders:  -     omeprazole (PriLOSEC) 40 MG capsule; Take 1 capsule (40 mg total) by mouth daily  10. Chest pain, unspecified type  Comments:  Nonspecific history given today  GERD v. atypical v. cardiac  POCT ECG completed  Orders:  -     POCT ECG  11. Bilateral impacted cerumen  Comments:  RTC 1 mo for removal    Immunizations and preventive care screenings were discussed with patient today. Appropriate education was printed on patient's after visit  summary.    Counseling:  Alcohol/drug use: discussed moderation in alcohol intake, the recommendations for healthy alcohol use, and avoidance of illicit drug use.  Dental Health: discussed importance of regular tooth brushing, flossing, and dental visits.  Injury prevention: discussed safety/seat belts, safety helmets, smoke detectors, carbon dioxide detectors, and smoking near bedding or upholstery.  Sexual health: discussed sexually transmitted diseases, partner selection, use of condoms, avoidance of unintended pregnancy, and contraceptive alternatives.  Exercise: the importance of regular exercise/physical activity was discussed. Recommend exercise 3-5 times per week for at least 30 minutes.       Depression Screening and Follow-up Plan: Patient's depression screening was positive with a PHQ-9 score of 11. Continue regular follow-up with their mental health provider who is managing their mental health condition(s).       History of Present Illness     Adult Annual Physical:  Patient presents for annual physical. PMH anxiety, mood disorder (follows w/ PMHNP), vitamin D deficiency, and chronic migraine presents for AWV. Labs ordered but not done. Patient endorses difficulty swallowing breads, chronic fatigue, insomnia, and chest pain at times. Denies any specific inciting/relieving factors associated w/ chest pain..     Diet and Physical Activity:  - Diet/Nutrition: frequent junk food. limited fruits/veggies  - Exercise: walking and 1-2 times a week on average.    Depression Screening:    - PHQ-9 Score: 11    General Health:  - Sleep: sleeps poorly.  - Hearing: normal hearing bilateral ears.  - Vision: most recent eye exam > 1 year ago and no vision problems.  - Dental: brushes teeth twice daily and does not floss.    /GYN Health:  - Follows with GYN: yes.   - Menopause: premenopausal.   - History of STDs: no  - Contraception: IUD placement.      Advanced Care Planning:  - Has an advanced directive?: no    - Has  a durable medical POA?: no    - ACP document given to patient?: yes      Review of Systems   Constitutional:  Positive for fatigue. Negative for activity change, appetite change and unexpected weight change.   HENT:  Positive for congestion, postnasal drip, rhinorrhea, sinus pressure, sinus pain, sneezing and trouble swallowing (breads). Negative for ear pain, hearing loss, nosebleeds and sore throat.    Eyes:  Negative for photophobia.   Respiratory:  Negative for cough, shortness of breath and wheezing.    Cardiovascular:  Positive for chest pain (under breast bone on right). Negative for palpitations and leg swelling.   Gastrointestinal:  Negative for abdominal pain, blood in stool, constipation, diarrhea and nausea.        Heartburn   Endocrine: Negative for polydipsia, polyphagia and polyuria.   Genitourinary:  Negative for difficulty urinating, dysuria, frequency, hematuria and urgency.   Musculoskeletal:  Negative for arthralgias, back pain and myalgias.   Skin:  Negative for color change and rash.   Neurological:  Positive for headaches. Negative for dizziness, tremors, syncope and weakness.   Psychiatric/Behavioral:  Positive for sleep disturbance. Negative for agitation, confusion, self-injury and suicidal ideas.      Pertinent Medical History       Medical History Reviewed by provider this encounter:  Tobacco  Allergies  Meds  Problems  Med Hx  Surg Hx  Fam Hx       Past Medical History   Past Medical History:   Diagnosis Date   • Allergic    • Anxiety    • Depression      Past Surgical History:   Procedure Laterality Date   •  SECTION N/A      Family History   Adopted: Yes   Problem Relation Age of Onset   • Drug abuse Mother    • Drug abuse Father      Current Outpatient Medications on File Prior to Visit   Medication Sig Dispense Refill   • albuterol (Ventolin HFA) 90 mcg/act inhaler Inhale 2 puffs every 6 (six) hours as needed for wheezing or shortness of breath 18 g 2   •  aspirin-acetaminophen-caffeine (EXCEDRIN MIGRAINE) 250-250-65 MG per tablet Take 1 tablet by mouth every 6 (six) hours as needed for headaches     • cetirizine (ZyrTEC) 10 mg tablet Take 10 mg by mouth daily     • cholecalciferol (VITAMIN D3) 1,000 units tablet Take 2 tablets (2,000 Units total) by mouth daily Do not start before April 8, 2024. 180 tablet 1   • escitalopram (LEXAPRO) 20 mg tablet Take 1 tablet (20 mg total) by mouth daily 90 tablet 0   • loratadine (CLARITIN) 10 mg tablet take 1 tablet by mouth once daily 90 tablet 1   • prazosin (MINIPRESS) 1 mg capsule Take 1 capsule (1 mg total) by mouth daily at bedtime 90 capsule 0   • propranolol (INDERAL LA) 120 mg 24 hr capsule Take 1 capsule (120 mg total) by mouth daily 90 capsule 2   • QUEtiapine (SEROquel) 50 mg tablet Take 1 tablet (50 mg total) by mouth daily at bedtime 90 tablet 0   • [DISCONTINUED] ergocalciferol (VITAMIN D2) 50,000 units Take 1 capsule (50,000 Units total) by mouth once a week (Patient not taking: Reported on 9/4/2024) 13 capsule 1   • [DISCONTINUED] fluticasone (VERAMYST) 27.5 MCG/SPRAY nasal spray 2 sprays into each nostril daily (Patient not taking: Reported on 8/12/2024) 9.1 mL 2   • [DISCONTINUED] Fluticasone-Salmeterol (Advair Diskus) 100-50 mcg/dose inhaler Inhale 1 puff 2 (two) times a day Rinse mouth after use. (Patient not taking: Reported on 9/4/2024) 60 blister 1   • [DISCONTINUED] methylPREDNISolone 4 MG tablet therapy pack Use as directed on package (Patient not taking: Reported on 9/4/2024) 21 tablet 0     No current facility-administered medications on file prior to visit.     Allergies   Allergen Reactions   • Xanax [Alprazolam] Other (See Comments)     Patient reports history of addiction to xanax. Want to ensure she is not prescribed this medication again.      Current Outpatient Medications on File Prior to Visit   Medication Sig Dispense Refill   • albuterol (Ventolin HFA) 90 mcg/act inhaler Inhale 2 puffs every  6 (six) hours as needed for wheezing or shortness of breath 18 g 2   • aspirin-acetaminophen-caffeine (EXCEDRIN MIGRAINE) 250-250-65 MG per tablet Take 1 tablet by mouth every 6 (six) hours as needed for headaches     • cetirizine (ZyrTEC) 10 mg tablet Take 10 mg by mouth daily     • cholecalciferol (VITAMIN D3) 1,000 units tablet Take 2 tablets (2,000 Units total) by mouth daily Do not start before April 8, 2024. 180 tablet 1   • escitalopram (LEXAPRO) 20 mg tablet Take 1 tablet (20 mg total) by mouth daily 90 tablet 0   • loratadine (CLARITIN) 10 mg tablet take 1 tablet by mouth once daily 90 tablet 1   • prazosin (MINIPRESS) 1 mg capsule Take 1 capsule (1 mg total) by mouth daily at bedtime 90 capsule 0   • propranolol (INDERAL LA) 120 mg 24 hr capsule Take 1 capsule (120 mg total) by mouth daily 90 capsule 2   • QUEtiapine (SEROquel) 50 mg tablet Take 1 tablet (50 mg total) by mouth daily at bedtime 90 tablet 0   • [DISCONTINUED] ergocalciferol (VITAMIN D2) 50,000 units Take 1 capsule (50,000 Units total) by mouth once a week (Patient not taking: Reported on 9/4/2024) 13 capsule 1   • [DISCONTINUED] fluticasone (VERAMYST) 27.5 MCG/SPRAY nasal spray 2 sprays into each nostril daily (Patient not taking: Reported on 8/12/2024) 9.1 mL 2   • [DISCONTINUED] Fluticasone-Salmeterol (Advair Diskus) 100-50 mcg/dose inhaler Inhale 1 puff 2 (two) times a day Rinse mouth after use. (Patient not taking: Reported on 9/4/2024) 60 blister 1   • [DISCONTINUED] methylPREDNISolone 4 MG tablet therapy pack Use as directed on package (Patient not taking: Reported on 9/4/2024) 21 tablet 0     No current facility-administered medications on file prior to visit.      Social History     Tobacco Use   • Smoking status: Never   • Smokeless tobacco: Never   Vaping Use   • Vaping status: Never Used   Substance and Sexual Activity   • Alcohol use: Not Currently     Comment: drinks very rarely 1 glass per occasion   • Drug use: Never   •  Sexual activity: Yes     Partners: Male     Birth control/protection: I.U.D.       Objective     /68   Pulse 70   Temp 97.5 °F (36.4 °C)   Resp 18   Ht 5' (1.524 m)   Wt 86.2 kg (190 lb)   SpO2 98%   BMI 37.11 kg/m²     Physical Exam  Vitals and nursing note reviewed.   Constitutional:       General: She is not in acute distress.     Appearance: She is well-developed. She is obese.   HENT:      Head: Normocephalic and atraumatic.      Right Ear: There is impacted cerumen.      Left Ear: There is impacted cerumen.      Nose: Congestion present.      Mouth/Throat:      Mouth: Mucous membranes are moist.      Pharynx: Oropharynx is clear. No posterior oropharyngeal erythema.   Eyes:      Conjunctiva/sclera: Conjunctivae normal.   Cardiovascular:      Rate and Rhythm: Normal rate and regular rhythm.      Heart sounds: No murmur heard.  Pulmonary:      Effort: Pulmonary effort is normal. No respiratory distress.      Breath sounds: Normal breath sounds.   Abdominal:      General: Bowel sounds are normal.      Palpations: Abdomen is soft.      Tenderness: There is no abdominal tenderness.   Genitourinary:     Comments: Exam deferred  Musculoskeletal:         General: No swelling.   Skin:     General: Skin is warm and dry.      Capillary Refill: Capillary refill takes less than 2 seconds.   Neurological:      Mental Status: She is alert and oriented to person, place, and time.   Psychiatric:         Mood and Affect: Mood normal.

## 2024-09-04 NOTE — PATIENT INSTRUCTIONS
"Patient Education     Routine physical for adults   The Basics   Written by the doctors and editors at [facility]   What is a physical? -- A physical is a routine visit, or \"check-up,\" with your doctor. You might also hear it called a \"wellness visit\" or \"preventive visit.\"  During each visit, the doctor will:   Ask about your physical and mental health   Ask about your habits, behaviors, and lifestyle   Do an exam   Give you vaccines if needed   Talk to you about any medicines you take   Give advice about your health   Answer your questions  Getting regular check-ups is an important part of taking care of your health. It can help your doctor find and treat any problems you have. But it's also important for preventing health problems.  A routine physical is different from a \"sick visit.\" A sick visit is when you see a doctor because of a health concern or problem. Since physicals are scheduled ahead of time, you can think about what you want to ask the doctor.  How often should I get a physical? -- It depends on your age and health. In general, for people age 21 years and older:   If you are younger than 50 years, you might be able to get a physical every 3 years.   If you are 50 years or older, your doctor might recommend a physical every year.  If you have an ongoing health condition, like diabetes or high blood pressure, your doctor will probably want to see you more often.  What happens during a physical? -- In general, each visit will include:   Physical exam - The doctor or nurse will check your height, weight, heart rate, and blood pressure. They will also look at your eyes and ears. They will ask about how you are feeling and whether you have any symptoms that bother you.   Medicines - It's a good idea to bring a list of all the medicines you take to each doctor visit. Your doctor will talk to you about your medicines and answer any questions. Tell them if you are having any side effects that bother you. You " "should also tell them if you are having trouble paying for any of your medicines.   Habits and behaviors - This includes:   Your diet   Your exercise habits   Whether you smoke, drink alcohol, or use drugs   Whether you are sexually active   Whether you feel safe at home  Your doctor will talk to you about things you can do to improve your health and lower your risk of health problems. They will also offer help and support. For example, if you want to quit smoking, they can give you advice and might prescribe medicines. If you want to improve your diet or get more physical activity, they can help you with this, too.   Lab tests, if needed - The tests you get will depend on your age and situation. For example, your doctor might want to check your:   Cholesterol   Blood sugar   Iron level   Vaccines - The recommended vaccines will depend on your age, health, and what vaccines you already had. Vaccines are very important because they can prevent certain serious or deadly infections.   Discussion of screening - \"Screening\" means checking for diseases or other health problems before they cause symptoms. Your doctor can recommend screening based on your age, risk, and preferences. This might include tests to check for:   Cancer, such as breast, prostate, cervical, ovarian, colorectal, prostate, lung, or skin cancer   Sexually transmitted infections, such as chlamydia and gonorrhea   Mental health conditions like depression and anxiety  Your doctor will talk to you about the different types of screening tests. They can help you decide which screenings to have. They can also explain what the results might mean.   Answering questions - The physical is a good time to ask the doctor or nurse questions about your health. If needed, they can refer you to other doctors or specialists, too.  Adults older than 65 years often need other care, too. As you get older, your doctor will talk to you about:   How to prevent falling at " home   Hearing or vision tests   Memory testing   How to take your medicines safely   Making sure that you have the help and support you need at home  All topics are updated as new evidence becomes available and our peer review process is complete.  This topic retrieved from [employer] on: May 02, 2024.  Topic 347086 Version 1.0  Release: 32.4.3 - C32.122  © 2024 UpToDate, Inc. and/or its affiliates. All rights reserved.  Consumer Information Use and Disclaimer   Disclaimer: This generalized information is a limited summary of diagnosis, treatment, and/or medication information. It is not meant to be comprehensive and should be used as a tool to help the user understand and/or assess potential diagnostic and treatment options. It does NOT include all information about conditions, treatments, medications, side effects, or risks that may apply to a specific patient. It is not intended to be medical advice or a substitute for the medical advice, diagnosis, or treatment of a health care provider based on the health care provider's examination and assessment of a patient's specific and unique circumstances. Patients must speak with a health care provider for complete information about their health, medical questions, and treatment options, including any risks or benefits regarding use of medications. This information does not endorse any treatments or medications as safe, effective, or approved for treating a specific patient. UpToDate, Inc. and its affiliates disclaim any warranty or liability relating to this information or the use thereof.The use of this information is governed by the Terms of Use, available at https://www.woltersBharat Matrimonyuwer.com/en/know/clinical-effectiveness-terms. 2024© UpToDate, Inc. and its affiliates and/or licensors. All rights reserved.  Copyright   © 2024 UpToDate, Inc. and/or its affiliates. All rights reserved.

## 2024-09-04 NOTE — PROGRESS NOTES
ILEANA KELSEY covering for ILEANA Grewal.    ILEANA KELSEY received referral for patient from YOLANDA Pisano, for Food insecurity and Financial difficulties. Patient does not have any prior OP ILEANA KELSEY outreach.    ILEANA KELSEY placed initial outreach call to patient and introduced self, explained role and reasoning for outreach. Patient reported that she and her S/O receive food stamps, but it is not enough for the month. ILEANA KELSEY reviewed food pantries and patient was agreeable to ILEANA KELSEY sending them to patient's email, [email or web address], via FindHelp.    Patient was referred on Findhelp to [facility] ( Program) for food.    Patient was referred on Findhelp to [facility] (Food Pantry) for food.    Patient was referred on Findhelp to [facility] (Food Pantry) for food.    Patient was referred on Findhelp to [facility] (Outreach Program) for food.    Patient was referred on Findhelp to [facility] ([facility] Food Pantry) for food.    Patient also reported that she needs assistance with transportation. ILEANA KELSEY confirmed that patient lives in [place] and reviewed the [facility] System (STS). Patient confirmed interest in applying. ILEANA KELSEY reviewed role of CMOC and patient was agreeable to referral.    Patient reported concern with paying rent. ILEANA KELSEY discussed the Property Tax/Rent Rebate Program and patient said she would also like assistance applying for this, or other rental programs, if she qualifies under the criteria.     Patient stated she has tried to apply for SSD, but has been denied in the past. She reported she has a lot of anxiety around filling out applications and would also like assistance with applying for SSD. ILEANA KELSEY added this to CMOC referral.    Patient reported no other needs at this time. ILEANA KELSEY open Socially Complex episode. Note routed to ILEANA Grewal and also added to  care team. Note also routed to PCP to provide update.

## 2024-09-08 ENCOUNTER — OFFICE VISIT (OUTPATIENT)
Dept: URGENT CARE | Facility: MEDICAL CENTER | Age: 34
End: 2024-09-08
Payer: COMMERCIAL

## 2024-09-08 VITALS
SYSTOLIC BLOOD PRESSURE: 100 MMHG | DIASTOLIC BLOOD PRESSURE: 62 MMHG | RESPIRATION RATE: 16 BRPM | WEIGHT: 188 LBS | OXYGEN SATURATION: 97 % | HEART RATE: 65 BPM | BODY MASS INDEX: 36.72 KG/M2 | TEMPERATURE: 97.2 F

## 2024-09-08 DIAGNOSIS — R11.0 NAUSEA: ICD-10-CM

## 2024-09-08 DIAGNOSIS — R51.9 ACUTE NONINTRACTABLE HEADACHE, UNSPECIFIED HEADACHE TYPE: Primary | ICD-10-CM

## 2024-09-08 LAB — SL AMB POCT URINE HCG: NEGATIVE

## 2024-09-08 PROCEDURE — 99212 OFFICE O/P EST SF 10 MIN: CPT | Performed by: PHYSICIAN ASSISTANT

## 2024-09-08 PROCEDURE — 81025 URINE PREGNANCY TEST: CPT | Performed by: PHYSICIAN ASSISTANT

## 2024-09-09 ENCOUNTER — PATIENT OUTREACH (OUTPATIENT)
Dept: CASE MANAGEMENT | Facility: OTHER | Age: 34
End: 2024-09-09

## 2024-09-09 NOTE — PROGRESS NOTES
[facility] received a referral from [facility] to assist patient with applying for transportation and to discuss disability.     [facility] contacted Gela to discuss the referral and she's agreeable to services.   [facility] contacted [employer] and s/w Мария. Patient was registered for the 30-day temporary approval and the application will be emailed to this writer to assist with its completion and submission.     Gela disclosed she previously applied for disability and was denied. She believes she would quailify but did not appeal the decision. [facility] explained the process of using a  to reapply and Gela is interested in more information.   [facility] emailed the flyer for [employer] to [email or web address] so she may review it and contact the agency if desired.     Gela inquired about any rental assistance programs and [facility] explained the emergency rental assistance program. She's interested in more information.   Patient was referred on Findhelp to [facility] for rental assistance.     Will outreach next week to for status updates and to assist as needed.    2-3 days

## 2024-09-10 ENCOUNTER — PATIENT OUTREACH (OUTPATIENT)
Dept: CASE MANAGEMENT | Facility: OTHER | Age: 34
End: 2024-09-10

## 2024-09-10 NOTE — PROGRESS NOTES
ILEANA CM received IB messages from covering SW CM and CM OC. CM OC assisted pt with applying fo MATP and registering for 30 day temporary approval for service.    CM OC provided information for [employer] to pt for assistance with SSDI appeal and ERAP with [employer].    ILEANA CM will continue to follow.

## 2024-09-16 ENCOUNTER — PATIENT OUTREACH (OUTPATIENT)
Dept: CASE MANAGEMENT | Facility: OTHER | Age: 34
End: 2024-09-16

## 2024-09-16 NOTE — PROGRESS NOTES
[facility] contacted Gela for a status update on her MATP application and to see if she contacted [facility] regarding her disability.     Gela denies receiving any correspondence from [facility] at time of outreach and states she hasn't yet called [facility].     Will outreach at the end of the week to confirm receipt of the MATP application and to assess for any further needs.

## 2024-09-20 ENCOUNTER — PATIENT OUTREACH (OUTPATIENT)
Dept: CASE MANAGEMENT | Facility: OTHER | Age: 34
End: 2024-09-20

## 2024-09-20 NOTE — PROGRESS NOTES
CMOC contacted Gela to discuss her [facility] application.    The paper application was completed and emailed to [employer] for processing and received a return email that application was received and approved.  Gela is able to utilize the services for all medical and  appointments.     CM confirmed there are no other needs at this time.     Informed Gela the referral will be closed. She may contact this writer, the CM SW, or her PCP for any future needs. Gela expressed understanding and thanks the Care Management team for their assistance.     No further outreaches will be made.    Detail Level: Detailed

## 2024-09-26 ENCOUNTER — PATIENT OUTREACH (OUTPATIENT)
Dept: CASE MANAGEMENT | Facility: OTHER | Age: 34
End: 2024-09-26

## 2024-09-26 NOTE — PROGRESS NOTES
ILEANA KELSEY received IB from CM OC with update. Pt approved for MATP. Pt stated there are no other needs at this time.     ILEANA CM resolved episode and removed self from care team. OP CM team available should pt have needs in the future.

## 2024-10-03 DIAGNOSIS — F33.1 MDD (MAJOR DEPRESSIVE DISORDER), RECURRENT EPISODE, MODERATE (HCC): ICD-10-CM

## 2024-10-03 RX ORDER — QUETIAPINE FUMARATE 50 MG/1
50 TABLET, FILM COATED ORAL DAILY
Qty: 30 TABLET | Refills: 0 | Status: SHIPPED | OUTPATIENT
Start: 2024-10-03

## 2024-10-09 DIAGNOSIS — E55.9 VITAMIN D DEFICIENCY: ICD-10-CM

## 2024-10-09 RX ORDER — CHOLECALCIFEROL (VITAMIN D3) 25 MCG
2000 TABLET ORAL DAILY
Qty: 180 TABLET | Refills: 1 | Status: SHIPPED | OUTPATIENT
Start: 2024-10-09

## 2024-10-30 DIAGNOSIS — R13.10 DYSPHAGIA, UNSPECIFIED TYPE: ICD-10-CM

## 2024-10-30 DIAGNOSIS — K21.9 GASTROESOPHAGEAL REFLUX DISEASE, UNSPECIFIED WHETHER ESOPHAGITIS PRESENT: ICD-10-CM

## 2024-10-30 RX ORDER — OMEPRAZOLE 40 MG/1
40 CAPSULE, DELAYED RELEASE ORAL DAILY
Qty: 56 CAPSULE | Refills: 0 | Status: SHIPPED | OUTPATIENT
Start: 2024-10-30 | End: 2024-11-05

## 2024-11-04 DIAGNOSIS — F33.1 MDD (MAJOR DEPRESSIVE DISORDER), RECURRENT EPISODE, MODERATE (HCC): ICD-10-CM

## 2024-11-04 DIAGNOSIS — G43.E01 CHRONIC MIGRAINE WITH AURA AND WITH STATUS MIGRAINOSUS, NOT INTRACTABLE: ICD-10-CM

## 2024-11-04 DIAGNOSIS — F43.10 POST TRAUMATIC STRESS DISORDER (PTSD): ICD-10-CM

## 2024-11-04 DIAGNOSIS — F41.1 GAD (GENERALIZED ANXIETY DISORDER): ICD-10-CM

## 2024-11-04 RX ORDER — PROPRANOLOL HYDROCHLORIDE 120 MG/1
120 CAPSULE, EXTENDED RELEASE ORAL DAILY
Qty: 90 CAPSULE | Refills: 2 | Status: SHIPPED | OUTPATIENT
Start: 2024-11-04

## 2024-11-05 ENCOUNTER — OFFICE VISIT (OUTPATIENT)
Dept: FAMILY MEDICINE CLINIC | Facility: CLINIC | Age: 34
End: 2024-11-05
Payer: COMMERCIAL

## 2024-11-05 VITALS
TEMPERATURE: 98.4 F | BODY MASS INDEX: 38.48 KG/M2 | WEIGHT: 196 LBS | RESPIRATION RATE: 18 BRPM | HEART RATE: 71 BPM | SYSTOLIC BLOOD PRESSURE: 104 MMHG | DIASTOLIC BLOOD PRESSURE: 62 MMHG | OXYGEN SATURATION: 98 % | HEIGHT: 60 IN

## 2024-11-05 DIAGNOSIS — J30.9 ALLERGIC RHINITIS, UNSPECIFIED SEASONALITY, UNSPECIFIED TRIGGER: ICD-10-CM

## 2024-11-05 DIAGNOSIS — R13.10 DYSPHAGIA, UNSPECIFIED TYPE: ICD-10-CM

## 2024-11-05 DIAGNOSIS — K21.9 GASTROESOPHAGEAL REFLUX DISEASE, UNSPECIFIED WHETHER ESOPHAGITIS PRESENT: ICD-10-CM

## 2024-11-05 DIAGNOSIS — Z23 NEED FOR VACCINATION: Primary | ICD-10-CM

## 2024-11-05 PROCEDURE — 99213 OFFICE O/P EST LOW 20 MIN: CPT | Performed by: NURSE PRACTITIONER

## 2024-11-05 PROCEDURE — T1015 CLINIC SERVICE: HCPCS | Performed by: FAMILY MEDICINE

## 2024-11-05 RX ORDER — ESCITALOPRAM OXALATE 20 MG/1
20 TABLET ORAL DAILY
Qty: 90 TABLET | Refills: 0 | Status: SHIPPED | OUTPATIENT
Start: 2024-11-05

## 2024-11-05 RX ORDER — PRAZOSIN HYDROCHLORIDE 1 MG/1
1 CAPSULE ORAL
Qty: 90 CAPSULE | Refills: 0 | Status: SHIPPED | OUTPATIENT
Start: 2024-11-05

## 2024-11-05 NOTE — PROGRESS NOTES
Ambulatory Visit  Name: Gela Bell      : 1990      MRN: [id number]  Encounter Provider: YOLANDA Henao  Encounter Date: 2024   Encounter department: [facility]    Assessment & Plan  Gastroesophageal reflux disease, unspecified whether esophagitis present  Reduce PPI to 20 mg daily x 1 month  Avoid offending foods/drinks  RTC 2 months; if s/s persist consider GI referral  Alarm s/s for sooner RTC rev'd  Orders:    omeprazole (PriLOSEC) 20 mg delayed release capsule; Take 1 capsule (20 mg total) by mouth daily    Allergic rhinitis, unspecified seasonality, unspecified trigger  Use Veramyst as ordered  Check [place] allergy panel  Orders:    [facility] Allergy Panel, Adult; Future    Dysphagia, unspecified type  Resolved with PPI use  Orders:    omeprazole (PriLOSEC) 20 mg delayed release capsule; Take 1 capsule (20 mg total) by mouth daily    Need for vaccination    Orders:    Tdap vaccine greater than or equal to 8yo IM    influenza vaccine preservative-free 0.5 mL IM (Fluzone, Afluria, Fluarix, Flulaval)       History of Present Illness     PMH anxiety, mood disorder (follows w/ PMHNP), vitamin D deficiency, and chronic migraine presents for GERD follow up. Remains on medication regimen from previous appointment. Endorses if patient misses a dose of omeprazole she gets a headache. When missed dose did not notice if GERD symptoms persisted, just noticed headache.   She endorses some nasal congestion which is improving. However, she has not been using her Veramyst (states she has at home).    Heartburn  She complains of coughing (recent illness), a sore throat (recent illness) and wheezing (recent illness). She reports no abdominal pain, no belching, no chest pain, no choking, no dysphagia, no heartburn, no hoarse voice or no nausea. This is a chronic problem. The current episode started more than 1 month ago. The problem occurs occasionally. The problem has  been gradually improving. The symptoms are aggravated by certain foods (pancho, and certain chips). Associated symptoms include fatigue. Pertinent negatives include no anemia, melena, muscle weakness, orthopnea or weight loss. There are no known risk factors. She has tried a PPI for the symptoms. The treatment provided significant relief.       History obtained from : patient  Review of Systems   Constitutional:  Positive for fatigue. Negative for activity change, appetite change and weight loss.   HENT:  Positive for congestion, postnasal drip, sinus pressure, sinus pain and sore throat (recent illness). Negative for ear pain, hoarse voice and sneezing.    Respiratory:  Positive for cough (recent illness) and wheezing (recent illness). Negative for choking and shortness of breath.    Cardiovascular:  Negative for chest pain.   Gastrointestinal:  Negative for abdominal pain, constipation, diarrhea, dysphagia, heartburn, melena and nausea.   Genitourinary:  Negative for difficulty urinating.   Musculoskeletal:  Negative for arthralgias, myalgias and muscle weakness.   Neurological:  Positive for headaches (migraine; 1-2x). Negative for dizziness and weakness.   Psychiatric/Behavioral:  Positive for sleep disturbance (constantly).      Pertinent Medical History       Medical History Reviewed by provider this encounter:       Past Medical History   Past Medical History:   Diagnosis Date    Allergic     Anxiety     Depression      Past Surgical History:   Procedure Laterality Date     SECTION N/A      Family History   Adopted: Yes   Problem Relation Age of Onset    Drug abuse Mother     Drug abuse Father      Current Outpatient Medications on File Prior to Visit   Medication Sig Dispense Refill    albuterol (Ventolin HFA) 90 mcg/act inhaler Inhale 2 puffs every 6 (six) hours as needed for wheezing or shortness of breath 18 g 2    aspirin-acetaminophen-caffeine (EXCEDRIN MIGRAINE) 250-250-65 MG per tablet Take 1  tablet by mouth every 6 (six) hours as needed for headaches      cetirizine (ZyrTEC) 10 mg tablet Take 10 mg by mouth daily      cholecalciferol (VITAMIN D3) 1,000 units tablet take 2 tablets by mouth daily 180 tablet 1    escitalopram (LEXAPRO) 20 mg tablet take 1 tablet by mouth once daily 90 tablet 0    loratadine (CLARITIN) 10 mg tablet take 1 tablet by mouth once daily 90 tablet 1    prazosin (MINIPRESS) 1 mg capsule take 1 capsule by mouth at bedtime 90 capsule 0    propranolol (INDERAL LA) 120 mg 24 hr capsule take 1 capsule by mouth once daily 90 capsule 2    QUEtiapine (SEROquel) 50 mg tablet take 1 tablet by mouth once daily 30 tablet 0    [DISCONTINUED] omeprazole (PriLOSEC) 40 MG capsule take 1 capsule by mouth once daily 56 capsule 0    fluticasone (VERAMYST) 27.5 MCG/SPRAY nasal spray 2 sprays into each nostril daily (Patient not taking: Reported on 9/8/2024) 9.1 mL 2    [DISCONTINUED] escitalopram (LEXAPRO) 20 mg tablet Take 1 tablet (20 mg total) by mouth daily 90 tablet 0    [DISCONTINUED] prazosin (MINIPRESS) 1 mg capsule Take 1 capsule (1 mg total) by mouth daily at bedtime 90 capsule 0     No current facility-administered medications on file prior to visit.     Allergies   Allergen Reactions    Xanax [Alprazolam] Other (See Comments)     Patient reports history of addiction to xanax. Want to ensure she is not prescribed this medication again.      Current Outpatient Medications on File Prior to Visit   Medication Sig Dispense Refill    albuterol (Ventolin HFA) 90 mcg/act inhaler Inhale 2 puffs every 6 (six) hours as needed for wheezing or shortness of breath 18 g 2    aspirin-acetaminophen-caffeine (EXCEDRIN MIGRAINE) 250-250-65 MG per tablet Take 1 tablet by mouth every 6 (six) hours as needed for headaches      cetirizine (ZyrTEC) 10 mg tablet Take 10 mg by mouth daily      cholecalciferol (VITAMIN D3) 1,000 units tablet take 2 tablets by mouth daily 180 tablet 1    escitalopram (LEXAPRO) 20 mg  tablet take 1 tablet by mouth once daily 90 tablet 0    loratadine (CLARITIN) 10 mg tablet take 1 tablet by mouth once daily 90 tablet 1    prazosin (MINIPRESS) 1 mg capsule take 1 capsule by mouth at bedtime 90 capsule 0    propranolol (INDERAL LA) 120 mg 24 hr capsule take 1 capsule by mouth once daily 90 capsule 2    QUEtiapine (SEROquel) 50 mg tablet take 1 tablet by mouth once daily 30 tablet 0    [DISCONTINUED] omeprazole (PriLOSEC) 40 MG capsule take 1 capsule by mouth once daily 56 capsule 0    fluticasone (VERAMYST) 27.5 MCG/SPRAY nasal spray 2 sprays into each nostril daily (Patient not taking: Reported on 9/8/2024) 9.1 mL 2    [DISCONTINUED] escitalopram (LEXAPRO) 20 mg tablet Take 1 tablet (20 mg total) by mouth daily 90 tablet 0    [DISCONTINUED] prazosin (MINIPRESS) 1 mg capsule Take 1 capsule (1 mg total) by mouth daily at bedtime 90 capsule 0     No current facility-administered medications on file prior to visit.      Social History     Tobacco Use    Smoking status: Never    Smokeless tobacco: Never   Vaping Use    Vaping status: Never Used   Substance and Sexual Activity    Alcohol use: Not Currently     Comment: drinks very rarely 1 glass per occasion    Drug use: Never    Sexual activity: Yes     Partners: Male     Birth control/protection: I.U.D.         Objective     /62 (BP Location: Left arm, Patient Position: Standing, Cuff Size: Standard)   Pulse 71   Temp 98.4 °F (36.9 °C)   Resp 18   Ht 5' (1.524 m)   Wt 88.9 kg (196 lb)   SpO2 98%   BMI 38.28 kg/m²     Physical Exam  Vitals and nursing note reviewed.   Constitutional:       General: She is not in acute distress.     Appearance: Normal appearance. She is well-developed.   HENT:      Head: Normocephalic and atraumatic.      Right Ear: There is impacted cerumen.      Left Ear: There is impacted cerumen.      Nose: Congestion present.      Right Turbinates: Swollen.      Left Turbinates: Swollen.      Mouth/Throat:      Mouth:  Mucous membranes are moist.      Pharynx: Oropharynx is clear.   Eyes:      Conjunctiva/sclera: Conjunctivae normal.   Cardiovascular:      Rate and Rhythm: Normal rate and regular rhythm.      Heart sounds: No murmur heard.  Pulmonary:      Effort: Pulmonary effort is normal. No respiratory distress.      Breath sounds: Normal breath sounds.   Abdominal:      Palpations: Abdomen is soft.      Tenderness: There is no abdominal tenderness.   Genitourinary:     Comments: Exam deferred  Musculoskeletal:      Cervical back: Neck supple.   Skin:     General: Skin is warm and dry.      Capillary Refill: Capillary refill takes less than 2 seconds.   Neurological:      Mental Status: She is alert and oriented to person, place, and time.   Psychiatric:         Mood and Affect: Mood normal.

## 2024-11-13 DIAGNOSIS — F33.1 MDD (MAJOR DEPRESSIVE DISORDER), RECURRENT EPISODE, MODERATE (HCC): ICD-10-CM

## 2024-11-13 RX ORDER — QUETIAPINE FUMARATE 50 MG/1
50 TABLET, FILM COATED ORAL DAILY
Qty: 30 TABLET | Refills: 0 | Status: SHIPPED | OUTPATIENT
Start: 2024-11-13

## 2024-11-20 ENCOUNTER — TELEPHONE (OUTPATIENT)
Dept: BEHAVIORAL/MENTAL HEALTH CLINIC | Facility: CLINIC | Age: 34
End: 2024-11-20

## 2024-11-20 ENCOUNTER — TELEMEDICINE (OUTPATIENT)
Dept: PSYCHIATRY | Facility: CLINIC | Age: 34
End: 2024-11-20
Payer: COMMERCIAL

## 2024-11-20 DIAGNOSIS — F43.10 POST TRAUMATIC STRESS DISORDER (PTSD): Primary | ICD-10-CM

## 2024-11-20 DIAGNOSIS — F41.1 GAD (GENERALIZED ANXIETY DISORDER): ICD-10-CM

## 2024-11-20 DIAGNOSIS — F33.1 MDD (MAJOR DEPRESSIVE DISORDER), RECURRENT EPISODE, MODERATE (HCC): ICD-10-CM

## 2024-11-20 PROCEDURE — 99214 OFFICE O/P EST MOD 30 MIN: CPT | Performed by: NURSE PRACTITIONER

## 2024-11-20 NOTE — PSYCH
Virtual Regular Visit    Verification of patient location:    Patient is located at Home in the following state in which I hold an active license [place]      Assessment/Plan:    Problem List Items Addressed This Visit    None        Depression Screening and Follow-up Plan: Patient's depression screening was positive with a PHQ-9 score of 14.         Reason for visit is   Chief Complaint   Patient presents with    Virtual Regular Visit            Encounter provider YOLANDA Liriano      Recent Visits  No visits were found meeting these conditions.  Showing recent visits within past 7 days and meeting all other requirements  Today's Visits  Date Type Provider Dept   24 Telemedicine YOLANDA Liriano  [facility]   Showing today's visits and meeting all other requirements  Future Appointments  No visits were found meeting these conditions.  Showing future appointments within next 150 days and meeting all other requirements       The patient was identified by name and date of birth. Gela Bell was informed that this is a telemedicine visit and that the visit is being conducted throughthe Epic Embedded platform. She agrees to proceed..  My office door was closed. No one else was in the room.  She acknowledged consent and understanding of privacy and security of the video platform. The patient has agreed to participate and understands they can discontinue the visit at any time.    Patient is aware this is a billable service.     HPI     Past Medical History:   Diagnosis Date    Allergic     Anxiety     Depression        Past Surgical History:   Procedure Laterality Date     SECTION N/A        Current Outpatient Medications   Medication Sig Dispense Refill    albuterol (Ventolin HFA) 90 mcg/act inhaler Inhale 2 puffs every 6 (six) hours as needed for wheezing or shortness of breath 18 g 2    aspirin-acetaminophen-caffeine (EXCEDRIN MIGRAINE) 250-250-65 MG per tablet Take 1 tablet  by mouth every 6 (six) hours as needed for headaches      cetirizine (ZyrTEC) 10 mg tablet Take 10 mg by mouth daily      cholecalciferol (VITAMIN D3) 1,000 units tablet take 2 tablets by mouth daily 180 tablet 1    escitalopram (LEXAPRO) 20 mg tablet take 1 tablet by mouth once daily 90 tablet 0    loratadine (CLARITIN) 10 mg tablet take 1 tablet by mouth once daily 90 tablet 1    omeprazole (PriLOSEC) 20 mg delayed release capsule Take 1 capsule (20 mg total) by mouth daily 30 capsule 0    prazosin (MINIPRESS) 1 mg capsule take 1 capsule by mouth at bedtime 90 capsule 0    propranolol (INDERAL LA) 120 mg 24 hr capsule take 1 capsule by mouth once daily 90 capsule 2    QUEtiapine (SEROquel) 50 mg tablet Take 1 tablet (50 mg total) by mouth daily 30 tablet 0    fluticasone (VERAMYST) 27.5 MCG/SPRAY nasal spray 2 sprays into each nostril daily (Patient not taking: Reported on 11/20/2024) 9.1 mL 2     No current facility-administered medications for this visit.        Allergies   Allergen Reactions    Xanax [Alprazolam] Other (See Comments)     Patient reports history of addiction to xanax. Want to ensure she is not prescribed this medication again.       Review of Systems    Video Exam    There were no vitals filed for this visit.    Physical Exam     Visit Time    Visit Start Time: 1300  Visit Stop Time: 1320  Total Visit Duration:  20 minutes    MEDICATION MANAGEMENT NOTE        [facility]    Name and Date of Birth:  Gela Bell 33 y.o. 1990 MRN: [id number]    Date of Visit: November 20, 2024    SUBJECTIVE:    Gela is seen today for a follow up for Major Depressive Disorder, PTSD, and Generalized Anxiety Disorder. She continues to experience significant symptoms since the last visit. Gela seen virtually for medication management follow up. She was last seen by this provider on 8/12/24.  Gela states today that she is not doing as well as she  "had been. She states that her  quit his job due to \"mental reasons\" and they are being evicted because a new owner took over their building. She reports that they need to be out of the building by 12/1/24.  She has limited insight into her situation and states she has no plan currently.  She rates her depression a 6/10, anxiety 8/10.  Denies SI/HI. Denies auditory and visual hallucinations.  She reports she is sleeping.  She states that her depression is \"not so bad (she) isn't able to care for her daughter\".  She was referred to  here for housing resources in [facility].  She will follow up in one month.  No medication changes made at this time.      She denies any side effects from current psychiatric medications.    PLAN:  Continue Lexapro to 20mg PO daily  Seroquel 50mg PO HS  Prazosin 1mg PO HS  Follow up in one month  Therapy referral made previously  Referred to  for housing resources in [facility]  She will call sooner with concerns or issues if they arise prior to scheduled appt    Aware of 24 hour and weekend coverage for urgent situations accessed by calling [facility] main practice number  Referral for individual psychotherapy  Medication management every 4 weeks  Aware of need to follow up with family physician for medical issues    Diagnoses and all orders for this visit:    Post traumatic stress disorder (PTSD)    MDD (major depressive disorder), recurrent episode, moderate (HCC)    DANIELA (generalized anxiety disorder)      Assessment & Plan  Post traumatic stress disorder (PTSD)  Stable on Prazosin 1mg PO HS, Lexapro 20mg PO daily         MDD (major depressive disorder), recurrent episode, moderate (HCC)  Stable on Seroquel 50mg PO HS and Lexapro 20mg PO daily         DANIELA (generalized anxiety disorder)  Stable on Lexapro 20mg PO Daily and Seroquel 50mg PO HS               Psychotherapy Provided:     Individual psychotherapy provided: Yes  Supportive counseling " provided.  Medication changes discussed with Gela.  Medication education provided to Gela.  Discussed with Gela coping with chronic anxiety and housing concerns .   Coping strategies reviewed with Gela.   Importance of medication and treatment compliance reviewed with Gela.  Importance of follow up with family physician for medical issues reviewed with Gela.  Reassurance and supportive therapy provided.   Crisis/safety plan discussed with Gela. Patient will call prior to scheduled appointment if they have any issues or concerns.  Patient understands they can access the office by calling the main number at any time if they are in crisis.  They also understand they can call their [facility]'s crisis number or go to their nearest ED if suicidal ideation increases or if they develop a plan or intent.       HPI ROS Appetite Changes and Sleep:     She reports  increased sleep , normal appetite, normal energy level. Denies homicidal ideation, denies suicidal ideation    Review Of Systems:      HPI ROS:               Medication Side Effects: denies    Depression (10 worst): 6/10 7-8/10   Anxiety (10 worst): 8/10 7-8/10   Safety concerns (SI, HI, etc): denies denies   Sleep: good increased   Energy: adequate adequate   Appetite: adequate adequate     General normal    Personality no change in personality   Constitutional as noted in HPI   ENT negative   Cardiovascular negative   Respiratory negative   Gastrointestinal negative   Genitourinary negative   Musculoskeletal negative   Integumentary negative   Neurological negative   Endocrine negative   Other Symptoms none, all other systems are negative     Mental Status Evaluation:    Appearance Appropriately dressed and Good eye contact   Behavior calm and cooperative   Mood anxious and depressed  Depression Scale - 6 of 10 (0 = No depression)  Anxiety Scale - 8 of 10 (0 = No anxiety)   Speech Normal rate and volume   Affect appropriate and  mood-congruent   Thought Processes Goal directed and coherent   Thought Content Does not verbalize delusional material   Associations Tightly connected   Perceptual Disturbances Denies hallucinations and does not appear to be responding to internal stimuli   Risk Potential Suicidal/Homicidal Ideation - No evidence of suicidal or homicidal ideation and patient does not verbalize suicidal or homicidal ideation  Risk of Violence - No evidence of risk for violence found on assessment  Risk of Self Mutilation - No evidence of risk for self mutilation found on assessment   Orientation oriented to person, place, time/date, and situation   Memory recent and remote memory grossly intact   Consciousness alert and awake   Attention/Concentration attention span and concentration are age appropriate   Insight poor   Judgement poor   Muscle Strength and Gait normal muscle strength and normal muscle tone, normal gait/station and normal balance   Motor Activity no abnormal movements   Language no difficulty naming common objects, no difficulty repeating a phrase, no difficulty writing a sentence   Fund of Knowledge adequate knowledge of current events  adequate fund of knowledge regarding past history  adequate fund of knowledge regarding vocabulary      Past Psychiatric History Update:     Inpatient Psychiatric Admission Since Last Encounter:   no  Changes to Outpatient Psychiatric Treatment Team:    no  Suicide Attempt Or Self Mutilation Since Last Encounter:   no  Incidence of Violent Behavior Since Last Encounter:   no    Traumatic History Update:     New Onset of Abuse Since Last Encounter:   no  Traumatic Events Since Last Encounter:   no    Substance Abuse History:    Social History     Substance and Sexual Activity   Alcohol Use Not Currently    Comment: drinks very rarely 1 glass per occasion     Social History     Substance and Sexual Activity   Drug Use Never     Social History:    Social History     Socioeconomic  History    Marital status: Single     Spouse name: Not on file    Number of children: Not on file    Years of education: Not on file    Highest education level: Not on file   Occupational History    Not on file   Tobacco Use    Smoking status: Never    Smokeless tobacco: Never   Vaping Use    Vaping status: Never Used   Substance and Sexual Activity    Alcohol use: Not Currently     Comment: drinks very rarely 1 glass per occasion    Drug use: Never    Sexual activity: Yes     Partners: Male     Birth control/protection: I.U.D.   Other Topics Concern    Not on file   Social History Narrative    Not on file     Social Drivers of Health     Financial Resource Strain: Not on file   Food Insecurity: Not on file   Transportation Needs: Not on file   Physical Activity: Not on file   Stress: Not on file   Social Connections: Not on file   Intimate Partner Violence: Not on file   Housing Stability: Not on file     Family Psychiatric History:     Family History   Adopted: Yes   Problem Relation Age of Onset    Drug abuse Mother     Drug abuse Father      History Review:The following portions of the patient's history were reviewed and updated as appropriate: allergies, current medications, past family history, past medical history, past social history, past surgical history, and problem list     OBJECTIVE:     Vital signs in last 24 hours:    There were no vitals filed for this visit.  Laboratory Results: I have personally reviewed all pertinent laboratory/tests results.    Suicide/Homicide Risk Assessment:    Risk of Harm to Self:  The following ratings are based on assessment at the time of the interview  Recent Specific Risk Factors include: current anxiety symptoms, worries about finances or work, housing concerns  Demographic risk factors include:   Historical Risk Factors include: chronic anxiety symptoms  Protective Factors: no current suicidal ideation, access to mental health treatment, being a parent,  compliant with medications, compliant with mental health treatment, having a desire to be alive, having a sense of purpose or meaning in life, supportive family  Weapons: none. The following steps have been taken to ensure weapons are properly secured: not applicable  Based on today's assessment, Gela presents the following risk of harm to self: low    Risk of Harm to Others:  The following ratings are based on assessment at the time of the interview  Protective Factors: no current homicidal ideation  Based on today's assessment, Gela presents the following risk of harm to others: none    The following interventions are recommended: contracts for safety at present - agrees to go to ED if feeling unsafe, referral for psychotherapy, return in 1 month for reassessment, contracts for safety at present - agrees to call Crisis Intervention Service if feeling unsafe    Medications Risks/Benefits:      Risks, Benefits And Possible Side Effects Of Medications:    Discussed risks and benefits of treatment with patient including risk of suicidality, serotonin syndrome, increased QTc interval and SIADH related to treatment with antidepressants; Risk of induction of manic symptoms in certain patient populations and risk of parkinsonian symptoms, metabolic syndrome, tardive dyskinesia and neuroleptic malignant syndrome related to treatment with antipsychotic medications     Controlled Medication Discussion:     Not applicable    Treatment Plan:    Due for update/Updated:   no  Last treatment plan done 11/20/24 by YOLANDA Rivera.  Treatment Plan due on 5/20/25.    YOLANDA Liriano 11/20/24    This note was shared with patient.

## 2024-11-20 NOTE — BH TREATMENT PLAN
TREATMENT PLAN (Medication Management Only)        [facility]    Name and Date of Birth:  Gela Bell 33 y.o. 1990  Date of Treatment Plan: November 20, 2024  Diagnosis/Diagnoses:    1. Post traumatic stress disorder (PTSD)    2. MDD (major depressive disorder), recurrent episode, moderate (HCC)    3. DANIELA (generalized anxiety disorder)      Strengths/Personal Resources for Self-Care: supportive family.  Area/Areas of need (in own words): anxiety symptoms  1. Long Term Goal: continue improvement in acceptable anxiety level.  Target Date:6 months - 5/20/2025  Person/Persons responsible for completion of goal: Gela  2.  Short Term Objective (s) - How will we reach this goal?:   A. Provider new recommended medication/dosage changes and/or continue medication(s): continue current medications as prescribed.  B. Think positively.  C. Try not to isoalte self.  Target Date:6 months - 5/20/2025  Person/Persons Responsible for Completion of Goal: Gela  Progress Towards Goals: continuing treatment, improving gradually  Treatment Modality: medication management every 1 month, referral for individual psychotherapy, medication education at every visit  Review due 180 days from date of this plan: 6 months - 5/20/2025  Expected length of service: ongoing treatment  My Physician/PA/NP and I have developed this plan together and I agree to work on the goals and objectives. I understand the treatment goals that were developed for my treatment.

## 2024-11-20 NOTE — TELEPHONE ENCOUNTER
Telephone call to patient with provider, Radha Johnson to provide homeless resources in [facility]. Educated patient on Service Access and Management, Children and Youth Services, and shelters in [facility].    [facility] resource guide e-mailed to patient at [email or web address].

## 2024-11-25 ENCOUNTER — TELEPHONE (OUTPATIENT)
Dept: PSYCHIATRY | Facility: CLINIC | Age: 34
End: 2024-11-25

## 2024-12-03 ENCOUNTER — TELEPHONE (OUTPATIENT)
Dept: PSYCHIATRY | Facility: CLINIC | Age: 34
End: 2024-12-03

## 2024-12-03 NOTE — TELEPHONE ENCOUNTER
Left voicemail informing patient and/or parent/guardian of the Psych Encounter form needing to be signed as a requirement from the insurance company for billing purposes. Patient can access form via Care Team Connect and sign electronically.     Please make patient aware this form must be signed for each visit as a requirement to continue future visits with provider.   Topical Steroids Applications Pregnancy And Lactation Text: Most topical steroids are considered safe to use during pregnancy and lactation.  Any topical steroid applied to the breast or nipple should be washed off before breastfeeding.

## 2024-12-06 ENCOUNTER — TELEPHONE (OUTPATIENT)
Dept: PSYCHIATRY | Facility: CLINIC | Age: 34
End: 2024-12-06

## 2024-12-13 DIAGNOSIS — F33.1 MDD (MAJOR DEPRESSIVE DISORDER), RECURRENT EPISODE, MODERATE (HCC): ICD-10-CM

## 2024-12-13 RX ORDER — QUETIAPINE FUMARATE 50 MG/1
50 TABLET, FILM COATED ORAL DAILY
Qty: 30 TABLET | Refills: 0 | Status: SHIPPED | OUTPATIENT
Start: 2024-12-13 | End: 2024-12-16 | Stop reason: SDUPTHER

## 2024-12-16 ENCOUNTER — TELEMEDICINE (OUTPATIENT)
Dept: PSYCHIATRY | Facility: CLINIC | Age: 34
End: 2024-12-16
Payer: COMMERCIAL

## 2024-12-16 DIAGNOSIS — F33.1 MDD (MAJOR DEPRESSIVE DISORDER), RECURRENT EPISODE, MODERATE (HCC): Primary | ICD-10-CM

## 2024-12-16 DIAGNOSIS — F41.1 GAD (GENERALIZED ANXIETY DISORDER): ICD-10-CM

## 2024-12-16 DIAGNOSIS — F43.10 POST TRAUMATIC STRESS DISORDER (PTSD): ICD-10-CM

## 2024-12-16 PROCEDURE — 99214 OFFICE O/P EST MOD 30 MIN: CPT | Performed by: NURSE PRACTITIONER

## 2024-12-16 RX ORDER — QUETIAPINE FUMARATE 50 MG/1
50 TABLET, FILM COATED ORAL DAILY
Qty: 90 TABLET | Refills: 0 | Status: SHIPPED | OUTPATIENT
Start: 2024-12-16

## 2024-12-16 NOTE — ASSESSMENT & PLAN NOTE
Stable on Lexapro 20mg daily, Prazosin 1mg PO HS, and Seroquel 50mg PO HS  Orders:  •  QUEtiapine (SEROquel) 50 mg tablet; Take 1 tablet (50 mg total) by mouth daily

## 2024-12-16 NOTE — PSYCH
Virtual Regular Visit    Verification of patient location:    Patient is located at Home in the following state in which I hold an active license [place]      Assessment/Plan:    Problem List Items Addressed This Visit     Post traumatic stress disorder (PTSD)    Stable on Lexapro 20mg daily, Prazosin 1mg PO HS, and Seroquel 50mg PO HS               Relevant Medications    QUEtiapine (SEROquel) 50 mg tablet    MDD (major depressive disorder), recurrent episode, moderate (HCC) - Primary    Stable on Lexapro 20mg daily, Prazosin 1mg PO HS, and Seroquel 50mg PO HS  Orders:  •  QUEtiapine (SEROquel) 50 mg tablet; Take 1 tablet (50 mg total) by mouth daily           Relevant Medications    QUEtiapine (SEROquel) 50 mg tablet    DANIELA (generalized anxiety disorder)    Stable on Lexapro 20mg daily, Prazosin 1mg PO HS, and Seroquel 50mg PO HS               Relevant Medications    QUEtiapine (SEROquel) 50 mg tablet         Depression Screening and Follow-up Plan: Patient's depression screening was positive with a PHQ-9 score of 11.         Reason for visit is   Chief Complaint   Patient presents with   • Virtual Regular Visit              Encounter provider YOLANDA Liriano      Recent Visits  No visits were found meeting these conditions.  Showing recent visits within past 7 days and meeting all other requirements  Today's Visits  Date Type Provider Dept   12/16/24 Telemedicine YOLANDA Liriano  [facility]   Showing today's visits and meeting all other requirements  Future Appointments  No visits were found meeting these conditions.  Showing future appointments within next 150 days and meeting all other requirements       The patient was identified by name and date of birth. Gela Bell was informed that this is a telemedicine visit and that the visit is being conducted throughthe Epic Embedded platform. She agrees to proceed..  My office door was closed. No one else was in the room.  She  acknowledged consent and understanding of privacy and security of the video platform. The patient has agreed to participate and understands they can discontinue the visit at any time.    Patient is aware this is a billable service.     HPI     Past Medical History:   Diagnosis Date   • Allergic    • Anxiety    • Depression        Past Surgical History:   Procedure Laterality Date   •  SECTION N/A        Current Outpatient Medications   Medication Sig Dispense Refill   • albuterol (Ventolin HFA) 90 mcg/act inhaler Inhale 2 puffs every 6 (six) hours as needed for wheezing or shortness of breath 18 g 2   • aspirin-acetaminophen-caffeine (EXCEDRIN MIGRAINE) 250-250-65 MG per tablet Take 1 tablet by mouth every 6 (six) hours as needed for headaches     • cetirizine (ZyrTEC) 10 mg tablet Take 10 mg by mouth daily     • cholecalciferol (VITAMIN D3) 1,000 units tablet take 2 tablets by mouth daily 180 tablet 1   • escitalopram (LEXAPRO) 20 mg tablet take 1 tablet by mouth once daily 90 tablet 0   • loratadine (CLARITIN) 10 mg tablet take 1 tablet by mouth once daily 90 tablet 1   • prazosin (MINIPRESS) 1 mg capsule take 1 capsule by mouth at bedtime 90 capsule 0   • propranolol (INDERAL LA) 120 mg 24 hr capsule take 1 capsule by mouth once daily 90 capsule 2   • QUEtiapine (SEROquel) 50 mg tablet Take 1 tablet (50 mg total) by mouth daily 90 tablet 0   • fluticasone (VERAMYST) 27.5 MCG/SPRAY nasal spray 2 sprays into each nostril daily (Patient not taking: Reported on 2024) 9.1 mL 2     No current facility-administered medications for this visit.        Allergies   Allergen Reactions   • Xanax [Alprazolam] Other (See Comments)     Patient reports history of addiction to xanax. Want to ensure she is not prescribed this medication again.       Review of Systems    Video Exam    There were no vitals filed for this visit.    Physical Exam     Visit Time    Visit Start Time: 1300  Visit Stop Time: 131  Total Visit  "Duration:  15 minutes    MEDICATION MANAGEMENT NOTE        [facility]    Name and Date of Birth:  Gela Bell 33 y.o. 1990 MRN: [id number]    Date of Visit: December 16, 2024    SUBJECTIVE:    Gela is seen today for a follow up for Major Depressive Disorder, PTSD, and Generalized Anxiety Disorder. She continues to experience significant symptoms since the last visit. Gela seen virtually for medication management follow up. She was last seen by this provider on 11/20/24.  Gela states that she is doing \"a little better than last month\" because they came to agreement with the landlord to do month to month rent and they are not being evicted at this time. She rates her depression 5-6/10, anxiety 6/10. She denies SI/HI. Denies auditory and visual hallucinations. She states that she is sleeping \"probably too much\" and has an adequate appetite. She is calm and appropriate in conversation. She will continue her medications as ordered and follow up next month.        She denies any side effects from current psychiatric medications.    PLAN:  Continue Lexapro to 20mg PO daily  Seroquel 50mg PO HS  Prazosin 1mg PO HS  Follow up in one month  Therapy referral made previously  She will call sooner with concerns or issues if they arise prior to scheduled appt    Aware of 24 hour and weekend coverage for urgent situations accessed by calling [facility] main practice number  Referral for individual psychotherapy  Medication management every 4 weeks  Aware of need to follow up with family physician for medical issues    Diagnoses and all orders for this visit:    MDD (major depressive disorder), recurrent episode, moderate (HCC)  -     QUEtiapine (SEROquel) 50 mg tablet; Take 1 tablet (50 mg total) by mouth daily    DANIELA (generalized anxiety disorder)    Post traumatic stress disorder (PTSD)      Assessment & Plan  MDD (major depressive " disorder), recurrent episode, moderate (HCC)  Stable on Lexapro 20mg daily, Prazosin 1mg PO HS, and Seroquel 50mg PO HS  Orders:  •  QUEtiapine (SEROquel) 50 mg tablet; Take 1 tablet (50 mg total) by mouth daily    DANIELA (generalized anxiety disorder)  Stable on Lexapro 20mg daily, Prazosin 1mg PO HS, and Seroquel 50mg PO HS        Post traumatic stress disorder (PTSD)  Stable on Lexapro 20mg daily, Prazosin 1mg PO HS, and Seroquel 50mg PO HS              Psychotherapy Provided:     Individual psychotherapy provided: Yes  Supportive counseling provided.  Medication changes discussed with Gela.  Medication education provided to Gela.  Discussed with Gela coping with chronic anxiety and housing concerns .   Coping strategies reviewed with Gela.   Importance of medication and treatment compliance reviewed with Gela.  Importance of follow up with family physician for medical issues reviewed with Gela.  Reassurance and supportive therapy provided.   Crisis/safety plan discussed with Gela. Patient will call prior to scheduled appointment if they have any issues or concerns.  Patient understands they can access the office by calling the main number at any time if they are in crisis.  They also understand they can call their county's crisis number or go to their nearest ED if suicidal ideation increases or if they develop a plan or intent.       HPI ROS Appetite Changes and Sleep:     She reports  increased sleep , normal appetite, normal energy level. Denies homicidal ideation, denies suicidal ideation    Review Of Systems:      HPI ROS:               Medication Side Effects:  denies   Depression (10 worst): 5-6/10 6/10   Anxiety (10 worst): 6/10 8/10   Safety concerns (SI, HI, etc): denies denies   Sleep: increased good   Energy: good adequate   Appetite: good adequate     General normal    Personality no change in personality   Constitutional as noted in HPI   ENT negative   Cardiovascular  negative   Respiratory negative   Gastrointestinal negative   Genitourinary negative   Musculoskeletal negative   Integumentary negative   Neurological negative   Endocrine negative   Other Symptoms none, all other systems are negative     Mental Status Evaluation:    Appearance Appropriately dressed and Good eye contact   Behavior calm and cooperative   Mood anxious and depressed  Depression Scale - 6 of 10 (0 = No depression)  Anxiety Scale - 8 of 10 (0 = No anxiety)   Speech Normal rate and volume   Affect appropriate and mood-congruent   Thought Processes Goal directed and coherent   Thought Content Does not verbalize delusional material   Associations Tightly connected   Perceptual Disturbances Denies hallucinations and does not appear to be responding to internal stimuli   Risk Potential Suicidal/Homicidal Ideation - No evidence of suicidal or homicidal ideation and patient does not verbalize suicidal or homicidal ideation  Risk of Violence - No evidence of risk for violence found on assessment  Risk of Self Mutilation - No evidence of risk for self mutilation found on assessment   Orientation oriented to person, place, time/date, and situation   Memory recent and remote memory grossly intact   Consciousness alert and awake   Attention/Concentration attention span and concentration are age appropriate   Insight poor   Judgement poor   Muscle Strength and Gait normal muscle strength and normal muscle tone, normal gait/station and normal balance   Motor Activity no abnormal movements   Language no difficulty naming common objects, no difficulty repeating a phrase, no difficulty writing a sentence   Fund of Knowledge adequate knowledge of current events  adequate fund of knowledge regarding past history  adequate fund of knowledge regarding vocabulary      Past Psychiatric History Update:     Inpatient Psychiatric Admission Since Last Encounter:   no  Changes to Outpatient Psychiatric Treatment Team:     no  Suicide Attempt Or Self Mutilation Since Last Encounter:   no  Incidence of Violent Behavior Since Last Encounter:   no    Traumatic History Update:     New Onset of Abuse Since Last Encounter:   no  Traumatic Events Since Last Encounter:   no    Substance Abuse History:    Social History     Substance and Sexual Activity   Alcohol Use Not Currently    Comment: drinks very rarely 1 glass per occasion     Social History     Substance and Sexual Activity   Drug Use Never     Social History:    Social History     Socioeconomic History   • Marital status: Single     Spouse name: Not on file   • Number of children: Not on file   • Years of education: Not on file   • Highest education level: Not on file   Occupational History   • Not on file   Tobacco Use   • Smoking status: Never   • Smokeless tobacco: Never   Vaping Use   • Vaping status: Never Used   Substance and Sexual Activity   • Alcohol use: Not Currently     Comment: drinks very rarely 1 glass per occasion   • Drug use: Never   • Sexual activity: Yes     Partners: Male     Birth control/protection: I.U.D.   Other Topics Concern   • Not on file   Social History Narrative   • Not on file     Social Drivers of Health     Financial Resource Strain: Not on file   Food Insecurity: Not on file   Transportation Needs: Not on file   Physical Activity: Not on file   Stress: Not on file   Social Connections: Not on file   Intimate Partner Violence: Not on file   Housing Stability: Not on file     Family Psychiatric History:     Family History   Adopted: Yes   Problem Relation Age of Onset   • Drug abuse Mother    • Drug abuse Father      History Review:The following portions of the patient's history were reviewed and updated as appropriate: allergies, current medications, past family history, past medical history, past social history, past surgical history, and problem list     OBJECTIVE:     Vital signs in last 24 hours:    There were no vitals filed for this  visit.  Laboratory Results: I have personally reviewed all pertinent laboratory/tests results.    Suicide/Homicide Risk Assessment:    Risk of Harm to Self:  The following ratings are based on assessment at the time of the interview  Recent Specific Risk Factors include: current anxiety symptoms, worries about finances or work, housing concerns  Demographic risk factors include:   Historical Risk Factors include: chronic anxiety symptoms  Protective Factors: no current suicidal ideation, access to mental health treatment, being a parent, compliant with medications, compliant with mental health treatment, having a desire to be alive, having a sense of purpose or meaning in life, supportive family  Weapons: none. The following steps have been taken to ensure weapons are properly secured: not applicable  Based on today's assessment, Gela presents the following risk of harm to self: low    Risk of Harm to Others:  The following ratings are based on assessment at the time of the interview  Protective Factors: no current homicidal ideation  Based on today's assessment, Gela presents the following risk of harm to others: none    The following interventions are recommended: contracts for safety at present - agrees to go to ED if feeling unsafe, referral for psychotherapy, return in 1 month for reassessment, contracts for safety at present - agrees to call Crisis Intervention Service if feeling unsafe    Medications Risks/Benefits:      Risks, Benefits And Possible Side Effects Of Medications:    Discussed risks and benefits of treatment with patient including risk of suicidality, serotonin syndrome, increased QTc interval and SIADH related to treatment with antidepressants; Risk of induction of manic symptoms in certain patient populations and risk of parkinsonian symptoms, metabolic syndrome, tardive dyskinesia and neuroleptic malignant syndrome related to treatment with antipsychotic medications      Controlled Medication Discussion:     Not applicable    Treatment Plan:    Due for update/Updated:   no  Last treatment plan done 11/20/24 by YOLANDA Rivera.  Treatment Plan due on 5/20/25.    YOLANDA Liriano 12/16/24    This note was not shared with the patient due to this is a psychotherapy note

## 2025-01-05 ENCOUNTER — OFFICE VISIT (OUTPATIENT)
Dept: URGENT CARE | Facility: MEDICAL CENTER | Age: 35
End: 2025-01-05
Payer: COMMERCIAL

## 2025-01-05 VITALS
TEMPERATURE: 97.3 F | BODY MASS INDEX: 40.23 KG/M2 | SYSTOLIC BLOOD PRESSURE: 108 MMHG | DIASTOLIC BLOOD PRESSURE: 78 MMHG | OXYGEN SATURATION: 98 % | RESPIRATION RATE: 20 BRPM | WEIGHT: 206 LBS | HEART RATE: 86 BPM

## 2025-01-05 DIAGNOSIS — J06.9 URI WITH COUGH AND CONGESTION: ICD-10-CM

## 2025-01-05 DIAGNOSIS — J02.9 SORE THROAT: Primary | ICD-10-CM

## 2025-01-05 LAB — S PYO AG THROAT QL: NEGATIVE

## 2025-01-05 PROCEDURE — 99213 OFFICE O/P EST LOW 20 MIN: CPT

## 2025-01-05 PROCEDURE — 87880 STREP A ASSAY W/OPTIC: CPT

## 2025-01-05 PROCEDURE — 87070 CULTURE OTHR SPECIMN AEROBIC: CPT

## 2025-01-05 RX ORDER — BENZONATATE 100 MG/1
100 CAPSULE ORAL 3 TIMES DAILY PRN
Qty: 20 CAPSULE | Refills: 0 | Status: SHIPPED | OUTPATIENT
Start: 2025-01-05

## 2025-01-05 RX ORDER — AZELASTINE 1 MG/ML
1 SPRAY, METERED NASAL 2 TIMES DAILY
Qty: 1 ML | Refills: 0 | Status: SHIPPED | OUTPATIENT
Start: 2025-01-05

## 2025-01-05 RX ORDER — GUAIFENESIN 600 MG/1
1200 TABLET, EXTENDED RELEASE ORAL EVERY 12 HOURS SCHEDULED
Qty: 40 TABLET | Refills: 0 | Status: SHIPPED | OUTPATIENT
Start: 2025-01-05

## 2025-01-05 NOTE — PROGRESS NOTES
[facility]        NAME: Gela Bell is a 34 y.o. female  : 1990    MRN: [id number]  DATE: 2025  TIME: 9:52 AM    Assessment and Plan   Sore throat [J02.9]  1. Sore throat  POCT rapid ANTIGEN strepA    Throat culture    Throat culture      2. URI with cough and congestion  guaiFENesin (MUCINEX) 600 mg 12 hr tablet    benzonatate (TESSALON PERLES) 100 mg capsule    azelastine (ASTELIN) 0.1 % nasal spray            Patient Instructions       Follow up with PCP in 3-5 days.  Proceed to  ER if symptoms worsen.    If tests are performed, our office will contact you with results only if changes need to made to the care plan discussed with you at the visit. You can review your full results on [facility].    Chief Complaint     Chief Complaint   Patient presents with   • Cough     Sx started 1 week ago. Feels like it is in her chest. Throat is scratchy from the cough. Went away on Thursday and came back on Friday. Unable to sleep at all last night. Only able to take cough and cold medicine. Sob at times. No fevers or n/v/d.    • Sore Throat         History of Present Illness       Patient here with cough x1 week. She has also had sore throat/scratchy throat. Has been having trouble speaking. Had improvement on Thursday. Cough is no longer productive. She has not had any fever/chills/n/v/d. Has had fatigue and some SOB at times. At home taking OTC cough and cold medicines Q4hrs on and off since . She was running out and could not afford more. Significant other and child also being seen for similar.       Review of Systems   Review of Systems   Constitutional:  Negative for chills, fatigue and fever.   HENT:  Positive for congestion, postnasal drip, rhinorrhea and sore throat. Negative for ear pain and trouble swallowing.    Respiratory:  Positive for cough and chest tightness. Negative for shortness of breath.    Cardiovascular:  Positive for chest pain. Negative for  palpitations.        Chest discomfort with coughing.    Gastrointestinal:  Negative for abdominal pain, constipation, diarrhea, nausea and vomiting.   Musculoskeletal:  Negative for arthralgias and back pain.   Skin:  Negative for color change and rash.   Neurological:  Positive for dizziness. Negative for light-headedness and headaches.   All other systems reviewed and are negative.        Current Medications       Current Outpatient Medications:   •  albuterol (Ventolin HFA) 90 mcg/act inhaler, Inhale 2 puffs every 6 (six) hours as needed for wheezing or shortness of breath, Disp: 18 g, Rfl: 2  •  aspirin-acetaminophen-caffeine (EXCEDRIN MIGRAINE) 250-250-65 MG per tablet, Take 1 tablet by mouth every 6 (six) hours as needed for headaches, Disp: , Rfl:   •  azelastine (ASTELIN) 0.1 % nasal spray, 1 spray into each nostril 2 (two) times a day Use in each nostril as directed, Disp: 1 mL, Rfl: 0  •  benzonatate (TESSALON PERLES) 100 mg capsule, Take 1 capsule (100 mg total) by mouth 3 (three) times a day as needed for cough, Disp: 20 capsule, Rfl: 0  •  cetirizine (ZyrTEC) 10 mg tablet, Take 10 mg by mouth daily, Disp: , Rfl:   •  cholecalciferol (VITAMIN D3) 1,000 units tablet, take 2 tablets by mouth daily, Disp: 180 tablet, Rfl: 1  •  escitalopram (LEXAPRO) 20 mg tablet, take 1 tablet by mouth once daily, Disp: 90 tablet, Rfl: 0  •  guaiFENesin (MUCINEX) 600 mg 12 hr tablet, Take 2 tablets (1,200 mg total) by mouth every 12 (twelve) hours, Disp: 40 tablet, Rfl: 0  •  loratadine (CLARITIN) 10 mg tablet, take 1 tablet by mouth once daily, Disp: 90 tablet, Rfl: 1  •  prazosin (MINIPRESS) 1 mg capsule, take 1 capsule by mouth at bedtime, Disp: 90 capsule, Rfl: 0  •  propranolol (INDERAL LA) 120 mg 24 hr capsule, take 1 capsule by mouth once daily, Disp: 90 capsule, Rfl: 2  •  QUEtiapine (SEROquel) 50 mg tablet, Take 1 tablet (50 mg total) by mouth daily, Disp: 90 tablet, Rfl: 0  •  fluticasone (VERAMYST) 27.5  MCG/SPRAY nasal spray, 2 sprays into each nostril daily (Patient not taking: Reported on 2024), Disp: 9.1 mL, Rfl: 2    Current Allergies     Allergies as of 2025 - Reviewed 2025   Allergen Reaction Noted   • Xanax [alprazolam] Other (See Comments) 2023            The following portions of the patient's history were reviewed and updated as appropriate: allergies, current medications, past family history, past medical history, past social history, past surgical history and problem list.     Past Medical History:   Diagnosis Date   • Allergic    • Anxiety    • Depression        Past Surgical History:   Procedure Laterality Date   •  SECTION N/A        Family History   Adopted: Yes   Problem Relation Age of Onset   • Drug abuse Mother    • Drug abuse Father          Medications have been verified.        Objective   /78   Pulse 86   Temp (!) 97.3 °F (36.3 °C)   Resp 20   Wt 93.4 kg (206 lb)   SpO2 98%   BMI 40.23 kg/m²        Physical Exam     Physical Exam  Vitals and nursing note reviewed.   Constitutional:       General: She is not in acute distress.     Appearance: Normal appearance. She is obese. She is not ill-appearing.   HENT:      Head: Normocephalic and atraumatic.      Right Ear: Ear canal and external ear normal. A middle ear effusion is present. Tympanic membrane is not erythematous or bulging.      Left Ear: Ear canal and external ear normal. A middle ear effusion is present. Tympanic membrane is not erythematous or bulging.      Nose: Rhinorrhea present. Rhinorrhea is clear.      Mouth/Throat:      Lips: Pink.      Mouth: Mucous membranes are moist.      Pharynx: Oropharynx is clear. Uvula midline. Posterior oropharyngeal erythema present.      Tonsils: Tonsillar exudate present. 1+ on the right. 1+ on the left.   Eyes:      Extraocular Movements: Extraocular movements intact.      Conjunctiva/sclera: Conjunctivae normal.      Pupils: Pupils are equal, round, and  reactive to light.   Cardiovascular:      Rate and Rhythm: Normal rate and regular rhythm.      Pulses: Normal pulses.      Heart sounds: Normal heart sounds.   Pulmonary:      Effort: Pulmonary effort is normal.      Breath sounds: Normal breath sounds. No decreased breath sounds, wheezing or rhonchi.   Abdominal:      General: Abdomen is flat. Bowel sounds are normal.      Palpations: Abdomen is soft.   Musculoskeletal:         General: Normal range of motion.      Cervical back: Full passive range of motion without pain, normal range of motion and neck supple.   Lymphadenopathy:      Cervical: Cervical adenopathy present.   Skin:     General: Skin is warm and dry.      Capillary Refill: Capillary refill takes less than 2 seconds.      Findings: No rash.   Neurological:      General: No focal deficit present.      Mental Status: She is alert and oriented to person, place, and time.   Psychiatric:         Mood and Affect: Mood normal.         Behavior: Behavior normal.

## 2025-01-05 NOTE — PATIENT INSTRUCTIONS
"You may take over the counter Tylenol (Acetaminophen) and/or Motrin (Ibuprofen) as needed, as directed on packaging.   Be sure to get plenty of rest, and drinking fluids to remain hydrated.     Please follow up with your primary provider in the next several days. Should you have any worsening of symptoms, or lack of improvement please be re-evaluated. If needed for significant concerns, consider 911 or ER evaluation.     Pt appears to have a viral upper respiratory infection. Antibiotics are contraindicated at this time and would not help you feel better faster.      Although the symptoms are troublesome, usually the patient's body is able to recover from a viral infection on an average time of 7-10 days.  Fever, if any, typically resolves after 3-5 days.  If patient has sore throat, typically this resolves within 3-5 days.  Any nasal congestion, runny nose, post nasal drip typically begin to  improve after 7-10 days.  Any cough may linger over a couple weeks.  Please note that having a cough is not necessarily a bad thing.  It often times is part of our body's protective mechanism to help keep our airways clear.       Please note that yellow mucous doesn't necessarily mean a \"bacterial\" infection.  Yellow mucous doesn't automatically mean that an antibiotic is needed.  It is not unusual for mucus to become more discolored in the days after the start of an upper respiratory infection.  Often times this is due to mucous that has thickened  with white blood cells that have flooded the mucosa to try and fight the viral infection.       Ear Pain may occur when the eustachian tubes become blocked with mucous or swollen due to acute inflammation from illness. May give Ibuprofen or Tylenol as needed for comfort.  May also use warm compress against ear for comfort.  If ear ache is persisting and not improving over 2-3 days or if there is any gross drainage coming from ear, please seek further evaluation.       Natural " remedies to help provide comfort for cough/ cold symptoms include: one teaspoon of honey (only in infants over 1 year of age), increased vitamin C (oranges, harsh, etc.), shanti, and drinking plenty of fluids. Vaporizer by bedside.     If you should have prolonged symptoms (Greater than 10 days), worsening symptoms, or any new symptoms please seek further medical attention.

## 2025-01-06 ENCOUNTER — OFFICE VISIT (OUTPATIENT)
Dept: FAMILY MEDICINE CLINIC | Facility: CLINIC | Age: 35
End: 2025-01-06
Payer: COMMERCIAL

## 2025-01-06 VITALS
HEART RATE: 119 BPM | RESPIRATION RATE: 18 BRPM | OXYGEN SATURATION: 96 % | TEMPERATURE: 98.6 F | BODY MASS INDEX: 40.31 KG/M2 | DIASTOLIC BLOOD PRESSURE: 76 MMHG | SYSTOLIC BLOOD PRESSURE: 110 MMHG | WEIGHT: 206.4 LBS

## 2025-01-06 DIAGNOSIS — R43.0 LOSS OF SENSE OF SMELL: ICD-10-CM

## 2025-01-06 DIAGNOSIS — R05.1 ACUTE COUGH: Primary | ICD-10-CM

## 2025-01-06 DIAGNOSIS — H65.03 NON-RECURRENT ACUTE SEROUS OTITIS MEDIA OF BOTH EARS: ICD-10-CM

## 2025-01-06 DIAGNOSIS — J40 BRONCHITIS: ICD-10-CM

## 2025-01-06 DIAGNOSIS — R05.1 ACUTE COUGH: ICD-10-CM

## 2025-01-06 LAB
SARS-COV-2 AG UPPER RESP QL IA: NEGATIVE
VALID CONTROL: NORMAL

## 2025-01-06 PROCEDURE — 99213 OFFICE O/P EST LOW 20 MIN: CPT | Performed by: NURSE PRACTITIONER

## 2025-01-06 PROCEDURE — T1015 CLINIC SERVICE: HCPCS | Performed by: FAMILY MEDICINE

## 2025-01-06 RX ORDER — METHYLPREDNISOLONE 4 MG/1
TABLET ORAL
Qty: 21 EACH | Refills: 0 | Status: SHIPPED | OUTPATIENT
Start: 2025-01-06

## 2025-01-06 RX ORDER — ALBUTEROL SULFATE 90 UG/1
2 INHALANT RESPIRATORY (INHALATION) EVERY 6 HOURS PRN
Qty: 18 G | Refills: 2 | Status: SHIPPED | OUTPATIENT
Start: 2025-01-06

## 2025-01-06 NOTE — PROGRESS NOTES
Name: Gela Bell      : 1990      MRN: [id number]  Encounter Provider: YOLANDA Henao  Encounter Date: 2025   Encounter department: [facility]  :  Assessment & Plan  Bronchitis  Steroids and inhaler as prescribed  Supportive care and alarm s/s for RTC rev'd  Orders:    methylPREDNISolone 4 MG tablet therapy pack; Use as directed on package    albuterol (Ventolin HFA) 90 mcg/act inhaler; Inhale 2 puffs every 6 (six) hours as needed for wheezing or shortness of breath    Non-recurrent acute serous otitis media of both ears  Steroids as prescribed  Supportive care and alarm s/s for RTC rev'd    Orders:    methylPREDNISolone 4 MG tablet therapy pack; Use as directed on package    Loss of sense of smell  COVID test negative; most like due to nasal congestion  Orders:    POCT Rapid Covid Ag    Acute cough    Orders:    POCT Rapid Covid Ag          Depression Screening and Follow-up Plan:   Patient's depression screening was negative with an Bim  Depression Scale score of  .     History of Present Illness     PMH anxiety, mood disorder (follows w/ PMHNP), vitamin D deficiency, and chronic migraine presents for GERD follow up. PPI reduced at last OV. Patient endorses continued reflux - maybe once a week - but reduced symptoms. Improved with avoiding triggering foods.    Cough  This is a new problem. The current episode started in the past 7 days. The problem has been gradually worsening. The problem occurs every few minutes. The cough is Non-productive. Associated symptoms include chills, headaches, nasal congestion, postnasal drip, rhinorrhea, a sore throat and shortness of breath. Pertinent negatives include no chest pain. She has tried rest and OTC cough suppressant for the symptoms. The treatment provided mild relief.     Review of Systems   Constitutional:  Positive for activity change, appetite change, chills and fatigue.   HENT:  Positive  for congestion, postnasal drip, rhinorrhea, sinus pressure, sinus pain, sneezing, sore throat and voice change.    Respiratory:  Positive for cough and shortness of breath.    Cardiovascular:  Negative for chest pain.   Gastrointestinal:  Negative for abdominal pain, diarrhea and nausea.   Genitourinary:  Negative for difficulty urinating.   Neurological:  Positive for headaches. Negative for dizziness.       Objective   /76 (BP Location: Left arm)   Pulse (!) 119   Temp 98.6 °F (37 °C)   Resp 18   Wt 93.6 kg (206 lb 6.4 oz)   SpO2 96%   BMI 40.31 kg/m²      Physical Exam  Vitals and nursing note reviewed.   Constitutional:       General: She is not in acute distress.     Appearance: She is well-developed. She is obese.   HENT:      Head: Normocephalic and atraumatic.      Right Ear: Ear canal and external ear normal. A middle ear effusion is present. Tympanic membrane is not erythematous or bulging.      Left Ear: Ear canal and external ear normal. A middle ear effusion is present. Tympanic membrane is not erythematous or bulging.      Nose: Congestion present.      Right Turbinates: Swollen.      Left Turbinates: Swollen.      Right Sinus: Maxillary sinus tenderness and frontal sinus tenderness present.      Left Sinus: Maxillary sinus tenderness and frontal sinus tenderness present.      Mouth/Throat:      Mouth: Mucous membranes are moist.      Pharynx: Oropharynx is clear. Posterior oropharyngeal erythema present. No oropharyngeal exudate.   Eyes:      Conjunctiva/sclera: Conjunctivae normal.   Cardiovascular:      Rate and Rhythm: Regular rhythm. Tachycardia present.      Heart sounds: No murmur heard.  Pulmonary:      Effort: Pulmonary effort is normal. No respiratory distress.      Breath sounds: Wheezing present.   Abdominal:      Palpations: Abdomen is soft.      Tenderness: There is no abdominal tenderness.   Lymphadenopathy:      Cervical: No cervical adenopathy.   Skin:     General: Skin is  warm and dry.      Capillary Refill: Capillary refill takes less than 2 seconds.   Neurological:      Mental Status: She is alert and oriented to person, place, and time.   Psychiatric:         Mood and Affect: Mood normal.

## 2025-01-07 ENCOUNTER — RESULTS FOLLOW-UP (OUTPATIENT)
Dept: URGENT CARE | Facility: MEDICAL CENTER | Age: 35
End: 2025-01-07

## 2025-01-07 LAB — BACTERIA THROAT CULT: NORMAL

## 2025-01-27 DIAGNOSIS — J06.9 URI WITH COUGH AND CONGESTION: ICD-10-CM

## 2025-01-28 RX ORDER — AZELASTINE HYDROCHLORIDE 137 UG/1
1 SPRAY, METERED NASAL 2 TIMES DAILY
Qty: 30 ML | OUTPATIENT
Start: 2025-01-28

## 2025-02-01 DIAGNOSIS — J30.9 ALLERGIC RHINITIS, UNSPECIFIED SEASONALITY, UNSPECIFIED TRIGGER: ICD-10-CM

## 2025-02-01 DIAGNOSIS — F33.1 MDD (MAJOR DEPRESSIVE DISORDER), RECURRENT EPISODE, MODERATE (HCC): ICD-10-CM

## 2025-02-01 DIAGNOSIS — F41.1 GAD (GENERALIZED ANXIETY DISORDER): ICD-10-CM

## 2025-02-01 DIAGNOSIS — F43.10 POST TRAUMATIC STRESS DISORDER (PTSD): ICD-10-CM

## 2025-02-03 RX ORDER — LORATADINE 10 MG/1
10 TABLET ORAL DAILY
Qty: 90 TABLET | Refills: 1 | Status: SHIPPED | OUTPATIENT
Start: 2025-02-03

## 2025-02-03 RX ORDER — ESCITALOPRAM OXALATE 20 MG/1
20 TABLET ORAL DAILY
Qty: 90 TABLET | Refills: 0 | Status: SHIPPED | OUTPATIENT
Start: 2025-02-03

## 2025-02-03 RX ORDER — PRAZOSIN HYDROCHLORIDE 1 MG/1
1 CAPSULE ORAL
Qty: 90 CAPSULE | Refills: 0 | Status: SHIPPED | OUTPATIENT
Start: 2025-02-03

## 2025-03-08 DIAGNOSIS — J40 BRONCHITIS: ICD-10-CM

## 2025-03-10 DIAGNOSIS — J40 BRONCHITIS: ICD-10-CM

## 2025-03-10 RX ORDER — ALBUTEROL SULFATE 90 UG/1
2 INHALANT RESPIRATORY (INHALATION) EVERY 6 HOURS PRN
Qty: 18 G | Refills: 2 | Status: SHIPPED | OUTPATIENT
Start: 2025-03-10

## 2025-03-10 RX ORDER — ALBUTEROL SULFATE 90 UG/1
INHALANT RESPIRATORY (INHALATION)
Qty: 8.5 G | OUTPATIENT
Start: 2025-03-10

## 2025-03-13 ENCOUNTER — OFFICE VISIT (OUTPATIENT)
Dept: SLEEP CENTER | Facility: CLINIC | Age: 35
End: 2025-03-13
Payer: COMMERCIAL

## 2025-03-13 VITALS
DIASTOLIC BLOOD PRESSURE: 66 MMHG | HEIGHT: 60 IN | SYSTOLIC BLOOD PRESSURE: 100 MMHG | HEART RATE: 109 BPM | RESPIRATION RATE: 16 BRPM | BODY MASS INDEX: 39.07 KG/M2 | WEIGHT: 199 LBS | TEMPERATURE: 98 F

## 2025-03-13 DIAGNOSIS — G47.19 EXCESSIVE DAYTIME SLEEPINESS: Primary | ICD-10-CM

## 2025-03-13 DIAGNOSIS — F51.04 CHRONIC INSOMNIA: ICD-10-CM

## 2025-03-13 PROCEDURE — 99243 OFF/OP CNSLTJ NEW/EST LOW 30: CPT | Performed by: STUDENT IN AN ORGANIZED HEALTH CARE EDUCATION/TRAINING PROGRAM

## 2025-03-13 NOTE — PATIENT INSTRUCTIONS
MADONNA Evaluation:    I suspect you may have sleep apnea.  Sleep apnea is a serious sleep disorder that occurs when a person's breathing is interrupted during sleep. People with untreated sleep apnea stop breathing repeatedly during their sleep, sometimes hundreds of times during the night.  The most common type of sleep apnea is obstructive sleep apnea.  If left untreated, obstructive sleep apnea can result in a number of health problems including hypertension, stroke, arrhythmias, cardiomyopathy (enlargement of the muscle tissue of the heart), heart failure, diabetes, obesity, and heart attacks. It has also been found to make chronic medical conditions (such as high blood pressure, migraine headaches, and seizures (more difficult to manage.  Treatment options for obstructive sleep apnea may include positive airway pressure (PAP) therapy, oral appliance, hypoglossal nerve stimulator, nose/throat surgery, weight loss, side sleeping, or avoidance of medications or substances that can relax the airway muscles (alcohol, benzodiazepines, and opioids).  I have ordered an in-lab polysomnogram (PSG) to evaluate for sleep apnea.  This test should be scheduled at your earliest convenience.   Sleep Clinic: This should be scheduled at the  before you leave today.  Avoid driving if drowsy. We recommend that if you are dozing off while driving, that you do not drive until your sleepiness is appropriately treated.  We encourage a healthy lifestyle with adequate sleep (7-9 hours per night), diet and exercise.  Recommend good sleep hygiene, as outlined below    Myself and/or my team will reach out to you via MyChart and/or phone call with the results and next steps.      Good Sleep Hygiene  Wake up at the same time every day, even on the weekends.  Use your bed for sleep and intimacy only.  If you have been in bed awake for 30 minutes, get up and leave the bedroom. Choose a dull activity not involving a blue screen (TV,  computer, handheld devices). Go back to bed when you feel sleepy.  Avoid caffeine, nicotine and alcohol before you go to bed.  Avoid large meals before you go to bed.  Avoid using screens (computers, tablets, smartphones, etc.) for at least 1 hour before bedtime  Exercise regularly, but do not exercise right before you go to bed.  Avoid daytime naps. If you do take a nap, sleep for 20-40 minutes, and not after 2pm.

## 2025-03-13 NOTE — ASSESSMENT & PLAN NOTE
Gela is a pleasant 34-year-old woman with PMHx of chronic migraine, PTSD, MDD, DANIELA, mood disorder (no manic episodes in the past that she is aware of), vitamin D deficiency who presents for generally poor sleep.  Certainly, a significant number of her symptoms and concerns sound consistent with sleep disordered breathing, thus meriting evaluation for and treatment of this if present a necessary first step in her management.    Discussed with patient the pathophysiology of OSAS and medical conditions associated with OSAS such as DM, HTN, CAD, Depression, Stroke, Headache.  Treatment options including surgery, Dental appliances, positional therapy, and CPAP were discussed.  I have ordered an in-lab polysomnogram (PSG) to evaluate for sleep disordered breathing .  Advised patient to avoid activities that could harm self or others when tired/sleepy, including driving.  Discussed importance of good sleep hygiene.  I will reach out to the patient via MyChart with the results of the sleep study and next steps.    Orders:    Ambulatory Referral to Sleep Medicine    Diagnostic Sleep Study; Future    Ambulatory referral to Weight Management; Future

## 2025-03-13 NOTE — PROGRESS NOTES
Name: Gela Bell      : 1990      MRN: [id number]  Encounter Provider: Misael Suarez DO  Encounter Date: 3/13/2025   Encounter department: [facility] [place]  :  CONSULTING PROVIDER:  YOLANDA Henao  [address] [place] [address]    Assessment & Plan  Excessive daytime sleepiness  Gela is a pleasant 34-year-old woman with PMHx of chronic migraine, PTSD, MDD, DANIELA, mood disorder (no manic episodes in the past that she is aware of), vitamin D deficiency who presents for generally poor sleep.  Certainly, a significant number of her symptoms and concerns sound consistent with sleep disordered breathing, thus meriting evaluation for and treatment of this if present a necessary first step in her management.    Discussed with patient the pathophysiology of OSAS and medical conditions associated with OSAS such as DM, HTN, CAD, Depression, Stroke, Headache.  Treatment options including surgery, Dental appliances, positional therapy, and CPAP were discussed.  I have ordered an in-lab polysomnogram (PSG) to evaluate for sleep disordered breathing .  Advised patient to avoid activities that could harm self or others when tired/sleepy, including driving.  Discussed importance of good sleep hygiene.  I will reach out to the patient via MyChart with the results of the sleep study and next steps.    Orders:    Ambulatory Referral to Sleep Medicine    Diagnostic Sleep Study; Future    Ambulatory referral to Weight Management; Future    Chronic insomnia  She also sounds to have a degree of a sleep onset difficulty, however at this stage it is somewhat difficult to ascertain whether this is a true chronic insomnia versus a circadian rhythm disorder.    Discussed the pathophysiology behind chronic insomnia, including the 3-P model  Discussed that CBT-I is first-line for treatment of chronic insomnia, and has the best data supporting its efficacy  Reviewed the combination of  "sleep restriction along with stimulus control  We will consider a formal Cognitive Behavioral Therapy for Insomnia (CBT-I) at a future appointment.    More specifically, given her diagnosis of a nonspecific \"mood disorder,\" I would like to reach out to her psychiatry team to determine whether they believe she would be an appropriate candidate for CBT-I if this is deemed necessary  She did give me permission today to discuss this with her psychiatry team  Recommended that she utilize good sleep hygiene as a bedrock for CBT-I to build upon      Orders:    Ambulatory Referral to Sleep Medicine    BMI 37.0-37.9, adult    Discussed the importance of maintaining a healthy weight on SDB control/management, and vice versa.  Encouraged lifestyle practices to maintain a healthy weight (including diet, exercise).  Referral placed to Weight Management    Orders:    Ambulatory Referral to Sleep Medicine    Ambulatory referral to Weight Management; Future        Follow-up:  She will Return for Follow-up pending results of sleep study..    Thank you for allowing me to participate in the care of your patient.  If there are any questions regarding evaluation please feel free to reach out.       ________________________________________________________________________________________________    Subjective:     HPI: Gela Bell is a 34 y.o. female with PMHx of chronic migraine, PTSD, MDD, DANIELA, mood disorder (no manic episodes in the past that she is aware of), vitamin D deficiency who presents for generally poor sleep.    In the process of moving; will be moving to a new location in [place]. Lives with fiance and daughter.     She states \"I don't get sleep,\" and when she does, it is not restorative.         SLEEP-WAKE SCHEDULE  Sleep Schedule:  Weekdays:  Bedtime: 7674-8860   Asleep in  1-3 hours without medication, \"or I'll fall asleep as soon as the sun rises.  With medication, <1 hour.  Nighttime awakenings: None     Wake: 0600, " "then falls back asleep, then wakes up finally ~0700/0800. Does not feel refreshed.    Naps: Only if she has not gotten sleep    Average total sleep time (in a 24 hour period): ~8-10 hours.    Weekends:  Essentially the same    Sleep-Related Details:  Preferred Sleep Position: side(s)  SDB Symptoms: snoring, morning headaches, dry mouth/nose, and waking unrefreshed  Bruxism: \"Likely\"  Nocturnal Anxiety or Rumination: Yes,    Sleep-Related Hallucinations: Yes, will sometimes hear something that's not there (something drop, screams; varies) 1-2x/month or so    Sleep Paralysis: No     PLM Symptoms: Unknown      Parasomnias:  She denies any parasomnia activity.    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      PAST TREATMENTS:  Current:  -Prazosin 1mg nightly (Psychiatry, for PTSD)  -Seroquel 50mg ngihtly (Psychiatry, for sleep and anxiety)    Past:  -Citalopram (worked for a while, then stopped, and wasn't helping anxiety portion)    PRIOR SLEEP STUDIES:  -None. Was supposed to have one in ~2008, but they cancelled it due to it being in-lab and her being \"too anxious for it.\"    OTHER RELEVANT LABS AND STUDIES:  -None          Sitting and reading: Slight chance of dozing  Watching TV: Moderate chance of dozing  Sitting, inactive in a public place (e.g. a theatre or a meeting): Moderate chance of dozing  As a passenger in a car for an hour without a break: High chance of dozing  Lying down to rest in the afternoon when circumstances permit: High chance of dozing  Sitting and talking to someone: Slight chance of dozing  Sitting quietly after a lunch without alcohol: Moderate chance of dozing  In a car, while stopped for a few minutes in traffic: Slight chance of dozing  Total score: 15     Review of " Systems  Pertinent positives/negatives included in HPI and also as noted:     Medical History Reviewed by provider this encounter:     .  Current Outpatient Medications on File Prior to Visit   Medication Sig Dispense Refill    albuterol (Ventolin HFA) 90 mcg/act inhaler Inhale 2 puffs every 6 (six) hours as needed for wheezing or shortness of breath 18 g 2    aspirin-acetaminophen-caffeine (EXCEDRIN MIGRAINE) 250-250-65 MG per tablet Take 1 tablet by mouth every 6 (six) hours as needed for headaches      benzonatate (TESSALON PERLES) 100 mg capsule Take 1 capsule (100 mg total) by mouth 3 (three) times a day as needed for cough 20 capsule 0    cetirizine (ZyrTEC) 10 mg tablet Take 10 mg by mouth daily      cholecalciferol (VITAMIN D3) 1,000 units tablet take 2 tablets by mouth daily 180 tablet 1    escitalopram (LEXAPRO) 20 mg tablet take 1 tablet by mouth once daily 90 tablet 0    loratadine (CLARITIN) 10 mg tablet take 1 tablet by mouth once daily 90 tablet 1    prazosin (MINIPRESS) 1 mg capsule take 1 capsule by mouth at bedtime 90 capsule 0    propranolol (INDERAL LA) 120 mg 24 hr capsule take 1 capsule by mouth once daily 90 capsule 2    QUEtiapine (SEROquel) 50 mg tablet Take 1 tablet (50 mg total) by mouth daily 90 tablet 0    azelastine (ASTELIN) 0.1 % nasal spray 1 spray into each nostril 2 (two) times a day Use in each nostril as directed (Patient not taking: Reported on 3/13/2025) 1 mL 0    fluticasone (VERAMYST) 27.5 MCG/SPRAY nasal spray 2 sprays into each nostril daily (Patient not taking: Reported on 3/13/2025) 9.1 mL 2    guaiFENesin (MUCINEX) 600 mg 12 hr tablet Take 2 tablets (1,200 mg total) by mouth every 12 (twelve) hours (Patient not taking: Reported on 3/13/2025) 40 tablet 0    methylPREDNISolone 4 MG tablet therapy pack Use as directed on package (Patient not taking: Reported on 3/13/2025) 21 each 0     No current facility-administered medications on file prior to visit.      Objective   BP  "100/66 (BP Location: Left arm, Patient Position: Sitting, Cuff Size: Large)   Pulse (!) 109   Temp 98 °F (36.7 °C) (Temporal)   Resp 16   Ht 5' (1.524 m)   Wt 90.3 kg (199 lb)   BMI 38.86 kg/m²        Physical Exam  PHYSICAL EXAMINATION:  Vital Signs:  /66 (BP Location: Left arm, Patient Position: Sitting, Cuff Size: Large)   Pulse (!) 109   Temp 98 °F (36.7 °C) (Temporal)   Resp 16   Ht 5' (1.524 m)   Wt 90.3 kg (199 lb)   BMI 38.86 kg/m²  Body mass index is 38.86 kg/m².    Constitutional: NAD, well appearing   Mental Status: AAOx3  Neck Circumference:   inches  Skin: Warm, dry, no rashes noted   Eyes: PERRL, normal conjunctiva  ENT: Nasal congestion absent  Posterior Airspace:   Roth Tongue Position: 4  Retrognathia: absent  Overbite: absent  High Arched Palate: absent  Tongue Scalloping/Ridging: absent  Uvula: enlarged  Chest: No evidence of respiratory distress, no accessory muscle use; no evidence of peripheral cyanosis  Abdomen: Soft, NT/ND  Extremities: No digital clubbing or pedal edema    NEUROLOGICAL EXAM:  General: Awake, alert, speech fluent, comprehension, naming, repetition intact. Short and long term memory intact.  CN: PERRL, EOMI without nystagmus, facial sensation and strength are normal and symmetric, hearing is intact to conversational tone, palate and tongue movements are intact and symmetric.  Motor: Normal tone, bulk and strength bilaterally.   Sensation: LT intact throughout.  Gait: Able to walk without difficulty. Stance normal.    Data  Lab Results   Component Value Date    HGB 16.0 (H) 08/29/2023    HCT 46.0 08/29/2023    MCV 92 08/29/2023      Lab Results   Component Value Date    CALCIUM 9.2 08/29/2023    K 4.3 08/29/2023    CO2 25 08/29/2023     08/29/2023    BUN 13 08/29/2023    CREATININE 0.71 08/29/2023     No results found for: \"IRON\", \"TIBC\", \"FERRITIN\"  Lab Results   Component Value Date    AST 21 08/29/2023    ALT 20 08/29/2023         Electronically " signed by:    Misael Suarez DO  Board-Certified Neurology and Sleep Medicine  [facility]  03/13/25

## 2025-03-28 DIAGNOSIS — F51.04 CHRONIC INSOMNIA: Primary | ICD-10-CM

## 2025-03-31 ENCOUNTER — TELEPHONE (OUTPATIENT)
Dept: SLEEP CENTER | Facility: CLINIC | Age: 35
End: 2025-03-31

## 2025-03-31 NOTE — TELEPHONE ENCOUNTER
----- Message from Misael Suarez, DO sent at 3/28/2025  4:44 PM EDT -----  Hello! Please reach out to this patient and let her know that I am placing the referral for CBT-I for her insomnia, as we discussed at her last appointment; I discussed the case with her Psychiatry team, who endorse that she would be a good candidate. Thank you!  
Call to patient voicemail message left and [email or web address] message sent with call back number given, [phone].    
5

## 2025-04-10 DIAGNOSIS — F33.1 MDD (MAJOR DEPRESSIVE DISORDER), RECURRENT EPISODE, MODERATE (HCC): ICD-10-CM

## 2025-04-10 RX ORDER — QUETIAPINE FUMARATE 50 MG/1
50 TABLET, FILM COATED ORAL DAILY
Qty: 30 TABLET | Refills: 0 | Status: SHIPPED | OUTPATIENT
Start: 2025-04-10

## 2025-04-10 NOTE — TELEPHONE ENCOUNTER
Hello. Patient needs a follow up appointment please.  Has not been seen since December. Thank you! Radha

## 2025-04-17 ENCOUNTER — TELEMEDICINE (OUTPATIENT)
Dept: PSYCHIATRY | Facility: CLINIC | Age: 35
End: 2025-04-17

## 2025-04-17 DIAGNOSIS — F33.1 MDD (MAJOR DEPRESSIVE DISORDER), RECURRENT EPISODE, MODERATE (HCC): Primary | ICD-10-CM

## 2025-04-17 NOTE — PSYCH
No Call. No Show. No Charge    Gela Bell no showed 04/17/25 appointment.    Treatment Plan not due at this session.

## 2025-04-28 ENCOUNTER — OFFICE VISIT (OUTPATIENT)
Dept: URGENT CARE | Facility: MEDICAL CENTER | Age: 35
End: 2025-04-28
Payer: COMMERCIAL

## 2025-04-28 VITALS
OXYGEN SATURATION: 99 % | SYSTOLIC BLOOD PRESSURE: 126 MMHG | HEART RATE: 60 BPM | WEIGHT: 198.8 LBS | HEIGHT: 60 IN | RESPIRATION RATE: 16 BRPM | BODY MASS INDEX: 39.03 KG/M2 | TEMPERATURE: 98.6 F | DIASTOLIC BLOOD PRESSURE: 70 MMHG

## 2025-04-28 DIAGNOSIS — R19.7 DIARRHEA, UNSPECIFIED TYPE: ICD-10-CM

## 2025-04-28 DIAGNOSIS — K08.89 DENTALGIA: ICD-10-CM

## 2025-04-28 DIAGNOSIS — R11.0 NAUSEA: Primary | ICD-10-CM

## 2025-04-28 LAB — SL AMB POCT URINE HCG: NEGATIVE

## 2025-04-28 PROCEDURE — 99213 OFFICE O/P EST LOW 20 MIN: CPT

## 2025-04-28 PROCEDURE — 81025 URINE PREGNANCY TEST: CPT

## 2025-04-28 RX ORDER — ONDANSETRON 4 MG/1
4 TABLET, FILM COATED ORAL EVERY 8 HOURS PRN
Qty: 20 TABLET | Refills: 0 | Status: SHIPPED | OUTPATIENT
Start: 2025-04-28

## 2025-04-28 NOTE — PROGRESS NOTES
[facility]        NAME: Gela Bell is a 34 y.o. female  : 1990    MRN: [id number]  DATE: 2025  TIME: 5:45 PM    Assessment and Plan   Nausea [R11.0]  1. Nausea  POCT urine HCG    ondansetron (ZOFRAN) 4 mg tablet    Ambulatory Referral to Gastroenterology      2. Dentalgia        3. Diarrhea, unspecified type          POCT HCG negative    Intermittent nausea and diarrhea for months. Normal abdominal exam today. Zofran Rx. GI referral for further evaluation. Patient advised to go to the ER if she develops severe abdominal pain, blood in stool, or intractable vomiting.    Dentalgia without signs of infection on exam. Patient believes she chipped her tooth. OTC treatments recommended. Follow up with dentist.    Patient Instructions   Take Zofran as needed for nausea  Drink plenty of fluids  Follow up with GI  Go to the ER if symptoms worsen    Follow up with dentist  OraGel over the counter for pain  Tylenol and Motrin  Follow up if symptoms worsen or you develop swelling, severe pain    Follow up with PCP in 3-5 days.  Proceed to  ER if symptoms worsen.    If tests have been performed at [facility], our office will contact you with results if changes need to be made to the care plan discussed with you at the visit.  You can review your full results on [facility] MyChart.    Chief Complaint     Chief Complaint   Patient presents with    Vomiting     Started a few weeks ago  Vomiting, stomach ache, and headache  Patient also reports toothache, right upper tooth  OTC cold medication         History of Present Illness       34-year-old female presents with about 2 months of intermittent nausea, diarrhea, abdominal pain.  She reports she vomited 1 time since this began.  Denies fever, blood in stool.  Denies diet changes.  She reports she is sexually active with a male partner and uses protection.  She is not actively having symptoms today.  Patient also notes right upper tooth pain for  several weeks.  She believes she may have chipped a tooth.  She has not seen a dentist for this.  Denies swelling or fever.    Vomiting   Associated symptoms include abdominal pain and diarrhea. Pertinent negatives include no chest pain, chills, coughing or fever.       Review of Systems   Review of Systems   Constitutional:  Negative for appetite change, chills and fever.   HENT:  Positive for dental problem. Negative for facial swelling and sore throat.    Respiratory:  Negative for cough and shortness of breath.    Cardiovascular:  Negative for chest pain.   Gastrointestinal:  Positive for abdominal pain, diarrhea and nausea. Negative for abdominal distention, anal bleeding, blood in stool, constipation and vomiting.         Current Medications       Current Outpatient Medications:     albuterol (Ventolin HFA) 90 mcg/act inhaler, Inhale 2 puffs every 6 (six) hours as needed for wheezing or shortness of breath, Disp: 18 g, Rfl: 2    aspirin-acetaminophen-caffeine (EXCEDRIN MIGRAINE) 250-250-65 MG per tablet, Take 1 tablet by mouth every 6 (six) hours as needed for headaches, Disp: , Rfl:     escitalopram (LEXAPRO) 20 mg tablet, take 1 tablet by mouth once daily, Disp: 90 tablet, Rfl: 0    loratadine (CLARITIN) 10 mg tablet, take 1 tablet by mouth once daily, Disp: 90 tablet, Rfl: 1    ondansetron (ZOFRAN) 4 mg tablet, Take 1 tablet (4 mg total) by mouth every 8 (eight) hours as needed for nausea or vomiting, Disp: 20 tablet, Rfl: 0    prazosin (MINIPRESS) 1 mg capsule, take 1 capsule by mouth at bedtime, Disp: 90 capsule, Rfl: 0    propranolol (INDERAL LA) 120 mg 24 hr capsule, take 1 capsule by mouth once daily, Disp: 90 capsule, Rfl: 2    QUEtiapine (SEROquel) 50 mg tablet, take 1 tablet by mouth once daily, Disp: 30 tablet, Rfl: 0    azelastine (ASTELIN) 0.1 % nasal spray, 1 spray into each nostril 2 (two) times a day Use in each nostril as directed (Patient not taking: Reported on 3/13/2025), Disp: 1 mL, Rfl:  0    benzonatate (TESSALON PERLES) 100 mg capsule, Take 1 capsule (100 mg total) by mouth 3 (three) times a day as needed for cough (Patient not taking: Reported on 2025), Disp: 20 capsule, Rfl: 0    cetirizine (ZyrTEC) 10 mg tablet, Take 10 mg by mouth daily (Patient not taking: Reported on 2025), Disp: , Rfl:     cholecalciferol (VITAMIN D3) 1,000 units tablet, take 2 tablets by mouth daily (Patient not taking: Reported on 2025), Disp: 180 tablet, Rfl: 1    fluticasone (VERAMYST) 27.5 MCG/SPRAY nasal spray, 2 sprays into each nostril daily (Patient not taking: Reported on 3/13/2025), Disp: 9.1 mL, Rfl: 2    guaiFENesin (MUCINEX) 600 mg 12 hr tablet, Take 2 tablets (1,200 mg total) by mouth every 12 (twelve) hours (Patient not taking: Reported on 3/13/2025), Disp: 40 tablet, Rfl: 0    methylPREDNISolone 4 MG tablet therapy pack, Use as directed on package (Patient not taking: Reported on 3/13/2025), Disp: 21 each, Rfl: 0    Current Allergies     Allergies as of 2025 - Reviewed 2025   Allergen Reaction Noted    Xanax [alprazolam] Other (See Comments) 2023            The following portions of the patient's history were reviewed and updated as appropriate: allergies, current medications, past family history, past medical history, past social history, past surgical history and problem list.     Past Medical History:   Diagnosis Date    Allergic     Anxiety     Depression        Past Surgical History:   Procedure Laterality Date     SECTION N/A        Family History   Adopted: Yes   Problem Relation Age of Onset    Drug abuse Mother     Drug abuse Father          Medications have been verified.        Objective   /70   Pulse 60   Temp 98.6 °F (37 °C)   Resp 16   Ht 5' (1.524 m)   Wt 90.2 kg (198 lb 12.8 oz)   LMP  (LMP Unknown)   SpO2 99%   BMI 38.83 kg/m²   No LMP recorded (lmp unknown). Patient has had an implant.       Physical Exam     Physical Exam  Vitals and  nursing note reviewed.   Constitutional:       General: She is not in acute distress.     Appearance: Normal appearance. She is not ill-appearing, toxic-appearing or diaphoretic.   HENT:      Head: Normocephalic and atraumatic.      Right Ear: External ear normal.      Left Ear: External ear normal.      Nose: Nose normal.      Mouth/Throat:      Mouth: Mucous membranes are moist.      Pharynx: Oropharynx is clear. No oropharyngeal exudate or posterior oropharyngeal erythema.   Eyes:      Conjunctiva/sclera: Conjunctivae normal.   Cardiovascular:      Heart sounds: Normal heart sounds.   Pulmonary:      Effort: Pulmonary effort is normal.      Breath sounds: Normal breath sounds.   Abdominal:      General: Abdomen is protuberant. Bowel sounds are normal. There is no distension.      Palpations: Abdomen is soft. There is no mass.      Tenderness: There is no abdominal tenderness. There is no guarding or rebound. Negative signs include Richards's sign, Rovsing's sign and McBurney's sign.   Skin:     General: Skin is warm and dry.      Capillary Refill: Capillary refill takes less than 2 seconds.   Neurological:      General: No focal deficit present.      Mental Status: She is alert.   Psychiatric:         Mood and Affect: Mood normal.         Behavior: Behavior normal.

## 2025-04-28 NOTE — PATIENT INSTRUCTIONS
Take Zofran as needed for nausea  Drink plenty of fluids  Follow up with GI  Go to the ER if symptoms worsen    Follow up with dentist  OraGel over the counter for pain  Tylenol and Motrin  Follow up if symptoms worsen or you develop swelling, severe pain

## 2025-05-02 DIAGNOSIS — F33.1 MDD (MAJOR DEPRESSIVE DISORDER), RECURRENT EPISODE, MODERATE (HCC): ICD-10-CM

## 2025-05-02 DIAGNOSIS — F41.1 GAD (GENERALIZED ANXIETY DISORDER): ICD-10-CM

## 2025-05-02 DIAGNOSIS — F43.10 POST TRAUMATIC STRESS DISORDER (PTSD): ICD-10-CM

## 2025-05-02 RX ORDER — PRAZOSIN HYDROCHLORIDE 1 MG/1
1 CAPSULE ORAL
Qty: 90 CAPSULE | Refills: 0 | OUTPATIENT
Start: 2025-05-02

## 2025-05-02 RX ORDER — ESCITALOPRAM OXALATE 20 MG/1
20 TABLET ORAL DAILY
Qty: 90 TABLET | Refills: 0 | OUTPATIENT
Start: 2025-05-02

## 2025-05-02 NOTE — TELEPHONE ENCOUNTER
She will need a follow up to receive any refills thank you. She has not been seen since December 2024.

## 2025-05-05 NOTE — TELEPHONE ENCOUNTER
Could not leave voicemail for patient to call to schedule appointment with Radha Johnson in order to receive prescription refills.  Sent letter via [employer].

## 2025-05-11 DIAGNOSIS — J40 BRONCHITIS: ICD-10-CM

## 2025-05-12 DIAGNOSIS — J40 BRONCHITIS: ICD-10-CM

## 2025-05-12 RX ORDER — ALBUTEROL SULFATE 90 UG/1
2 INHALANT RESPIRATORY (INHALATION) EVERY 6 HOURS PRN
Qty: 18 G | Refills: 2 | Status: SHIPPED | OUTPATIENT
Start: 2025-05-12

## 2025-05-12 RX ORDER — ALBUTEROL SULFATE 90 UG/1
INHALANT RESPIRATORY (INHALATION)
Qty: 8.5 G | OUTPATIENT
Start: 2025-05-12

## 2025-06-06 ENCOUNTER — HOSPITAL ENCOUNTER (OUTPATIENT)
Dept: SLEEP CENTER | Facility: HOSPITAL | Age: 35
Discharge: HOME | End: 2025-06-06
Attending: STUDENT IN AN ORGANIZED HEALTH CARE EDUCATION/TRAINING PROGRAM
Payer: COMMERCIAL

## 2025-06-06 DIAGNOSIS — G47.19 EXCESSIVE DAYTIME SLEEPINESS: ICD-10-CM

## 2025-06-06 PROCEDURE — 95810 POLYSOM 6/> YRS 4/> PARAM: CPT

## 2025-06-07 ENCOUNTER — HOSPITAL ENCOUNTER (EMERGENCY)
Facility: HOSPITAL | Age: 35
Discharge: HOME | End: 2025-06-07
Attending: EMERGENCY MEDICINE | Admitting: EMERGENCY MEDICINE
Payer: COMMERCIAL

## 2025-06-07 VITALS
RESPIRATION RATE: 16 BRPM | DIASTOLIC BLOOD PRESSURE: 70 MMHG | TEMPERATURE: 97.8 F | HEIGHT: 60 IN | BODY MASS INDEX: 38.87 KG/M2 | SYSTOLIC BLOOD PRESSURE: 137 MMHG | WEIGHT: 198 LBS | HEART RATE: 96 BPM | OXYGEN SATURATION: 97 %

## 2025-06-07 DIAGNOSIS — K04.7 DENTAL INFECTION: Primary | ICD-10-CM

## 2025-06-07 PROBLEM — G47.33 OSA (OBSTRUCTIVE SLEEP APNEA): Status: ACTIVE | Noted: 2025-06-07

## 2025-06-07 PROCEDURE — 99284 EMERGENCY DEPT VISIT MOD MDM: CPT | Performed by: PHYSICIAN ASSISTANT

## 2025-06-07 PROCEDURE — 99282 EMERGENCY DEPT VISIT SF MDM: CPT

## 2025-06-07 RX ORDER — PENICILLIN V POTASSIUM 250 MG/1
500 TABLET, FILM COATED ORAL ONCE
Status: COMPLETED | OUTPATIENT
Start: 2025-06-07 | End: 2025-06-07

## 2025-06-07 RX ORDER — PENICILLIN V POTASSIUM 500 MG/1
500 TABLET, FILM COATED ORAL 4 TIMES DAILY
Qty: 40 TABLET | Refills: 0 | Status: SHIPPED | OUTPATIENT
Start: 2025-06-07 | End: 2025-06-17

## 2025-06-07 RX ADMIN — PENICILLIN V POTASSIUM 500 MG: 250 TABLET, FILM COATED ORAL at 19:28

## 2025-06-07 NOTE — PROGRESS NOTES
Sleep Study Documentation    Pre-Sleep Study       Sleep testing procedure explained to patient:YES    Patient napped prior to study:NO    Caffeine:Dayshift worker after 12PM.  Caffeine use:NO    Alcohol:Dayshift workers after 5PM: Alcohol use:NO    Typical day for patient:YES         Study Documentation    Sleep Study Indications: snoring excessive daytime sleepiness  BMI > 30    Montage used: Standard    Transcutaneous CO2 used: NO    Sleep Study Type:     Diagnostic Sleep Study     Treatment: N/A      Oxygen Data  Supplemental O2 used: No      EKG/EEG/Abnormal Behaviors   EKG abnormalities: no None    EEG abnormalities: no    Were abnormal behaviors in sleep observed:NO  No    Snoring    Character:  Soft to Moderate    Frequency: Intermittent        Is Total Sleep Study Recording Time < 2 hours: N/A    Is Total Sleep Study Recording Time > 2 hours but study is incomplete: N/A    Is Total Sleep Study Recording Time 6 hours or more but sleep was not obtained: NO        Post-Sleep Study    Medication used at bedtime or during sleep study:NO    Patient reports time it took to fall asleep:30 to 60 minutes    Patient reports waking up during study:3 or more times.  Patient reports returning to sleep without difficulty.    Patient reports sleeping 4 to 6 hours with dreaming.    Does the Patient feel this is a typical night of sleep:typical    Patient rated sleepiness: Very sleepy or tired

## 2025-06-07 NOTE — ED PROVIDER NOTES
Time reflects when diagnosis was documented in both MDM as applicable and the Disposition within this note       Time User Action Codes Description Comment    6/7/2025  7:23 PM Misael Plascencia Add [K04.7] Dental infection           ED Disposition       ED Disposition   Discharge    Condition   Stable    Date/Time   Sat Jun 7, 2025  7:23 PM    Comment   Gela Bell discharge to home/self care.                   Assessment & Plan       Medical Decision Making  34-year-old female presenting for evaluation of dental pain.  See HPI for further details the differential diagnosis in this patient includes dentalgia, fractured tooth, dental abscess, Rajiv's angina.    Examination findings are fairly benign there is mild gingival erythema will treat empirically with penicillin VK.  No airway concerns no submandibular or submental tenderness or swelling    Risk  Prescription drug management.        ED Course as of 06/07/25 1924   Sat Jun 07, 2025 1914 Blood Pressure: 137/70   1914 Temperature: 97.8 °F (36.6 °C)   1914 Pulse: 96   1914 Respirations: 16   1914 SpO2: 97 %  Vital signs reviewed within normal limits.       Medications   penicillin V potassium (VEETID) tablet 500 mg (has no administration in time range)       ED Risk Strat Scores                    No data recorded        SBIRT 20yo+      Flowsheet Row Most Recent Value   Initial Alcohol Screen: US AUDIT-C     1. How often do you have a drink containing alcohol? 0 Filed at: 06/07/2025 1908   2. How many drinks containing alcohol do you have on a typical day you are drinking?  0 Filed at: 06/07/2025 1908   3b. FEMALE Any Age, or MALE 65+: How often do you have 4 or more drinks on one occassion? 0 Filed at: 06/07/2025 1908   Audit-C Score 0 Filed at: 06/07/2025 1908   SAMANTHA: How many times in the past year have you...    Used an illegal drug or used a prescription medication for non-medical reasons? Never Filed at: 06/07/2025 1908                             History of Present Illness       Chief Complaint   Patient presents with    Dental Pain     Patient reports intermittent right upper dental pain. States cracked tooth. No dentist. Taking tylenol- last dose about 1 hour ago       Past Medical History[1]   Past Surgical History[2]   Family History[3]   Social History[4]   E-Cigarette/Vaping    E-Cigarette Use Never User       E-Cigarette/Vaping Substances    Nicotine No     THC No     CBD No     Flavoring No     Other No     Unknown No       I have reviewed and agree with the history as documented.     This is a 34-year-old female presenting to the emergency department today for evaluation of right upper molar pain.  Ongoing for about a week.  No history of fever chills or swallows no problems swallowing denies any neck swelling.  Vital signs reviewed within normal limits nontoxic-appearing.      Dental Pain  Associated symptoms: no fever        Review of Systems   Constitutional:  Negative for chills and fever.   HENT:  Positive for dental problem. Negative for ear pain and sore throat.    Eyes:  Negative for pain and visual disturbance.   Respiratory:  Negative for cough and shortness of breath.    Cardiovascular:  Negative for chest pain and palpitations.   Gastrointestinal:  Negative for abdominal pain and vomiting.   Genitourinary:  Negative for dysuria and hematuria.   Musculoskeletal:  Negative for arthralgias and back pain.   Skin:  Negative for color change and rash.   Neurological:  Negative for seizures and syncope.   All other systems reviewed and are negative.          Objective       ED Triage Vitals [06/07/25 1907]   Temperature Pulse Blood Pressure Respirations SpO2 Patient Position - Orthostatic VS   97.8 °F (36.6 °C) 96 137/70 16 97 % Sitting      Temp Source Heart Rate Source BP Location FiO2 (%) Pain Score    Temporal Monitor Right arm -- --      Vitals      Date and Time Temp Pulse SpO2 Resp BP Pain Score FACES Pain Rating User   06/07/25  1907 97.8 °F (36.6 °C) 96 97 % 16 137/70 -- -- PP            Physical Exam  Vitals reviewed.   Constitutional:       General: She is not in acute distress.     Appearance: Normal appearance. She is not ill-appearing, toxic-appearing or diaphoretic.   HENT:      Head: Normocephalic and atraumatic.      Right Ear: External ear normal.      Left Ear: External ear normal.      Mouth/Throat:        Comments: Mild gingival erythema without identifiable drainable abscess.    Eyes:      General: No scleral icterus.        Right eye: No discharge.         Left eye: No discharge.      Extraocular Movements: Extraocular movements intact.      Conjunctiva/sclera: Conjunctivae normal.       Cardiovascular:      Rate and Rhythm: Normal rate.      Pulses: Normal pulses.   Pulmonary:      Effort: Pulmonary effort is normal. No respiratory distress.      Breath sounds: No stridor.     Musculoskeletal:         General: No deformity or signs of injury.      Cervical back: Normal range of motion. No rigidity.     Skin:     General: Skin is warm.      Coloration: Skin is not jaundiced.      Findings: No lesion or rash.     Neurological:      General: No focal deficit present.      Mental Status: She is alert and oriented to person, place, and time. Mental status is at baseline.      Gait: Gait normal.     Psychiatric:         Mood and Affect: Mood normal.         Thought Content: Thought content normal.         Judgment: Judgment normal.         Results Reviewed       None            No orders to display       Procedures    ED Medication and Procedure Management   Prior to Admission Medications   Prescriptions Last Dose Informant Patient Reported? Taking?   QUEtiapine (SEROquel) 50 mg tablet 6/3/2025  No Yes   Sig: take 1 tablet by mouth once daily   albuterol (Ventolin HFA) 90 mcg/act inhaler   No No   Sig: Inhale 2 puffs every 6 (six) hours as needed for wheezing or shortness of breath   aspirin-acetaminophen-caffeine (EXCEDRIN  MIGRAINE) 250-250-65 MG per tablet  Self Yes No   Sig: Take 1 tablet by mouth every 6 (six) hours as needed for headaches   azelastine (ASTELIN) 0.1 % nasal spray  Self No No   Si spray into each nostril 2 (two) times a day Use in each nostril as directed   Patient not taking: Reported on 3/13/2025   benzonatate (TESSALON PERLES) 100 mg capsule  Self No No   Sig: Take 1 capsule (100 mg total) by mouth 3 (three) times a day as needed for cough   Patient not taking: Reported on 2025   cetirizine (ZyrTEC) 10 mg tablet  Self Yes No   Sig: Take 10 mg by mouth daily   Patient not taking: Reported on 2025   cholecalciferol (VITAMIN D3) 1,000 units tablet  Self No No   Sig: take 2 tablets by mouth daily   Patient not taking: Reported on 2025   escitalopram (LEXAPRO) 20 mg tablet 2025 Self No Yes   Sig: take 1 tablet by mouth once daily   fluticasone (VERAMYST) 27.5 MCG/SPRAY nasal spray  Self No No   Si sprays into each nostril daily   Patient not taking: Reported on 3/13/2025   guaiFENesin (MUCINEX) 600 mg 12 hr tablet  Self No No   Sig: Take 2 tablets (1,200 mg total) by mouth every 12 (twelve) hours   Patient not taking: Reported on 3/13/2025   loratadine (CLARITIN) 10 mg tablet 2025 Self No Yes   Sig: take 1 tablet by mouth once daily   methylPREDNISolone 4 MG tablet therapy pack  Self No No   Sig: Use as directed on package   Patient not taking: Reported on 3/13/2025   ondansetron (ZOFRAN) 4 mg tablet   No No   Sig: Take 1 tablet (4 mg total) by mouth every 8 (eight) hours as needed for nausea or vomiting   prazosin (MINIPRESS) 1 mg capsule 6/3/2025 Self No Yes   Sig: take 1 capsule by mouth at bedtime   propranolol (INDERAL LA) 120 mg 24 hr capsule 2025 Self No Yes   Sig: take 1 capsule by mouth once daily      Facility-Administered Medications: None     Patient's Medications   Discharge Prescriptions    PENICILLIN V POTASSIUM (VEETID) 500 MG TABLET    Take 1 tablet (500 mg total) by  mouth 4 (four) times a day for 10 days       Start Date: 2025  End Date: 2025       Order Dose: 500 mg       Quantity: 40 tablet    Refills: 0     No discharge procedures on file.  ED SEPSIS DOCUMENTATION   Time reflects when diagnosis was documented in both MDM as applicable and the Disposition within this note       Time User Action Codes Description Comment    2025  7:23 PM Misael Plascencia Add [K04.7] Dental infection                    [1]   Past Medical History:  Diagnosis Date    Allergic     Anxiety     Depression    [2]   Past Surgical History:  Procedure Laterality Date     SECTION N/A    [3]   Family History  Adopted: Yes   Problem Relation Name Age of Onset    Drug abuse Mother      Drug abuse Father     [4]   Social History  Tobacco Use    Smoking status: Never    Smokeless tobacco: Never   Vaping Use    Vaping status: Never Used   Substance Use Topics    Alcohol use: Not Currently     Comment: drinks very rarely 1 glass per occasion    Drug use: Never        Misael Plascencia PA-C  25 1924

## 2025-06-20 ENCOUNTER — RESULTS FOLLOW-UP (OUTPATIENT)
Dept: SLEEP CENTER | Facility: CLINIC | Age: 35
End: 2025-06-20

## 2025-06-20 DIAGNOSIS — G47.33 OSA (OBSTRUCTIVE SLEEP APNEA): Primary | ICD-10-CM

## 2025-06-24 NOTE — TELEPHONE ENCOUNTER
Diagnostic study resulted, confirms severe MADONNA (AHI-36.7) with respiratory event related oxygen desaturations.     CPAP titration study recommended.    Call placed to patient, left call back message     [employer] message sent providing results, recommendations and instructions.

## 2025-06-27 ENCOUNTER — OFFICE VISIT (OUTPATIENT)
Dept: URGENT CARE | Facility: MEDICAL CENTER | Age: 35
End: 2025-06-27
Payer: COMMERCIAL

## 2025-06-27 VITALS
DIASTOLIC BLOOD PRESSURE: 79 MMHG | OXYGEN SATURATION: 99 % | WEIGHT: 202 LBS | SYSTOLIC BLOOD PRESSURE: 111 MMHG | HEIGHT: 60 IN | TEMPERATURE: 97.9 F | RESPIRATION RATE: 20 BRPM | BODY MASS INDEX: 39.66 KG/M2 | HEART RATE: 75 BPM

## 2025-06-27 DIAGNOSIS — K04.7 DENTAL INFECTION: Primary | ICD-10-CM

## 2025-06-27 PROCEDURE — 99213 OFFICE O/P EST LOW 20 MIN: CPT

## 2025-06-27 RX ORDER — CHLORHEXIDINE GLUCONATE ORAL RINSE 1.2 MG/ML
15 SOLUTION DENTAL 2 TIMES DAILY
Qty: 120 ML | Refills: 0 | Status: SHIPPED | OUTPATIENT
Start: 2025-06-27

## 2025-06-27 NOTE — PROGRESS NOTES
[facility]        NAME: Gela Bell is a 34 y.o. female  : 1990    MRN: [id number]  DATE: 2025  TIME: 6:06 PM    Assessment and Plan   Dental infection [K04.7]  1. Dental infection  chlorhexidine (PERIDEX) 0.12 % solution      Has rx for PCN has not taken   Denies fever, chills, nausea, vomiting, dysphagia, dyspnea, drooling, neck rigidity   Rajiv's angina was considered as a differential during evaluation and ruled out.  Provided warning signs of deep soft tissue infection    Patient Instructions       Start antibiotic as prescribed previously  Take Tylenol (650 mg) and Ibuprofen (400 mg) every 6 hrs as needed for pain  May apply ice to face but do not apply directly to skin  Rinse your mouth every 2 hours with salt water.  To prevent abscess, gently brush teeth at least twice daily with a soft toothbrush and use dental floss at least once daily.  Eat soft foods to reduce pain.  Follow up with a dentist as soon as possible  If symptoms worsen or you develop a fever go to the ER for further evaluation     Wili Owusu [facility]  [address] [place] [address] [place] [id number] [phone]     [facility]. \  They provide dental care to patients with state insurance.  Chucho Zuniga, DMD   [address] [place], [place]. [id number] [phone]       Follow up with PCP in 3-5 days.  Proceed to  ER if symptoms worsen.    If tests are performed, our office will contact you with results only if changes need to made to the care plan discussed with you at the visit. [facility].    Chief Complaint     Chief Complaint   Patient presents with    Dental Problem     Right upper jaw. Some facial swelling noted started today          History of Present Illness       Patient presents with right upper jaw swelling and pain. She reports she was at dr last week for dental infection and did not take PCN that she was  prescribed because she forgot to take it. Has no dentist        Review of Systems   Review of Systems   Constitutional:  Negative for chills and fever.   HENT:  Positive for dental problem. Negative for ear pain and sore throat.    Eyes:  Negative for pain and visual disturbance.   Respiratory:  Negative for cough and shortness of breath.    Cardiovascular:  Negative for chest pain and palpitations.   Gastrointestinal:  Negative for abdominal pain and vomiting.   Genitourinary:  Negative for dysuria and hematuria.   Musculoskeletal:  Negative for arthralgias and back pain.   Skin:  Negative for color change and rash.   Neurological:  Negative for seizures and syncope.   All other systems reviewed and are negative.        Current Medications     Current Medications[1]    Current Allergies     Allergies as of 06/27/2025 - Reviewed 06/27/2025   Allergen Reaction Noted    Xanax [alprazolam] Other (See Comments) 08/29/2023            The following portions of the patient's history were reviewed and updated as appropriate: allergies, current medications, past family history, past medical history, past social history, past surgical history and problem list.     Past Medical History[2]    Past Surgical History[3]    Family History[4]      Medications have been verified.        Objective   /79   Pulse 75   Temp 97.9 °F (36.6 °C)   Resp 20   Ht 5' (1.524 m)   Wt 91.6 kg (202 lb)   SpO2 99%   BMI 39.45 kg/m²        Physical Exam     Physical Exam  Vitals and nursing note reviewed.   Constitutional:       General: She is not in acute distress.     Appearance: Normal appearance. She is not toxic-appearing.   HENT:      Head: Normocephalic.      Mouth/Throat:      Mouth: Mucous membranes are moist.      Pharynx: Oropharynx is clear.        Comments: Multiple broken upper teeth on right side. Gingiva red and inflammed    Cardiovascular:      Rate and Rhythm: Normal rate and regular rhythm.      Pulses: Normal pulses.       Heart sounds: Normal heart sounds. No murmur heard.  Pulmonary:      Effort: Pulmonary effort is normal.      Breath sounds: Normal breath sounds. No wheezing, rhonchi or rales.     Musculoskeletal:      Cervical back: Normal range of motion and neck supple. No rigidity or tenderness.   Lymphadenopathy:      Cervical: No cervical adenopathy.     Neurological:      Mental Status: She is alert.                        [1]   Current Outpatient Medications:     albuterol (Ventolin HFA) 90 mcg/act inhaler, Inhale 2 puffs every 6 (six) hours as needed for wheezing or shortness of breath, Disp: 18 g, Rfl: 2    aspirin-acetaminophen-caffeine (EXCEDRIN MIGRAINE) 250-250-65 MG per tablet, Take 1 tablet by mouth every 6 (six) hours as needed for headaches, Disp: , Rfl:     chlorhexidine (PERIDEX) 0.12 % solution, Apply 15 mL to the mouth or throat 2 (two) times a day, Disp: 120 mL, Rfl: 0    escitalopram (LEXAPRO) 20 mg tablet, take 1 tablet by mouth once daily, Disp: 90 tablet, Rfl: 0    loratadine (CLARITIN) 10 mg tablet, take 1 tablet by mouth once daily, Disp: 90 tablet, Rfl: 1    ondansetron (ZOFRAN) 4 mg tablet, Take 1 tablet (4 mg total) by mouth every 8 (eight) hours as needed for nausea or vomiting, Disp: 20 tablet, Rfl: 0    prazosin (MINIPRESS) 1 mg capsule, take 1 capsule by mouth at bedtime, Disp: 90 capsule, Rfl: 0    propranolol (INDERAL LA) 120 mg 24 hr capsule, take 1 capsule by mouth once daily, Disp: 90 capsule, Rfl: 2    QUEtiapine (SEROquel) 50 mg tablet, take 1 tablet by mouth once daily, Disp: 30 tablet, Rfl: 0    azelastine (ASTELIN) 0.1 % nasal spray, 1 spray into each nostril 2 (two) times a day Use in each nostril as directed (Patient not taking: Reported on 3/13/2025), Disp: 1 mL, Rfl: 0    benzonatate (TESSALON PERLES) 100 mg capsule, Take 1 capsule (100 mg total) by mouth 3 (three) times a day as needed for cough (Patient not taking: Reported on 4/28/2025), Disp: 20 capsule, Rfl: 0     cetirizine (ZyrTEC) 10 mg tablet, Take 10 mg by mouth daily (Patient not taking: Reported on 2025), Disp: , Rfl:     cholecalciferol (VITAMIN D3) 1,000 units tablet, take 2 tablets by mouth daily (Patient not taking: Reported on 2025), Disp: 180 tablet, Rfl: 1    fluticasone (VERAMYST) 27.5 MCG/SPRAY nasal spray, 2 sprays into each nostril daily (Patient not taking: Reported on 3/13/2025), Disp: 9.1 mL, Rfl: 2    guaiFENesin (MUCINEX) 600 mg 12 hr tablet, Take 2 tablets (1,200 mg total) by mouth every 12 (twelve) hours (Patient not taking: Reported on 3/13/2025), Disp: 40 tablet, Rfl: 0    methylPREDNISolone 4 MG tablet therapy pack, Use as directed on package (Patient not taking: Reported on 3/13/2025), Disp: 21 each, Rfl: 0  [2]   Past Medical History:  Diagnosis Date    Allergic     Anxiety     Depression    [3]   Past Surgical History:  Procedure Laterality Date     SECTION N/A    [4]   Family History  Adopted: Yes   Problem Relation Name Age of Onset    Drug abuse Mother      Drug abuse Father

## 2025-06-27 NOTE — PATIENT INSTRUCTIONS
Start antibiotic as prescribed previously  Take Tylenol (650 mg) and Ibuprofen (400 mg) every 6 hrs as needed for pain  May apply ice to face but do not apply directly to skin  Rinse your mouth every 2 hours with salt water.  To prevent abscess, gently brush teeth at least twice daily with a soft toothbrush and use dental floss at least once daily.  Eat soft foods to reduce pain.  Follow up with a dentist as soon as possible  If symptoms worsen or you develop a fever go to the ER for further evaluation     [facility] Owusu [facility]  [address] [place] [address] [place] [address] [phone]     [facility]. \  They provide dental care to patients with state insurance.  Chucho Zuniga, DMD   [address] [place]. [address] [phone]       Follow up with PCP in 3-5 days.  Proceed to  ER if symptoms worsen.    If tests are performed, our office will contact you with results only if changes need to made to the care plan discussed with you at the visit. You can review your full results on [facility] My[facility].

## 2025-06-28 ENCOUNTER — HOSPITAL ENCOUNTER (EMERGENCY)
Facility: HOSPITAL | Age: 35
Discharge: HOME | End: 2025-06-28
Attending: EMERGENCY MEDICINE | Admitting: EMERGENCY MEDICINE
Payer: COMMERCIAL

## 2025-06-28 ENCOUNTER — APPOINTMENT (EMERGENCY)
Dept: CT IMAGING | Facility: HOSPITAL | Age: 35
End: 2025-06-28
Payer: COMMERCIAL

## 2025-06-28 VITALS
TEMPERATURE: 97.6 F | DIASTOLIC BLOOD PRESSURE: 64 MMHG | SYSTOLIC BLOOD PRESSURE: 111 MMHG | OXYGEN SATURATION: 97 % | RESPIRATION RATE: 18 BRPM | BODY MASS INDEX: 39.27 KG/M2 | WEIGHT: 200 LBS | HEIGHT: 60 IN | HEART RATE: 64 BPM

## 2025-06-28 DIAGNOSIS — K04.7 DENTAL INFECTION: ICD-10-CM

## 2025-06-28 DIAGNOSIS — K04.7 DENTAL ABSCESS: Primary | ICD-10-CM

## 2025-06-28 LAB
ANION GAP SERPL CALCULATED.3IONS-SCNC: 6 MMOL/L (ref 4–13)
BASOPHILS # BLD AUTO: 0.12 THOUSANDS/ÂΜL (ref 0–0.1)
BASOPHILS NFR BLD AUTO: 1 % (ref 0–1)
BUN SERPL-MCNC: 8 MG/DL (ref 5–25)
CALCIUM SERPL-MCNC: 8.5 MG/DL (ref 8.4–10.2)
CHLORIDE SERPL-SCNC: 108 MMOL/L (ref 96–108)
CO2 SERPL-SCNC: 24 MMOL/L (ref 21–32)
CREAT SERPL-MCNC: 0.73 MG/DL (ref 0.6–1.3)
EOSINOPHIL # BLD AUTO: 1.28 THOUSAND/ÂΜL (ref 0–0.61)
EOSINOPHIL NFR BLD AUTO: 13 % (ref 0–6)
ERYTHROCYTE [DISTWIDTH] IN BLOOD BY AUTOMATED COUNT: 12.3 % (ref 11.6–15.1)
GFR SERPL CREATININE-BSD FRML MDRD: 106 ML/MIN/1.73SQ M
GLUCOSE SERPL-MCNC: 89 MG/DL (ref 65–140)
HCT VFR BLD AUTO: 39.4 % (ref 34.8–46.1)
HGB BLD-MCNC: 13 G/DL (ref 11.5–15.4)
IMM GRANULOCYTES # BLD AUTO: 0.02 THOUSAND/UL (ref 0–0.2)
IMM GRANULOCYTES NFR BLD AUTO: 0 % (ref 0–2)
LYMPHOCYTES # BLD AUTO: 2.25 THOUSANDS/ÂΜL (ref 0.6–4.47)
LYMPHOCYTES NFR BLD AUTO: 24 % (ref 14–44)
MCH RBC QN AUTO: 31.4 PG (ref 26.8–34.3)
MCHC RBC AUTO-ENTMCNC: 33 G/DL (ref 31.4–37.4)
MCV RBC AUTO: 95 FL (ref 82–98)
MONOCYTES # BLD AUTO: 0.66 THOUSAND/ÂΜL (ref 0.17–1.22)
MONOCYTES NFR BLD AUTO: 7 % (ref 4–12)
NEUTROPHILS # BLD AUTO: 5.26 THOUSANDS/ÂΜL (ref 1.85–7.62)
NEUTS SEG NFR BLD AUTO: 55 % (ref 43–75)
NRBC BLD AUTO-RTO: 0 /100 WBCS
PLATELET # BLD AUTO: 237 THOUSANDS/UL (ref 149–390)
PMV BLD AUTO: 8.8 FL (ref 8.9–12.7)
POTASSIUM SERPL-SCNC: 3.9 MMOL/L (ref 3.5–5.3)
RBC # BLD AUTO: 4.14 MILLION/UL (ref 3.81–5.12)
SODIUM SERPL-SCNC: 138 MMOL/L (ref 135–147)
WBC # BLD AUTO: 9.59 THOUSAND/UL (ref 4.31–10.16)

## 2025-06-28 PROCEDURE — 99284 EMERGENCY DEPT VISIT MOD MDM: CPT | Performed by: EMERGENCY MEDICINE

## 2025-06-28 PROCEDURE — 99283 EMERGENCY DEPT VISIT LOW MDM: CPT

## 2025-06-28 PROCEDURE — 70487 CT MAXILLOFACIAL W/DYE: CPT

## 2025-06-28 PROCEDURE — 96365 THER/PROPH/DIAG IV INF INIT: CPT

## 2025-06-28 PROCEDURE — 96375 TX/PRO/DX INJ NEW DRUG ADDON: CPT

## 2025-06-28 PROCEDURE — 80048 BASIC METABOLIC PNL TOTAL CA: CPT | Performed by: EMERGENCY MEDICINE

## 2025-06-28 PROCEDURE — 85025 COMPLETE CBC W/AUTO DIFF WBC: CPT | Performed by: EMERGENCY MEDICINE

## 2025-06-28 PROCEDURE — 36415 COLL VENOUS BLD VENIPUNCTURE: CPT | Performed by: EMERGENCY MEDICINE

## 2025-06-28 RX ORDER — CLINDAMYCIN HYDROCHLORIDE 300 MG/1
300 CAPSULE ORAL 4 TIMES DAILY
Qty: 28 CAPSULE | Refills: 0 | Status: SHIPPED | OUTPATIENT
Start: 2025-06-28 | End: 2025-07-06

## 2025-06-28 RX ORDER — CHLORHEXIDINE GLUCONATE ORAL RINSE 1.2 MG/ML
15 SOLUTION DENTAL 2 TIMES DAILY
Qty: 120 ML | Refills: 0 | Status: SHIPPED | OUTPATIENT
Start: 2025-06-28

## 2025-06-28 RX ORDER — CLINDAMYCIN PHOSPHATE 600 MG/50ML
600 INJECTION, SOLUTION INTRAVENOUS ONCE
Status: COMPLETED | OUTPATIENT
Start: 2025-06-28 | End: 2025-06-28

## 2025-06-28 RX ORDER — DEXAMETHASONE SODIUM PHOSPHATE 10 MG/ML
10 INJECTION, SOLUTION INTRAMUSCULAR; INTRAVENOUS ONCE
Status: COMPLETED | OUTPATIENT
Start: 2025-06-28 | End: 2025-06-28

## 2025-06-28 RX ADMIN — CLINDAMYCIN PHOSPHATE 600 MG: 600 INJECTION, SOLUTION INTRAVENOUS at 15:37

## 2025-06-28 RX ADMIN — IOHEXOL 85 ML: 350 INJECTION, SOLUTION INTRAVENOUS at 15:53

## 2025-06-28 RX ADMIN — DEXAMETHASONE SODIUM PHOSPHATE 10 MG: 10 INJECTION, SOLUTION INTRAMUSCULAR; INTRAVENOUS at 15:37

## 2025-06-28 RX ADMIN — SODIUM CHLORIDE 1000 ML: 0.9 INJECTION, SOLUTION INTRAVENOUS at 15:37

## 2025-06-28 NOTE — ED PROVIDER NOTES
Time reflects when diagnosis was documented in both MDM as applicable and the Disposition within this note       Time User Action Codes Description Comment    6/28/2025  4:45 PM Alfa Montoya Add [K04.7] Dental abscess     6/28/2025  4:45 PM Alfa Montoya Add [K04.7] Dental infection           ED Disposition       ED Disposition   Discharge    Condition   Stable    Date/Time   Sat Jun 28, 2025  4:45 PM    Comment   Gela Bell discharge to home/self care.                   Assessment & Plan       Medical Decision Making  Patient is a 34-year-old female complaining of right-sided facial swelling.  Patient has had dental pain in the right upper molar x 1 month.  Patient was seen in the emergency department prescribed penicillin but did not start that until 1 week ago.    Will obtain CT scan to assess for possible dental/facial abscess formation versus facial cellulitis.  Will administer IV clindamycin and IV dexamethasone and reassess after CT results.    Problems Addressed:  Dental abscess: acute illness or injury  Dental infection: acute illness or injury    Amount and/or Complexity of Data Reviewed  Labs: ordered. Decision-making details documented in ED Course.  Radiology: ordered and independent interpretation performed. Decision-making details documented in ED Course.    Risk  OTC drugs.  Prescription drug management.  Risk Details: Dental referral, antibiotics, chlorhexidine rinse, reviewed return precautions with patient.             Medications   sodium chloride 0.9 % bolus 1,000 mL (0 mL Intravenous Stopped 6/28/25 1637)   clindamycin in dextrose 5% (Cleocin) IVPB (premix) 600 mg 50 mL (0 mg Intravenous Stopped 6/28/25 1607)   dexamethasone (PF) (DECADRON) injection 10 mg (10 mg Intravenous Given 6/28/25 1537)   iohexol (OMNIPAQUE) 350 MG/ML injection (MULTI-DOSE) 85 mL (85 mL Intravenous Given 6/28/25 1553)       ED Risk Strat Scores          I personally discussed return precautions with this  patient and family. I provided the patient with written discharge instructions and particularly highlighted specific areas of interest to this patient, including but not limited to: medications for symptom managment, follow up recommendations, and return precautions. Patient and family are in agreement with this plan as outlined above.          No data recorded        SBIRT 22yo+      Flowsheet Row Most Recent Value   Initial Alcohol Screen: US AUDIT-C     1. How often do you have a drink containing alcohol? 0 Filed at: 06/28/2025 154   2. How many drinks containing alcohol do you have on a typical day you are drinking?  0 Filed at: 06/28/2025 1547   3a. Male UNDER 65: How often do you have five or more drinks on one occasion? 0 Filed at: 06/28/2025 1547   3b. FEMALE Any Age, or MALE 65+: How often do you have 4 or more drinks on one occassion? 0 Filed at: 06/28/2025 154   Audit-C Score 0 Filed at: 06/28/2025 154   SAMANTHA: How many times in the past year have you...    Used an illegal drug or used a prescription medication for non-medical reasons? Never Filed at: 06/28/2025 1541                            History of Present Illness       Chief Complaint   Patient presents with    Dental Pain     Dental pain for a month. Facial swelling right upper face since yesterday. Pt prescribed PCN on 6/7/25. Pt did not start it until the 18th. Pt states she still has 10 pills left. Not taking as prescribed. No fever. Still able to eat and drink. Now swelling of face is making it hard to keep eye open.        Past Medical History[1]   Past Surgical History[2]   Family History[3]   Social History[4]   E-Cigarette/Vaping    E-Cigarette Use Never User       E-Cigarette/Vaping Substances    Nicotine No     THC No     CBD No     Flavoring No     Other No     Unknown No       I have reviewed and agree with the history as documented.     Patient is a 34-year-old female complaining of right-sided facial swelling.  Patient has had  dental pain in the right upper molar x 1 month.  Patient was seen in the emergency department prescribed penicillin but did not start that until 1 week ago.  Patient states she may be having an allergic rash to penicillin as she has had facial swelling.  I discussed with her that is more likely the underlying infection and not allergy to penicillin G but is only facial swelling present in the area of the affected tooth but patient would prefer to stay away from penicillin at this time.  No fevers or chills.  No trismus or difficulty swallowing.  No neck pain or headache.  No zoster rash.  Not immunocompromised in recent past.      Dental Pain  Associated symptoms: facial swelling    Associated symptoms: no congestion, no fever, no headaches and no neck pain        Review of Systems   Constitutional:  Negative for appetite change, chills, fatigue, fever and unexpected weight change.   HENT:  Positive for dental problem and facial swelling. Negative for congestion, ear pain, rhinorrhea and sore throat.    Eyes:  Negative for pain and visual disturbance.   Respiratory:  Negative for cough, chest tightness, shortness of breath and wheezing.    Cardiovascular:  Negative for chest pain, palpitations and leg swelling.   Gastrointestinal:  Negative for abdominal pain, constipation, diarrhea, nausea and vomiting.   Genitourinary:  Negative for difficulty urinating, dysuria, frequency, hematuria, menstrual problem, pelvic pain, vaginal bleeding and vaginal discharge.   Musculoskeletal:  Negative for arthralgias, back pain and neck pain.   Skin:  Negative for color change and rash.   Neurological:  Negative for dizziness, seizures, syncope, light-headedness and headaches.   Psychiatric/Behavioral:  Negative for sleep disturbance.    All other systems reviewed and are negative.          Objective       ED Triage Vitals   Temperature Pulse Blood Pressure Respirations SpO2 Patient Position - Orthostatic VS   06/28/25 1432  06/28/25 1432 06/28/25 1434 06/28/25 1432 06/28/25 1432 06/28/25 1432   97.7 °F (36.5 °C) 72 129/72 18 99 % Sitting      Temp Source Heart Rate Source BP Location FiO2 (%) Pain Score    06/28/25 1432 06/28/25 1432 06/28/25 1432 -- 06/28/25 1432    Temporal Monitor Left arm  7      Vitals      Date and Time Temp Pulse SpO2 Resp BP Pain Score FACES Pain Rating User   06/28/25 1700 -- 59 97 % 16 93/64 7 -- SK   06/28/25 1434 -- 74 99 % -- 129/72 -- -- RJY   06/28/25 1432 97.7 °F (36.5 °C) 72 99 % 18 -- 7 -- RJY            Physical Exam  Vitals and nursing note reviewed.   Constitutional:       General: She is not in acute distress.     Appearance: Normal appearance. She is well-developed and normal weight. She is not ill-appearing, toxic-appearing or diaphoretic.   HENT:      Head: Normocephalic and atraumatic.      Nose: Nose normal.      Mouth/Throat:      Mouth: Mucous membranes are moist.      Pharynx: Oropharynx is clear.        Comments: Overall poor dentition however no missing or avulsed teeth.  There is small gingival erythema surrounding tooth #3 with no evidence for periapical abscess or gingival abscess.  No trismus.  There is right-sided facial swelling in the area of the zygomatic process.  No warmth or fluctuance or crepitus.    Eyes:      General: No scleral icterus.     Extraocular Movements: Extraocular movements intact.      Conjunctiva/sclera: Conjunctivae normal.       Cardiovascular:      Rate and Rhythm: Normal rate and regular rhythm.      Pulses: Normal pulses.      Heart sounds: Normal heart sounds. No murmur heard.     No friction rub. No gallop.   Pulmonary:      Effort: Pulmonary effort is normal. No respiratory distress.      Breath sounds: Normal breath sounds. No wheezing or rales.   Chest:      Chest wall: No tenderness.   Abdominal:      General: Bowel sounds are normal. There is no distension.      Palpations: Abdomen is soft. There is no mass.      Tenderness: There is no abdominal  tenderness. There is no right CVA tenderness, left CVA tenderness, guarding or rebound.      Hernia: No hernia is present.     Musculoskeletal:         General: No tenderness or deformity. Normal range of motion.      Cervical back: Normal range of motion and neck supple. No rigidity or tenderness.      Right lower leg: No edema.      Left lower leg: No edema.   Lymphadenopathy:      Cervical: No cervical adenopathy.     Skin:     General: Skin is warm and dry.      Capillary Refill: Capillary refill takes less than 2 seconds.      Coloration: Skin is not jaundiced or pale.      Findings: No bruising, erythema, lesion or rash.     Neurological:      General: No focal deficit present.      Mental Status: She is alert and oriented to person, place, and time. Mental status is at baseline.     Psychiatric:         Mood and Affect: Mood normal.         Behavior: Behavior normal.         Results Reviewed       Procedure Component Value Units Date/Time    Basic metabolic panel [[id number]] Collected: 06/28/25 1521    Lab Status: Final result Specimen: Blood from Arm, Right Updated: 06/28/25 1541     Sodium 138 mmol/L      Potassium 3.9 mmol/L      Chloride 108 mmol/L      CO2 24 mmol/L      ANION GAP 6 mmol/L      BUN 8 mg/dL      Creatinine 0.73 mg/dL      Glucose 89 mg/dL      Calcium 8.5 mg/dL      eGFR 106 ml/min/1.73sq m     Narrative:      National Kidney Disease Foundation guidelines for Chronic Kidney Disease (CKD):     Stage 1 with normal or high GFR (GFR > 90 mL/min/1.73 square meters)    Stage 2 Mild CKD (GFR = 60-89 mL/min/1.73 square meters)    Stage 3A Moderate CKD (GFR = 45-59 mL/min/1.73 square meters)    Stage 3B Moderate CKD (GFR = 30-44 mL/min/1.73 square meters)    Stage 4 Severe CKD (GFR = 15-29 mL/min/1.73 square meters)    Stage 5 End Stage CKD (GFR <15 mL/min/1.73 square meters)  Note: GFR calculation is accurate only with a steady state creatinine    CBC and differential [[id number]]  (Abnormal)  Collected: 25 1521    Lab Status: Final result Specimen: Blood from Arm, Right Updated: 25 1526     WBC 9.59 Thousand/uL      RBC 4.14 Million/uL      Hemoglobin 13.0 g/dL      Hematocrit 39.4 %      MCV 95 fL      MCH 31.4 pg      MCHC 33.0 g/dL      RDW 12.3 %      MPV 8.8 fL      Platelets 237 Thousands/uL      nRBC 0 /100 WBCs      Segmented % 55 %      Immature Grans % 0 %      Lymphocytes % 24 %      Monocytes % 7 %      Eosinophils Relative 13 %      Basophils Relative 1 %      Absolute Neutrophils 5.26 Thousands/µL      Absolute Immature Grans 0.02 Thousand/uL      Absolute Lymphocytes 2.25 Thousands/µL      Absolute Monocytes 0.66 Thousand/µL      Eosinophils Absolute 1.28 Thousand/µL      Basophils Absolute 0.12 Thousands/µL             CT facial bones with contrast   Final Interpretation by Darci Recio MD ([id number])      1.  1.4 cm gingival abscess along the buccal surface of the right maxilla (series 3, image 36) with surrounding cellulitis, likely odontogenic in origin. Dental assessment advised.   2.  Scattered dental caries.         Workstation performed: [id number]             Procedures    ED Medication and Procedure Management   Prior to Admission Medications   Prescriptions Last Dose Informant Patient Reported? Taking?   QUEtiapine (SEROquel) 50 mg tablet 2025  No Yes   Sig: take 1 tablet by mouth once daily   albuterol (Ventolin HFA) 90 mcg/act inhaler Past Month  No Yes   Sig: Inhale 2 puffs every 6 (six) hours as needed for wheezing or shortness of breath   aspirin-acetaminophen-caffeine (EXCEDRIN MIGRAINE) 250-250-65 MG per tablet More than a month Self Yes No   Sig: Take 1 tablet by mouth every 6 (six) hours as needed for headaches   azelastine (ASTELIN) 0.1 % nasal spray Not Taking Self No No   Si spray into each nostril 2 (two) times a day Use in each nostril as directed   Patient not taking: Reported on 3/13/2025   benzonatate (TESSALON PERLES) 100 mg capsule  Not Taking Self No No   Sig: Take 1 capsule (100 mg total) by mouth 3 (three) times a day as needed for cough   Patient not taking: Reported on 2025   cetirizine (ZyrTEC) 10 mg tablet 2025 Self Yes Yes   Sig: Take 10 mg by mouth daily   chlorhexidine (PERIDEX) 0.12 % solution Not Taking  No No   Sig: Apply 15 mL to the mouth or throat 2 (two) times a day   Patient not taking: Reported on 2025   cholecalciferol (VITAMIN D3) 1,000 units tablet Not Taking Self No No   Sig: take 2 tablets by mouth daily   Patient not taking: Reported on 2025   escitalopram (LEXAPRO) 20 mg tablet 2025 Self No Yes   Sig: take 1 tablet by mouth once daily   fluticasone (VERAMYST) 27.5 MCG/SPRAY nasal spray Not Taking Self No No   Si sprays into each nostril daily   Patient not taking: Reported on 3/13/2025   guaiFENesin (MUCINEX) 600 mg 12 hr tablet Not Taking Self No No   Sig: Take 2 tablets (1,200 mg total) by mouth every 12 (twelve) hours   Patient not taking: Reported on 3/13/2025   loratadine (CLARITIN) 10 mg tablet 2025 Self No Yes   Sig: take 1 tablet by mouth once daily   methylPREDNISolone 4 MG tablet therapy pack Not Taking Self No No   Sig: Use as directed on package   Patient not taking: Reported on 3/13/2025   ondansetron (ZOFRAN) 4 mg tablet Not Taking  No No   Sig: Take 1 tablet (4 mg total) by mouth every 8 (eight) hours as needed for nausea or vomiting   Patient not taking: Reported on 2025   prazosin (MINIPRESS) 1 mg capsule 2025 Self No Yes   Sig: take 1 capsule by mouth at bedtime   propranolol (INDERAL LA) 120 mg 24 hr capsule 2025 Self No Yes   Sig: take 1 capsule by mouth once daily      Facility-Administered Medications: None     Patient's Medications   Discharge Prescriptions    CHLORHEXIDINE (PERIDEX) 0.12 % SOLUTION    Apply 15 mL to the mouth or throat 2 (two) times a day       Start Date: 2025 End Date: --       Order Dose: 15 mL       Quantity: 120 mL     Refills: 0    CLINDAMYCIN (CLEOCIN) 300 MG CAPSULE    Take 1 capsule (300 mg total) by mouth 4 (four) times a day for 7 days       Start Date: 2025 End Date: 2025       Order Dose: 300 mg       Quantity: 28 capsule    Refills: 0     No discharge procedures on file.  ED SEPSIS DOCUMENTATION   Time reflects when diagnosis was documented in both MDM as applicable and the Disposition within this note       Time User Action Codes Description Comment    2025  4:45 PM Alfa Montoya Add [K04.7] Dental abscess     2025  4:45 PM Alfa Montoya Add [K04.7] Dental infection                      [1]   Past Medical History:  Diagnosis Date    Allergic     Anxiety     Depression    [2]   Past Surgical History:  Procedure Laterality Date     SECTION N/A    [3]   Family History  Adopted: Yes   Problem Relation Name Age of Onset    Drug abuse Mother      Drug abuse Father     [4]   Social History  Tobacco Use    Smoking status: Never    Smokeless tobacco: Never   Vaping Use    Vaping status: Never Used   Substance Use Topics    Alcohol use: Not Currently     Comment: drinks very rarely 1 glass per occasion    Drug use: Never        Alfa Motnoya,   25 1755

## 2025-06-28 NOTE — DISCHARGE INSTRUCTIONS
Please follow-up with dental clinic next available appointment.  Please take antibiotics as directed until completed.  Please return if condition worsens.

## 2025-06-30 ENCOUNTER — TELEPHONE (OUTPATIENT)
Dept: FAMILY MEDICINE CLINIC | Facility: CLINIC | Age: 35
End: 2025-06-30

## 2025-06-30 NOTE — TELEPHONE ENCOUNTER
Patient seen in ER for dental pain. Looking to get added to emergency list. I added patient to the emergency list.

## 2025-07-02 ENCOUNTER — OFFICE VISIT (OUTPATIENT)
Dept: DENTISTRY | Facility: CLINIC | Age: 35
End: 2025-07-02
Payer: COMMERCIAL

## 2025-07-02 ENCOUNTER — PATIENT OUTREACH (OUTPATIENT)
Dept: CASE MANAGEMENT | Facility: OTHER | Age: 35
End: 2025-07-02

## 2025-07-02 DIAGNOSIS — K02.9 DENTAL CARIES: Primary | ICD-10-CM

## 2025-07-02 PROCEDURE — D0270 BITEWING - SINGLE RADIOGRAPHIC IMAGE: HCPCS | Performed by: STUDENT IN AN ORGANIZED HEALTH CARE EDUCATION/TRAINING PROGRAM

## 2025-07-02 PROCEDURE — D0140 LIMITED ORAL EVALUATION - PROBLEM FOCUSED: HCPCS | Performed by: STUDENT IN AN ORGANIZED HEALTH CARE EDUCATION/TRAINING PROGRAM

## 2025-07-02 PROCEDURE — D0220 INTRAORAL - PERIAPICAL FIRST RADIOGRAPHIC IMAGE: HCPCS | Performed by: STUDENT IN AN ORGANIZED HEALTH CARE EDUCATION/TRAINING PROGRAM

## 2025-07-02 NOTE — LETTER
[address] [facility]  [place] [address]  [phone]   Re: Transportation Resources   7/2/2025       Dear Gela,    I am a [facility]  and wanted to be certain you had information to contact me should you desire assistance with or have questions about non-medical aspects of your care such as [but not limited to] medical insurance, housing, transportation, material needs, or emergency needs.  If I do not have an answer I will assist you in finding the appropriate agency or individual who can help.      Please feel free to contact me at [phone]. Thank You.    Sincerely,     Natty Marshall

## 2025-07-02 NOTE — PROGRESS NOTES
Referral received from PCP for Outpatient Social Work Care Manager (OP SWCM) to outreach patient and assist with transportation to oral surgeon and assess additional needs.    Per chart review, patient has [employer] insurance and lives in [facility]).  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    Call placed to patient to introduce role of OP SWCM and assess needs.  No answer and unable to leave voicemail as call could not be completed.  Will send letter to patient via G[employer] and await return call.

## 2025-07-02 NOTE — PROGRESS NOTES
Pt presents for limited exam  PMH reviewed, no changes    CC: pain on the upper right. Pain better now after antibiotic started   Extraoral exam: No extraoral swelling  Intraoral Exam: No intraoral swelling.   Pain to perc #4  Pain to palpation #4.    Xrays Taken: PA #4 BW #4     Pt Referred to OS For ext of #4  Pt referred for endo of #4 (pt will ask father for financial help for rct as pt is aware that this is not covered by insurance)    Explained to pt that there is an infection because the infected teeth are still there and to completely stop the infection from recurring, infected teeth need to be extracted. Pt understands that an antibiotic is only a temporary fix to the infection and will work to lessen it and may alleviate some of the pain felt.     Pt understands that tooth is savable but will require RCT and crown to save; both procedures are not covered by insurance.    Pt understands that if tooth is root canal treated, it will need to be crowned very soon afterwards to avoid fracture. Pt also understands that during root canal treatment, tooth may be deemed non-savable due to extensive caries and lack of tooth structure remaining.       Advised patient to establish herself as a patient in a dental practice due to several other areas of caries noted clinically and on radiographs. Patient advised that she will need a full comprehensive exam as today was just a limited exam.

## 2025-07-06 ENCOUNTER — APPOINTMENT (EMERGENCY)
Dept: CT IMAGING | Facility: HOSPITAL | Age: 35
End: 2025-07-06
Payer: COMMERCIAL

## 2025-07-06 ENCOUNTER — HOSPITAL ENCOUNTER (EMERGENCY)
Facility: HOSPITAL | Age: 35
Discharge: HOME | End: 2025-07-06
Attending: EMERGENCY MEDICINE | Admitting: EMERGENCY MEDICINE
Payer: COMMERCIAL

## 2025-07-06 VITALS
DIASTOLIC BLOOD PRESSURE: 78 MMHG | SYSTOLIC BLOOD PRESSURE: 102 MMHG | RESPIRATION RATE: 20 BRPM | OXYGEN SATURATION: 96 % | TEMPERATURE: 98.4 F | HEART RATE: 69 BPM

## 2025-07-06 DIAGNOSIS — R11.0 NAUSEA: Primary | ICD-10-CM

## 2025-07-06 DIAGNOSIS — R10.9 ABDOMINAL PAIN: ICD-10-CM

## 2025-07-06 LAB
ALBUMIN SERPL BCG-MCNC: 4.2 G/DL (ref 3.5–5)
ALP SERPL-CCNC: 62 U/L (ref 34–104)
ALT SERPL W P-5'-P-CCNC: 22 U/L (ref 7–52)
ANION GAP SERPL CALCULATED.3IONS-SCNC: 8 MMOL/L (ref 4–13)
AST SERPL W P-5'-P-CCNC: 28 U/L (ref 13–39)
BACTERIA UR QL AUTO: ABNORMAL /HPF
BASOPHILS # BLD AUTO: 0.14 THOUSANDS/ÂΜL (ref 0–0.1)
BASOPHILS NFR BLD AUTO: 1 % (ref 0–1)
BILIRUB SERPL-MCNC: 0.35 MG/DL (ref 0.2–1)
BILIRUB UR QL STRIP: NEGATIVE
BUN SERPL-MCNC: 4 MG/DL (ref 5–25)
CALCIUM SERPL-MCNC: 8.8 MG/DL (ref 8.4–10.2)
CHLORIDE SERPL-SCNC: 109 MMOL/L (ref 96–108)
CLARITY UR: CLEAR
CO2 SERPL-SCNC: 22 MMOL/L (ref 21–32)
COLOR UR: YELLOW
CREAT SERPL-MCNC: 0.72 MG/DL (ref 0.6–1.3)
EOSINOPHIL # BLD AUTO: 1.43 THOUSAND/ÂΜL (ref 0–0.61)
EOSINOPHIL NFR BLD AUTO: 12 % (ref 0–6)
ERYTHROCYTE [DISTWIDTH] IN BLOOD BY AUTOMATED COUNT: 12.5 % (ref 11.6–15.1)
EXT PREGNANCY TEST URINE: NEGATIVE
EXT. CONTROL: NORMAL
GFR SERPL CREATININE-BSD FRML MDRD: 108 ML/MIN/1.73SQ M
GLUCOSE SERPL-MCNC: 96 MG/DL (ref 65–140)
GLUCOSE UR STRIP-MCNC: NEGATIVE MG/DL
HCT VFR BLD AUTO: 41 % (ref 34.8–46.1)
HGB BLD-MCNC: 13.9 G/DL (ref 11.5–15.4)
HGB UR QL STRIP.AUTO: ABNORMAL
IMM GRANULOCYTES # BLD AUTO: 0.04 THOUSAND/UL (ref 0–0.2)
IMM GRANULOCYTES NFR BLD AUTO: 0 % (ref 0–2)
KETONES UR STRIP-MCNC: ABNORMAL MG/DL
LEUKOCYTE ESTERASE UR QL STRIP: NEGATIVE
LIPASE SERPL-CCNC: 25 U/L (ref 11–82)
LYMPHOCYTES # BLD AUTO: 2.75 THOUSANDS/ÂΜL (ref 0.6–4.47)
LYMPHOCYTES NFR BLD AUTO: 23 % (ref 14–44)
MCH RBC QN AUTO: 31.2 PG (ref 26.8–34.3)
MCHC RBC AUTO-ENTMCNC: 33.9 G/DL (ref 31.4–37.4)
MCV RBC AUTO: 92 FL (ref 82–98)
MONOCYTES # BLD AUTO: 0.93 THOUSAND/ÂΜL (ref 0.17–1.22)
MONOCYTES NFR BLD AUTO: 8 % (ref 4–12)
MUCOUS THREADS UR QL AUTO: ABNORMAL
NEUTROPHILS # BLD AUTO: 6.72 THOUSANDS/ÂΜL (ref 1.85–7.62)
NEUTS SEG NFR BLD AUTO: 56 % (ref 43–75)
NITRITE UR QL STRIP: NEGATIVE
NON-SQ EPI CELLS URNS QL MICRO: ABNORMAL /HPF
NRBC BLD AUTO-RTO: 0 /100 WBCS
PH UR STRIP.AUTO: 6 [PH]
PLATELET # BLD AUTO: 311 THOUSANDS/UL (ref 149–390)
PMV BLD AUTO: 9.6 FL (ref 8.9–12.7)
POTASSIUM SERPL-SCNC: 4.4 MMOL/L (ref 3.5–5.3)
PROT SERPL-MCNC: 6.8 G/DL (ref 6.4–8.4)
PROT UR STRIP-MCNC: NEGATIVE MG/DL
RBC # BLD AUTO: 4.45 MILLION/UL (ref 3.81–5.12)
RBC #/AREA URNS AUTO: ABNORMAL /HPF
SODIUM SERPL-SCNC: 139 MMOL/L (ref 135–147)
SP GR UR STRIP.AUTO: 1.02 (ref 1–1.03)
UROBILINOGEN UR STRIP-ACNC: <2 MG/DL
WBC # BLD AUTO: 12.01 THOUSAND/UL (ref 4.31–10.16)
WBC #/AREA URNS AUTO: ABNORMAL /HPF

## 2025-07-06 PROCEDURE — 74177 CT ABD & PELVIS W/CONTRAST: CPT

## 2025-07-06 PROCEDURE — 96361 HYDRATE IV INFUSION ADD-ON: CPT

## 2025-07-06 PROCEDURE — 81001 URINALYSIS AUTO W/SCOPE: CPT | Performed by: PHYSICIAN ASSISTANT

## 2025-07-06 PROCEDURE — 83690 ASSAY OF LIPASE: CPT | Performed by: PHYSICIAN ASSISTANT

## 2025-07-06 PROCEDURE — 99285 EMERGENCY DEPT VISIT HI MDM: CPT | Performed by: PHYSICIAN ASSISTANT

## 2025-07-06 PROCEDURE — 81025 URINE PREGNANCY TEST: CPT | Performed by: PHYSICIAN ASSISTANT

## 2025-07-06 PROCEDURE — 36415 COLL VENOUS BLD VENIPUNCTURE: CPT | Performed by: PHYSICIAN ASSISTANT

## 2025-07-06 PROCEDURE — 85025 COMPLETE CBC W/AUTO DIFF WBC: CPT | Performed by: PHYSICIAN ASSISTANT

## 2025-07-06 PROCEDURE — 80053 COMPREHEN METABOLIC PANEL: CPT | Performed by: PHYSICIAN ASSISTANT

## 2025-07-06 PROCEDURE — 96374 THER/PROPH/DIAG INJ IV PUSH: CPT

## 2025-07-06 PROCEDURE — 99284 EMERGENCY DEPT VISIT MOD MDM: CPT

## 2025-07-06 RX ORDER — METOCLOPRAMIDE 10 MG/1
10 TABLET ORAL EVERY 6 HOURS
Qty: 5 TABLET | Refills: 0 | Status: SHIPPED | OUTPATIENT
Start: 2025-07-06

## 2025-07-06 RX ORDER — METOCLOPRAMIDE HYDROCHLORIDE 5 MG/ML
10 INJECTION INTRAMUSCULAR; INTRAVENOUS ONCE
Status: COMPLETED | OUTPATIENT
Start: 2025-07-06 | End: 2025-07-06

## 2025-07-06 RX ADMIN — METOCLOPRAMIDE 10 MG: 5 INJECTION, SOLUTION INTRAMUSCULAR; INTRAVENOUS at 16:26

## 2025-07-06 RX ADMIN — SODIUM CHLORIDE 1000 ML: 0.9 INJECTION, SOLUTION INTRAVENOUS at 16:25

## 2025-07-06 RX ADMIN — IOHEXOL 100 ML: 350 INJECTION, SOLUTION INTRAVENOUS at 17:14

## 2025-07-06 NOTE — ED PROVIDER NOTES
Time reflects when diagnosis was documented in both MDM as applicable and the Disposition within this note       Time User Action Codes Description Comment    7/6/2025  6:57 PM Misael Plascencia Add [R11.0] Nausea     7/6/2025  6:57 PM Misael Plascencia Add [R10.9] Abdominal pain           ED Disposition       ED Disposition   Discharge    Condition   Stable    Date/Time   Sun Jul 6, 2025  6:57 PM    Comment   Gela Bell discharge to home/self care.                   Assessment & Plan       Medical Decision Making  34-year-old female presents for evaluation of nausea after utilizing antibiotics.  See HPI for further details the differential diagnosis includes electrolyte disturbance, acute kidney injury, sepsis, severely doubt acute abdomen such as cholecystitis or appendicitis based upon presentation however considered.    Workup benign, stable for dc home    Amount and/or Complexity of Data Reviewed  Labs: ordered.  Radiology: ordered.    Risk  Prescription drug management.        ED Course as of 07/06/25 1907   Sun Jul 06, 2025   1845 Awaiting ct interp   1857 IMPRESSION:     1. No acute findings in the abdomen/pelvis.            Medications   sodium chloride 0.9 % bolus 1,000 mL (0 mL Intravenous Stopped 7/6/25 1659)   metoclopramide (REGLAN) injection 10 mg (10 mg Intravenous Given 7/6/25 1626)   iohexol (OMNIPAQUE) 350 MG/ML injection (MULTI-DOSE) 100 mL (100 mL Intravenous Given 7/6/25 1714)       ED Risk Strat Scores                    No data recorded        SBIRT 20yo+      Flowsheet Row Most Recent Value   Initial Alcohol Screen: US AUDIT-C     1. How often do you have a drink containing alcohol? 0 Filed at: 07/06/2025 1607   2. How many drinks containing alcohol do you have on a typical day you are drinking?  0 Filed at: 07/06/2025 1607   3a. Male UNDER 65: How often do you have five or more drinks on one occasion? 0 Filed at: 07/06/2025 1607   3b. FEMALE Any Age, or MALE 65+: How often do you have 4  or more drinks on one occassion? 0 Filed at: 07/06/2025 1600   Audit-C Score 0 Filed at: 07/06/2025 1606   SAMANTHA: How many times in the past year have you...    Used an illegal drug or used a prescription medication for non-medical reasons? Never Filed at: 07/06/2025 1600                            History of Present Illness       Chief Complaint   Patient presents with    Abdominal Pain     Patient brought by EMS for epigastric pain. States its from being on abx from tooth infection, unable to tolerate food for a week        Past Medical History[1]   Past Surgical History[2]   Family History[3]   Social History[4]   E-Cigarette/Vaping    E-Cigarette Use Never User       E-Cigarette/Vaping Substances    Nicotine No     THC No     CBD No     Flavoring No     Other No     Unknown No       I have reviewed and agree with the history as documented.     This is a 34-year-old female who presents to the emergency department today via EMS for evaluation of nausea and decreased today.  She states that she is finishing clindamycin for a tooth infection.  She has only been able to tolerate some soups over the last few days.  She actually denies vomiting.  She has had some watery brown stool.  No significant localized abdominal pain.  No fever.  Well-appearing at bedside vital signs reviewed within reasonable limits      Abdominal Pain  Associated symptoms: no chest pain, no chills, no cough, no fever, no shortness of breath, no sore throat and no vomiting        Review of Systems   Constitutional: Negative.  Negative for chills and fever.   HENT: Negative.  Negative for sore throat and trouble swallowing.    Eyes: Negative.    Respiratory: Negative.  Negative for cough, shortness of breath and wheezing.    Cardiovascular: Negative.  Negative for chest pain and leg swelling.   Gastrointestinal:  Positive for abdominal pain. Negative for blood in stool and vomiting.   Endocrine: Negative.    Genitourinary: Negative.     Musculoskeletal: Negative.  Negative for neck stiffness.   Skin: Negative.    Allergic/Immunologic: Negative.    Neurological: Negative.  Negative for dizziness, seizures, speech difficulty, weakness, light-headedness, numbness and headaches.   Hematological: Negative.    Psychiatric/Behavioral: Negative.     All other systems reviewed and are negative.          Objective       ED Triage Vitals [07/06/25 1609]   Temperature Pulse Blood Pressure Respirations SpO2 Patient Position - Orthostatic VS   98.4 °F (36.9 °C) 61 102/55 18 95 % Lying      Temp Source Heart Rate Source BP Location FiO2 (%) Pain Score    Oral Monitor Right arm -- 4      Vitals      Date and Time Temp Pulse SpO2 Resp BP Pain Score FACES Pain Rating User   07/06/25 1830 -- 75 96 % -- 94/68 No Pain --    07/06/25 1730 98.4 °F (36.9 °C) 78 96 % 18 104/63 No Pain --    07/06/25 1715 -- 103 96 % -- 117/64 No Pain --    07/06/25 1609 98.4 °F (36.9 °C) 61 95 % 18 102/55 4 -- CLS            Physical Exam  Constitutional:       Appearance: She is well-developed. She is not ill-appearing.   HENT:      Right Ear: External ear normal. No swelling. Tympanic membrane is not bulging.      Left Ear: External ear normal. No swelling. Tympanic membrane is not bulging.      Nose: Nose normal.      Mouth/Throat:      Pharynx: No oropharyngeal exudate.     Eyes:      General: Lids are normal.      Conjunctiva/sclera: Conjunctivae normal.      Pupils: Pupils are equal, round, and reactive to light.     Neck:      Thyroid: No thyromegaly.      Vascular: No JVD.      Trachea: No tracheal deviation.     Cardiovascular:      Rate and Rhythm: Normal rate and regular rhythm.      Pulses: Normal pulses.      Heart sounds: Normal heart sounds. No murmur heard.     No friction rub. No gallop.   Pulmonary:      Effort: Pulmonary effort is normal. No respiratory distress.      Breath sounds: Normal breath sounds. No stridor. No wheezing or rales.   Chest:      Chest wall:  No tenderness.   Abdominal:      General: Bowel sounds are normal. There is no distension.      Palpations: Abdomen is soft. There is no mass.      Tenderness: There is no abdominal tenderness. There is no guarding or rebound.      Hernia: No hernia is present.     Musculoskeletal:         General: Normal range of motion.      Cervical back: Normal range of motion and neck supple. No edema. Normal range of motion.   Lymphadenopathy:      Cervical: No cervical adenopathy.     Skin:     General: Skin is warm and dry.      Coloration: Skin is not pale.      Findings: No erythema or rash.     Neurological:      Mental Status: She is alert and oriented to person, place, and time.      GCS: GCS eye subscore is 4. GCS verbal subscore is 5. GCS motor subscore is 6.      Cranial Nerves: No cranial nerve deficit.      Sensory: No sensory deficit.      Deep Tendon Reflexes: Reflexes are normal and symmetric.     Psychiatric:         Speech: Speech normal.         Behavior: Behavior normal.         Results Reviewed       Procedure Component Value Units Date/Time    Urine Microscopic [[id number]]  (Abnormal) Collected: 07/06/25 1704    Lab Status: Final result Specimen: Urine, Clean Catch Updated: 07/06/25 1723     RBC, UA 1-2 /hpf      WBC, UA 0-1 /hpf      Epithelial Cells Moderate /hpf      Bacteria, UA Occasional /hpf      MUCUS THREADS Moderate    UA (URINE) with reflex to Scope [[id number]]  (Abnormal) Collected: 07/06/25 1704    Lab Status: Final result Specimen: Urine, Clean Catch Updated: 07/06/25 1715     Color, UA Yellow     Clarity, UA Clear     Specific Gravity, UA 1.020     pH, UA 6.0     Leukocytes, UA Negative     Nitrite, UA Negative     Protein, UA Negative mg/dl      Glucose, UA Negative mg/dl      Ketones, UA Trace mg/dl      Urobilinogen, UA <2.0 mg/dl      Bilirubin, UA Negative     Occult Blood, UA Trace    POCT pregnancy, urine [[id number]]  (Normal) Collected: 07/06/25 1707    Lab Status: Final result  Updated: 07/06/25 1708     EXT Preg Test, Ur Negative     Control Valid    Comprehensive metabolic panel [[id number]]  (Abnormal) Collected: 07/06/25 1621    Lab Status: Final result Specimen: Blood from Arm, Left Updated: 07/06/25 1645     Sodium 139 mmol/L      Potassium 4.4 mmol/L      Chloride 109 mmol/L      CO2 22 mmol/L      ANION GAP 8 mmol/L      BUN 4 mg/dL      Creatinine 0.72 mg/dL      Glucose 96 mg/dL      Calcium 8.8 mg/dL      AST 28 U/L      ALT 22 U/L      Alkaline Phosphatase 62 U/L      Total Protein 6.8 g/dL      Albumin 4.2 g/dL      Total Bilirubin 0.35 mg/dL      eGFR 108 ml/min/1.73sq m     Narrative:      National Kidney Disease Foundation guidelines for Chronic Kidney Disease (CKD):     Stage 1 with normal or high GFR (GFR > 90 mL/min/1.73 square meters)    Stage 2 Mild CKD (GFR = 60-89 mL/min/1.73 square meters)    Stage 3A Moderate CKD (GFR = 45-59 mL/min/1.73 square meters)    Stage 3B Moderate CKD (GFR = 30-44 mL/min/1.73 square meters)    Stage 4 Severe CKD (GFR = 15-29 mL/min/1.73 square meters)    Stage 5 End Stage CKD (GFR <15 mL/min/1.73 square meters)  Note: GFR calculation is accurate only with a steady state creatinine    Lipase [[id number]]  (Normal) Collected: 07/06/25 1621    Lab Status: Final result Specimen: Blood from Arm, Left Updated: 07/06/25 1645     Lipase 25 u/L     CBC and differential [[id number]]  (Abnormal) Collected: 07/06/25 1621    Lab Status: Final result Specimen: Blood from Arm, Left Updated: 07/06/25 1629     WBC 12.01 Thousand/uL      RBC 4.45 Million/uL      Hemoglobin 13.9 g/dL      Hematocrit 41.0 %      MCV 92 fL      MCH 31.2 pg      MCHC 33.9 g/dL      RDW 12.5 %      MPV 9.6 fL      Platelets 311 Thousands/uL      nRBC 0 /100 WBCs      Segmented % 56 %      Immature Grans % 0 %      Lymphocytes % 23 %      Monocytes % 8 %      Eosinophils Relative 12 %      Basophils Relative 1 %      Absolute Neutrophils 6.72 Thousands/µL      Absolute Immature  Grans 0.04 Thousand/uL      Absolute Lymphocytes 2.75 Thousands/µL      Absolute Monocytes 0.93 Thousand/µL      Eosinophils Absolute 1.43 Thousand/µL      Basophils Absolute 0.14 Thousands/µL             CT abdomen pelvis with contrast   Final Interpretation by Paul Murrieta MD ([id number])      1. No acute findings in the abdomen/pelvis.         Workstation performed: [id number]             Procedures    ED Medication and Procedure Management   Prior to Admission Medications   Prescriptions Last Dose Informant Patient Reported? Taking?   QUEtiapine (SEROquel) 50 mg tablet 2025  No Yes   Sig: take 1 tablet by mouth once daily   albuterol (Ventolin HFA) 90 mcg/act inhaler   No No   Sig: Inhale 2 puffs every 6 (six) hours as needed for wheezing or shortness of breath   aspirin-acetaminophen-caffeine (EXCEDRIN MIGRAINE) 250-250-65 MG per tablet  Self Yes No   Sig: Take 1 tablet by mouth every 6 (six) hours as needed for headaches   azelastine (ASTELIN) 0.1 % nasal spray Not Taking Self No No   Si spray into each nostril 2 (two) times a day Use in each nostril as directed   Patient not taking: Reported on 3/13/2025   benzonatate (TESSALON PERLES) 100 mg capsule Not Taking Self No No   Sig: Take 1 capsule (100 mg total) by mouth 3 (three) times a day as needed for cough   Patient not taking: Reported on 2025   cetirizine (ZyrTEC) 10 mg tablet Not Taking Self Yes No   Sig: Take 10 mg by mouth daily   Patient not taking: Reported on 2025   chlorhexidine (PERIDEX) 0.12 % solution Not Taking  No No   Sig: Apply 15 mL to the mouth or throat 2 (two) times a day   Patient not taking: Reported on 2025   chlorhexidine (PERIDEX) 0.12 % solution Not Taking  No No   Sig: Apply 15 mL to the mouth or throat 2 (two) times a day   Patient not taking: Reported on 2025   cholecalciferol (VITAMIN D3) 1,000 units tablet Not Taking Self No No   Sig: take 2 tablets by mouth daily   Patient not taking: Reported on  2025   clindamycin (CLEOCIN) 300 MG capsule 2025  No Yes   Sig: Take 1 capsule (300 mg total) by mouth 4 (four) times a day for 7 days   escitalopram (LEXAPRO) 20 mg tablet 2025 Self No Yes   Sig: take 1 tablet by mouth once daily   fluticasone (VERAMYST) 27.5 MCG/SPRAY nasal spray Not Taking Self No No   Si sprays into each nostril daily   Patient not taking: Reported on 3/13/2025   guaiFENesin (MUCINEX) 600 mg 12 hr tablet Not Taking Self No No   Sig: Take 2 tablets (1,200 mg total) by mouth every 12 (twelve) hours   Patient not taking: Reported on 3/13/2025   loratadine (CLARITIN) 10 mg tablet 2025 Self No Yes   Sig: take 1 tablet by mouth once daily   methylPREDNISolone 4 MG tablet therapy pack Not Taking Self No No   Sig: Use as directed on package   Patient not taking: Reported on 3/13/2025   ondansetron (ZOFRAN) 4 mg tablet Not Taking  No No   Sig: Take 1 tablet (4 mg total) by mouth every 8 (eight) hours as needed for nausea or vomiting   Patient not taking: Reported on 2025   prazosin (MINIPRESS) 1 mg capsule 2025 Self No Yes   Sig: take 1 capsule by mouth at bedtime   propranolol (INDERAL LA) 120 mg 24 hr capsule 2025 Self No Yes   Sig: take 1 capsule by mouth once daily      Facility-Administered Medications: None     Patient's Medications   Discharge Prescriptions    METOCLOPRAMIDE (REGLAN) 10 MG TABLET    Take 1 tablet (10 mg total) by mouth every 6 (six) hours       Start Date: 2025  End Date: --       Order Dose: 10 mg       Quantity: 5 tablet    Refills: 0     No discharge procedures on file.  ED SEPSIS DOCUMENTATION   Time reflects when diagnosis was documented in both MDM as applicable and the Disposition within this note       Time User Action Codes Description Comment    2025  6:57 PM Misael Plascencia Add [R11.0] Nausea     2025  6:57 PM Misael Plascencia Add [R10.9] Abdominal pain                    [1]   Past Medical History:  Diagnosis Date     Allergic     Anxiety     Depression    [2]   Past Surgical History:  Procedure Laterality Date     SECTION N/A    [3]   Family History  Adopted: Yes   Problem Relation Name Age of Onset    Drug abuse Mother      Drug abuse Father     [4]   Social History  Tobacco Use    Smoking status: Never    Smokeless tobacco: Never   Vaping Use    Vaping status: Never Used   Substance Use Topics    Alcohol use: Not Currently     Comment: drinks very rarely 1 glass per occasion    Drug use: Never        Misael Plascencia PA-C  [phone]

## 2025-07-08 ENCOUNTER — PATIENT OUTREACH (OUTPATIENT)
Dept: CASE MANAGEMENT | Facility: OTHER | Age: 35
End: 2025-07-08

## 2025-07-08 NOTE — PROGRESS NOTES
2nd outreach attempt to patient to assist with transportation to oral surgeon and assess additional needs.     Patient has [employer] insurance and lives in [place] ([facility]).  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    Briefly spoke with patient who shared she has oral surgery appt today at 3PM.  States family helped her pay for [employer] ride to appt.    Reminded patient that OPCM team assisted her in the past with [facility] [employer] [facility] and was approved for this service.  Patient did not recall this.  Encouraged patient to call [facility] (ensured she had phone number) to ask if account was still active or if new application would need to be completed for [facility] services.    Patient will call today to inquire about this.  Agreed for OP SWCM to outreach again tomorrow to follow-up on this.  Will assess additional needs at this time as well.

## 2025-07-09 ENCOUNTER — PATIENT OUTREACH (OUTPATIENT)
Dept: CASE MANAGEMENT | Facility: OTHER | Age: 35
End: 2025-07-09

## 2025-07-09 NOTE — PROGRESS NOTES
Follow-up call placed to patient to check status of [facility] services.    No answer, voicemail left, and awaiting return call.

## 2025-07-16 ENCOUNTER — PATIENT OUTREACH (OUTPATIENT)
Dept: CASE MANAGEMENT | Facility: OTHER | Age: 35
End: 2025-07-16

## 2025-07-18 ENCOUNTER — PATIENT OUTREACH (OUTPATIENT)
Dept: CASE MANAGEMENT | Facility: OTHER | Age: 35
End: 2025-07-18

## 2025-07-18 NOTE — PROGRESS NOTES
Return call received from Adeline at [facility].  Adeline confirmed patient is set up with [facility] services and they received required letter from [facility] in [place] confirming that patient is established at their office.    Advised Adeline that Oral Surgery Appt is scheduled for 8/11 (time unknown by OP SWCM).  Adeline stated that OOC transport schedule is unknown for 8/11 at this time.  Adeline requested that patient call on July 30th or 31st to request OOC transport for 8/11.  Adeline also advised that appt start time cannot be after 2PM and patient must be done by 3PM in order to have transportation back to [facility].    Call placed to patient to discuss above.  No answer, detailed voicemail left, and awaiting return call.  Provided phone number for [facility] and encouraged her to call to schedule OOC transport on 7/30 or 7/31.  Will follow.

## 2025-07-18 NOTE — PROGRESS NOTES
Message received from Adeline at [facility] [phone] in response to [facility]'s recent outreach attempt to discuss process of requesting out of county transportation needs.    Return call placed.  No answer, voicemail left, and awaiting return call.

## 2025-07-23 ENCOUNTER — PATIENT OUTREACH (OUTPATIENT)
Dept: CASE MANAGEMENT | Facility: OTHER | Age: 35
End: 2025-07-23

## 2025-07-23 NOTE — PROGRESS NOTES
Additional call placed to patient to discuss updates received from [facility] Rep Adeline.  Provided updates to patient who understands need to call on 7/30 or 7/31 to request out of county transportation for 8/11 appt.  Patient confirmed time for appt is 11AM.    Informed patient to be mindful of time for future appts (cannot be scheduled after 2PM and appt would need to end by 3PM) per [facility]'s policy.  Patient understanding.    Patient denied any additional needs at this time.  Will close case but will remain available to assist with any future needs.

## 2025-07-24 ENCOUNTER — TELEPHONE (OUTPATIENT)
Dept: FAMILY MEDICINE CLINIC | Facility: CLINIC | Age: 35
End: 2025-07-24

## 2025-08-20 ENCOUNTER — OFFICE VISIT (OUTPATIENT)
Dept: GASTROENTEROLOGY | Facility: CLINIC | Age: 35
End: 2025-08-20

## 2025-08-20 VITALS
SYSTOLIC BLOOD PRESSURE: 110 MMHG | TEMPERATURE: 97.6 F | OXYGEN SATURATION: 98 % | WEIGHT: 195 LBS | BODY MASS INDEX: 38.28 KG/M2 | HEART RATE: 70 BPM | DIASTOLIC BLOOD PRESSURE: 73 MMHG | HEIGHT: 60 IN

## 2025-08-20 DIAGNOSIS — R19.4 CHANGE IN BOWEL HABITS: ICD-10-CM

## 2025-08-20 DIAGNOSIS — R13.10 DYSPHAGIA, UNSPECIFIED TYPE: ICD-10-CM

## 2025-08-20 DIAGNOSIS — R11.0 NAUSEA: Primary | ICD-10-CM

## 2025-08-20 RX ORDER — SODIUM CHLORIDE, SODIUM LACTATE, POTASSIUM CHLORIDE, CALCIUM CHLORIDE 600; 310; 30; 20 MG/100ML; MG/100ML; MG/100ML; MG/100ML
125 INJECTION, SOLUTION INTRAVENOUS CONTINUOUS
OUTPATIENT
Start: 2025-08-20

## 2025-08-20 RX ORDER — FAMOTIDINE 40 MG/1
40 TABLET, FILM COATED ORAL DAILY
Qty: 30 TABLET | Refills: 2 | Status: SHIPPED | OUTPATIENT
Start: 2025-08-20 | End: 2025-08-26 | Stop reason: SDUPTHER

## 2025-08-25 PROBLEM — F39 MOOD DISORDER (HCC): Status: RESOLVED | Noted: 2023-10-11 | Resolved: 2025-08-25

## 2025-08-25 PROBLEM — F41.9 ANXIETY: Status: RESOLVED | Noted: 2023-10-11 | Resolved: 2025-08-25
